# Patient Record
Sex: FEMALE | Race: WHITE | NOT HISPANIC OR LATINO | Employment: OTHER | URBAN - NONMETROPOLITAN AREA
[De-identification: names, ages, dates, MRNs, and addresses within clinical notes are randomized per-mention and may not be internally consistent; named-entity substitution may affect disease eponyms.]

---

## 2023-03-15 DIAGNOSIS — E11.69 DM TYPE 2 WITH DIABETIC MIXED HYPERLIPIDEMIA (MULTI): ICD-10-CM

## 2023-03-15 DIAGNOSIS — E78.2 DM TYPE 2 WITH DIABETIC MIXED HYPERLIPIDEMIA (MULTI): ICD-10-CM

## 2023-03-15 DIAGNOSIS — I10 HTN (HYPERTENSION), BENIGN: Primary | ICD-10-CM

## 2023-03-15 RX ORDER — ATORVASTATIN CALCIUM 40 MG/1
40 TABLET, FILM COATED ORAL DAILY
COMMUNITY
Start: 2018-01-14 | End: 2023-12-18 | Stop reason: SDUPTHER

## 2023-03-15 RX ORDER — ACETAMINOPHEN 500 MG
1 TABLET ORAL DAILY
COMMUNITY

## 2023-03-15 RX ORDER — LANCETS 30 GAUGE
EACH MISCELLANEOUS
COMMUNITY
Start: 2023-02-16 | End: 2024-04-02 | Stop reason: SDUPTHER

## 2023-03-15 RX ORDER — METFORMIN HYDROCHLORIDE 500 MG/1
750 TABLET ORAL 2 TIMES DAILY
COMMUNITY
Start: 2018-03-16 | End: 2023-05-26 | Stop reason: SDUPTHER

## 2023-03-15 RX ORDER — EPINEPHRINE 0.22MG
100 AEROSOL WITH ADAPTER (ML) INHALATION DAILY
COMMUNITY

## 2023-03-15 RX ORDER — PIOGLITAZONE 15 MG/1
15 TABLET ORAL DAILY
Qty: 90 TABLET | Refills: 0 | Status: SHIPPED | OUTPATIENT
Start: 2023-03-15 | End: 2023-03-17 | Stop reason: SDUPTHER

## 2023-03-15 RX ORDER — BLOOD SUGAR DIAGNOSTIC
STRIP MISCELLANEOUS
COMMUNITY
Start: 2020-12-03 | End: 2024-04-08 | Stop reason: SDUPTHER

## 2023-03-15 RX ORDER — ATENOLOL AND CHLORTHALIDONE TABLET 100; 25 MG/1; MG/1
1 TABLET ORAL DAILY
COMMUNITY
Start: 2022-11-17 | End: 2023-05-05 | Stop reason: SDUPTHER

## 2023-03-15 RX ORDER — LANCETS 33 GAUGE
EACH MISCELLANEOUS
COMMUNITY
Start: 2023-02-16

## 2023-03-15 RX ORDER — PIOGLITAZONE 15 MG/1
15 TABLET ORAL DAILY
COMMUNITY
Start: 2006-03-30 | End: 2023-03-15 | Stop reason: SDUPTHER

## 2023-03-17 DIAGNOSIS — E78.2 DM TYPE 2 WITH DIABETIC MIXED HYPERLIPIDEMIA (MULTI): ICD-10-CM

## 2023-03-17 DIAGNOSIS — E11.69 DM TYPE 2 WITH DIABETIC MIXED HYPERLIPIDEMIA (MULTI): ICD-10-CM

## 2023-03-17 RX ORDER — PIOGLITAZONEHYDROCHLORIDE 15 MG/1
15 TABLET ORAL DAILY
Qty: 90 TABLET | Refills: 0 | Status: SHIPPED | OUTPATIENT
Start: 2023-03-17 | End: 2023-12-18 | Stop reason: SDUPTHER

## 2023-03-31 DIAGNOSIS — U07.1 COVID-19: Primary | ICD-10-CM

## 2023-04-11 PROBLEM — M47.812 NECK ARTHRITIS: Status: ACTIVE | Noted: 2023-04-11

## 2023-04-11 PROBLEM — M17.0 OSTEOARTHRITIS OF BOTH KNEES: Status: ACTIVE | Noted: 2023-04-11

## 2023-04-11 PROBLEM — N12 PYELONEPHRITIS OF LEFT KIDNEY: Status: RESOLVED | Noted: 2023-04-11 | Resolved: 2023-04-11

## 2023-04-12 ENCOUNTER — OFFICE VISIT (OUTPATIENT)
Dept: PRIMARY CARE | Facility: CLINIC | Age: 78
End: 2023-04-12
Payer: COMMERCIAL

## 2023-04-12 VITALS
OXYGEN SATURATION: 96 % | BODY MASS INDEX: 20.04 KG/M2 | WEIGHT: 120.4 LBS | TEMPERATURE: 96.5 F | RESPIRATION RATE: 10 BRPM | DIASTOLIC BLOOD PRESSURE: 60 MMHG | HEART RATE: 69 BPM | SYSTOLIC BLOOD PRESSURE: 121 MMHG

## 2023-04-12 DIAGNOSIS — R41.3 MEMORY DEFICIT: Primary | ICD-10-CM

## 2023-04-12 PROCEDURE — 99214 OFFICE O/P EST MOD 30 MIN: CPT | Performed by: FAMILY MEDICINE

## 2023-04-12 PROCEDURE — 1159F MED LIST DOCD IN RCRD: CPT | Performed by: FAMILY MEDICINE

## 2023-04-12 PROCEDURE — 1036F TOBACCO NON-USER: CPT | Performed by: FAMILY MEDICINE

## 2023-04-12 PROCEDURE — 3078F DIAST BP <80 MM HG: CPT | Performed by: FAMILY MEDICINE

## 2023-04-12 PROCEDURE — 3074F SYST BP LT 130 MM HG: CPT | Performed by: FAMILY MEDICINE

## 2023-04-12 RX ORDER — DONEPEZIL HYDROCHLORIDE 5 MG/1
5 TABLET, FILM COATED ORAL DAILY
Qty: 30 TABLET | Refills: 1 | Status: SHIPPED | OUTPATIENT
Start: 2023-04-12 | End: 2023-04-13 | Stop reason: SDUPTHER

## 2023-04-12 ASSESSMENT — ENCOUNTER SYMPTOMS
OCCASIONAL FEELINGS OF UNSTEADINESS: 0
DEPRESSION: 0
LOSS OF SENSATION IN FEET: 0

## 2023-04-12 NOTE — PROGRESS NOTES
"Subjective   Inge Clark is a 78 y.o. female who presents for Dementia (Pts daughter wants her to be evaluated for dementia. ).  HPI    Pt her with spouse, Deacon Clark,  son,  and dtr .     Spouse called the office (phone call documentation in [employer] ) requesting referral for himself and spouse , for a memory evaluation. They live very far away  and so I was unsure who to refer to out in their area.  I sent a msg to our Pt [facility], who helped them.   They got scheduled and paperwork was to be filled out.  Upon seeing the paperwork, pt refused the appointment, refused to fill out the appointment.     Pt's dtr requested this appointment to see what else could be done .     I spoke to the patient and spouse alone as well as with son and dtr present.     Pt states she refused the appointment because it was going to last 3 hrs,  she felt that the paperwork seemed to ask a lot , and she was afraid it would be shared / used for other reason., She states she really didn't see the point as her memory is okay .  She qualifies this saying she is bored a lot, is alone a lot, at home .  She denies any concern .    States she has talked about this ...   she and spouse, she and children , on separate occasions  \" but not like this\"  .   Spouse and children disagree and state they have all had the discussion and they are worried for the patient.    States she feels   - like they're ganging up on me  - treated like she's stupid  - they (the children ) dont live with her, so how would they know.            Objective   Physical Exam  Alert.  Oriented x3 .  Appears depressed .     Assessment/Plan   Problem List Items Addressed This Visit    None  Visit Diagnoses       Memory deficit    -  Primary    Relevant Medications    donepezil (Aricept) 5 mg tablet    Other Relevant Orders    TSH with reflex to Free T4 if abnormal    CBC and Auto Differential    Comprehensive metabolic panel    Vitamin B12    Rheumatoid Factor    " RHODA without Reflex ROSMERY    Uric Acid    Sedimentation Rate          I reviewed the questionaire from  Geriatrician (spouse brought his blank copy)  .  I told the patient this is okay , is standard, saves time to fill it out ahead of time.  There is standard laws about privacy of personal information and this applies to these forms / her answers.  She does not want to have appointment in Geriatrics or fill out the form.       She agrees to a test administered here.      RESULT of S.L.U.M.S. 11   Score is low ,  and suggests memory loss / impairment more than expected for her age. CT Scan of head , done in the last month, shows age related changes.   Medication may slow the progression of memory deficit .   Additional evaluation recommended, as multiple causes for dementia are possible .  Such as mood disorders like ocd, depression, insomnia;  deficiencies like thyroid, b12 .     Pt aware of plan, family aware of plan.     Carlota Mills MD

## 2023-04-13 DIAGNOSIS — R41.3 MEMORY DEFICIT: ICD-10-CM

## 2023-04-13 RX ORDER — DONEPEZIL HYDROCHLORIDE 5 MG/1
5 TABLET, FILM COATED ORAL DAILY
Qty: 90 TABLET | Refills: 1 | Status: SHIPPED | OUTPATIENT
Start: 2023-04-13 | End: 2023-05-26 | Stop reason: SDUPTHER

## 2023-04-14 ENCOUNTER — TELEPHONE (OUTPATIENT)
Dept: PRIMARY CARE | Facility: CLINIC | Age: 78
End: 2023-04-14
Payer: MEDICARE

## 2023-04-14 NOTE — TELEPHONE ENCOUNTER
The patient is calling with a question regarding her memory deficit medication. She states the bottle is telling her to take it at bedtime.  The patient states she was told to specifically told to take it in the morning.    Please verify and call her at number listed

## 2023-05-05 DIAGNOSIS — I10 HTN (HYPERTENSION), BENIGN: ICD-10-CM

## 2023-05-05 RX ORDER — ATENOLOL AND CHLORTHALIDONE TABLET 100; 25 MG/1; MG/1
1 TABLET ORAL DAILY
Qty: 90 TABLET | Refills: 2 | Status: SHIPPED | OUTPATIENT
Start: 2023-05-05 | End: 2023-12-18 | Stop reason: SDUPTHER

## 2023-05-05 NOTE — TELEPHONE ENCOUNTER
Pt called for med refill. Pt needs a refill on her Atenolol. Pt has an apt on 5/11/22 but will run out before.

## 2023-05-08 ENCOUNTER — TELEPHONE (OUTPATIENT)
Dept: PRIMARY CARE | Facility: CLINIC | Age: 78
End: 2023-05-08
Payer: MEDICARE

## 2023-05-08 NOTE — TELEPHONE ENCOUNTER
FYI:    Pt wanted you to be aware the medication you prescribed is working well. She feels good while being on Denepezil. She had to  her May apt to June due to a family issues.

## 2023-05-10 ENCOUNTER — APPOINTMENT (OUTPATIENT)
Dept: PRIMARY CARE | Facility: CLINIC | Age: 78
End: 2023-05-10
Payer: MEDICARE

## 2023-05-11 ENCOUNTER — APPOINTMENT (OUTPATIENT)
Dept: PRIMARY CARE | Facility: CLINIC | Age: 78
End: 2023-05-11
Payer: MEDICARE

## 2023-05-23 ENCOUNTER — TELEPHONE (OUTPATIENT)
Dept: PRIMARY CARE | Facility: CLINIC | Age: 78
End: 2023-05-23
Payer: MEDICARE

## 2023-05-23 DIAGNOSIS — E78.2 DM TYPE 2 WITH DIABETIC MIXED HYPERLIPIDEMIA (MULTI): Primary | ICD-10-CM

## 2023-05-23 DIAGNOSIS — E11.69 DM TYPE 2 WITH DIABETIC MIXED HYPERLIPIDEMIA (MULTI): Primary | ICD-10-CM

## 2023-05-23 DIAGNOSIS — R41.3 MEMORY DEFICIT: ICD-10-CM

## 2023-05-23 NOTE — TELEPHONE ENCOUNTER
"Pt called on the refill line stating that she needs a refill for Metformin. Pt does not currently have an appointment set up anymore due to it being cancelled on 5/9/23, reason for cancelling the appointment says \"pt moved\". Please advise.   "

## 2023-05-23 NOTE — TELEPHONE ENCOUNTER
Called and spoke with patients , Deacon. Deacon states that they were looking into new providers due to living 45 minutes away, but have not looked in to in much due to other health issues. I informed Deacon that in order for Dr. Mills to refill a prescription that the pt (Inge) must have an appointment made with Dr. Mills to fill the med. Deacon states that when Inge returns home that he will have her call the office.

## 2023-05-26 RX ORDER — METFORMIN HYDROCHLORIDE 500 MG/1
750 TABLET ORAL 2 TIMES DAILY
Qty: 90 TABLET | Refills: 1 | Status: SHIPPED | OUTPATIENT
Start: 2023-05-26 | End: 2023-05-30 | Stop reason: SDUPTHER

## 2023-05-26 RX ORDER — DONEPEZIL HYDROCHLORIDE 5 MG/1
5 TABLET, FILM COATED ORAL DAILY
Qty: 90 TABLET | Refills: 1 | Status: SHIPPED | OUTPATIENT
Start: 2023-05-26 | End: 2023-12-18 | Stop reason: SDUPTHER

## 2023-05-26 NOTE — TELEPHONE ENCOUNTER
This must be addressed today please. [place] promised patient we get message to Doctor today. Thank you

## 2023-05-26 NOTE — TELEPHONE ENCOUNTER
Spoke with pt. Miscommunication with patient and  and office. Pt is going to stick with the office. Pt scheduled and office visit for 7/17/23. Pt needs a refill on the following:    Metformin   Donepezil    She would like them sent to [facility] in [place]. She would like 90 day supply for both medications.

## 2023-05-30 DIAGNOSIS — E11.69 DM TYPE 2 WITH DIABETIC MIXED HYPERLIPIDEMIA (MULTI): ICD-10-CM

## 2023-05-30 DIAGNOSIS — E78.2 DM TYPE 2 WITH DIABETIC MIXED HYPERLIPIDEMIA (MULTI): ICD-10-CM

## 2023-05-30 RX ORDER — METFORMIN HYDROCHLORIDE 500 MG/1
750 TABLET ORAL 2 TIMES DAILY
Qty: 270 TABLET | Refills: 1 | Status: SHIPPED | OUTPATIENT
Start: 2023-05-30 | End: 2023-06-27 | Stop reason: SDUPTHER

## 2023-06-27 DIAGNOSIS — E11.69 DM TYPE 2 WITH DIABETIC MIXED HYPERLIPIDEMIA (MULTI): ICD-10-CM

## 2023-06-27 DIAGNOSIS — E78.2 DM TYPE 2 WITH DIABETIC MIXED HYPERLIPIDEMIA (MULTI): ICD-10-CM

## 2023-06-27 RX ORDER — METFORMIN HYDROCHLORIDE 500 MG/1
750 TABLET ORAL 2 TIMES DAILY
Qty: 90 TABLET | Refills: 1 | Status: SHIPPED | OUTPATIENT
Start: 2023-06-27 | End: 2023-09-25 | Stop reason: SDUPTHER

## 2023-06-29 ENCOUNTER — TELEPHONE (OUTPATIENT)
Dept: PRIMARY CARE | Facility: CLINIC | Age: 78
End: 2023-06-29

## 2023-06-29 DIAGNOSIS — E11.69 DM TYPE 2 WITH DIABETIC MIXED HYPERLIPIDEMIA (MULTI): Primary | ICD-10-CM

## 2023-06-29 DIAGNOSIS — M17.0 PRIMARY OSTEOARTHRITIS OF BOTH KNEES: ICD-10-CM

## 2023-06-29 DIAGNOSIS — E78.2 DM TYPE 2 WITH DIABETIC MIXED HYPERLIPIDEMIA (MULTI): Primary | ICD-10-CM

## 2023-06-29 NOTE — TELEPHONE ENCOUNTER
Patient is calling in to request a renwal on handicap placard. She is looking to get two copies; one for her car than one for her husbands. Please advise.

## 2023-06-29 NOTE — TELEPHONE ENCOUNTER
Pt requesting it be sent in the mail due to living to far away to come pick it up. Sent on 6/29/23

## 2023-07-17 ENCOUNTER — OFFICE VISIT (OUTPATIENT)
Dept: PRIMARY CARE | Facility: CLINIC | Age: 78
End: 2023-07-17
Payer: MEDICARE

## 2023-07-17 VITALS
DIASTOLIC BLOOD PRESSURE: 68 MMHG | HEART RATE: 68 BPM | OXYGEN SATURATION: 97 % | HEIGHT: 64 IN | WEIGHT: 122.7 LBS | SYSTOLIC BLOOD PRESSURE: 121 MMHG | TEMPERATURE: 98 F | BODY MASS INDEX: 20.95 KG/M2

## 2023-07-17 DIAGNOSIS — Z12.31 BREAST CANCER SCREENING BY MAMMOGRAM: ICD-10-CM

## 2023-07-17 DIAGNOSIS — E78.2 DM TYPE 2 WITH DIABETIC MIXED HYPERLIPIDEMIA (MULTI): ICD-10-CM

## 2023-07-17 DIAGNOSIS — Z12.11 COLON CANCER SCREENING: ICD-10-CM

## 2023-07-17 DIAGNOSIS — E11.69 DM TYPE 2 WITH DIABETIC MIXED HYPERLIPIDEMIA (MULTI): ICD-10-CM

## 2023-07-17 DIAGNOSIS — Z00.00 MEDICARE ANNUAL WELLNESS VISIT, SUBSEQUENT: Primary | ICD-10-CM

## 2023-07-17 DIAGNOSIS — R41.89 COGNITIVE IMPAIRMENT: ICD-10-CM

## 2023-07-17 DIAGNOSIS — E78.5 HYPERLIPIDEMIA, UNSPECIFIED HYPERLIPIDEMIA TYPE: ICD-10-CM

## 2023-07-17 DIAGNOSIS — I10 HTN (HYPERTENSION), BENIGN: ICD-10-CM

## 2023-07-17 PROCEDURE — 1160F RVW MEDS BY RX/DR IN RCRD: CPT | Performed by: FAMILY MEDICINE

## 2023-07-17 PROCEDURE — G0439 PPPS, SUBSEQ VISIT: HCPCS | Performed by: FAMILY MEDICINE

## 2023-07-17 PROCEDURE — 1036F TOBACCO NON-USER: CPT | Performed by: FAMILY MEDICINE

## 2023-07-17 PROCEDURE — G0009 ADMIN PNEUMOCOCCAL VACCINE: HCPCS | Performed by: FAMILY MEDICINE

## 2023-07-17 PROCEDURE — 1170F FXNL STATUS ASSESSED: CPT | Performed by: FAMILY MEDICINE

## 2023-07-17 PROCEDURE — 1159F MED LIST DOCD IN RCRD: CPT | Performed by: FAMILY MEDICINE

## 2023-07-17 PROCEDURE — 3074F SYST BP LT 130 MM HG: CPT | Performed by: FAMILY MEDICINE

## 2023-07-17 PROCEDURE — 90677 PCV20 VACCINE IM: CPT | Performed by: FAMILY MEDICINE

## 2023-07-17 PROCEDURE — 3078F DIAST BP <80 MM HG: CPT | Performed by: FAMILY MEDICINE

## 2023-07-17 RX ORDER — MULTIVITAMIN
1 TABLET ORAL DAILY
COMMUNITY

## 2023-07-17 ASSESSMENT — PATIENT HEALTH QUESTIONNAIRE - PHQ9
2. FEELING DOWN, DEPRESSED OR HOPELESS: NOT AT ALL
1. LITTLE INTEREST OR PLEASURE IN DOING THINGS: NOT AT ALL
SUM OF ALL RESPONSES TO PHQ9 QUESTIONS 1 AND 2: 0

## 2023-07-17 ASSESSMENT — ENCOUNTER SYMPTOMS
DEPRESSION: 0
LOSS OF SENSATION IN FEET: 0
OCCASIONAL FEELINGS OF UNSTEADINESS: 0

## 2023-07-17 ASSESSMENT — ACTIVITIES OF DAILY LIVING (ADL)
GROCERY_SHOPPING: INDEPENDENT
DOING_HOUSEWORK: INDEPENDENT
MANAGING_FINANCES: INDEPENDENT
BATHING: INDEPENDENT
TAKING_MEDICATION: INDEPENDENT

## 2023-07-17 NOTE — PROGRESS NOTES
"Subjective   Reason for Visit: Inge Clark is an 78 y.o. female here for a Medicare Wellness visit.          Reviewed all medications by prescribing practitioner or clinical pharmacist (such as prescriptions, OTCs, herbal therapies and supplements) and documented in the medical record.    HPI      Here for AWV .  Last visit was approx 3 months ago,  concern for memory.  Declined Geriatric C/S , and we started Aricept  .   Due to distance , she and spouse were planning to transfer PCP office.  Then refills for meds came due and they came back for their AWV appts today.      Pt reports the medication is  \"great\"  . States she is doing more,  talking better(?)  , has gone out w friends recently.     Patient Care Team:  Carlota Mills MD as PCP - General (Family Medicine)  Carlota Mills MD as PCP - [employer] PCP     Review of Systems    No headache, constipation,  gi sxs.    No nausea .       Objective   Vitals:  /68 (BP Location: Right arm, Patient Position: Sitting, BP Cuff Size: Adult)   Pulse 68   Temp 36.7 °C (98 °F) (Temporal)   Ht 1.626 m (5' 4\")   Wt 55.7 kg (122 lb 11.2 oz)   SpO2 97%   BMI 21.06 kg/m²       Physical Exam   Alert.  Oriented.  Less anxious / irritable than last visit.       Assessment/Plan   Problem List Items Addressed This Visit          Medium    Cognitive impairment    DM type 2 with diabetic mixed hyperlipidemia (CMS/HCC)    HLD (hyperlipidemia)    HTN (hypertension), benign     Other Visit Diagnoses       Medicare annual wellness visit, subsequent    -  Primary    Breast cancer screening by mammogram        Relevant Orders    BI mammo bilateral screening tomosynthesis    Colon cancer screening        Relevant Orders    Referral to Gastroenterology          They are planning to see a provider closer to home. Not yet scheduled , but plan to soon.  Advised they should plan for 6 month appt.,   Pt is due for a pneumonia vaccine, will update that today.   Reminded " her about CRC and Br Ca Screening , and labwork .  Orders placed. They state the new doctor , will be in [place] as well .    Thanked them for travelling to see me, and offered help in the interim if something comes up before they get established.   She denies need for refills at this time.

## 2023-08-01 DIAGNOSIS — E11.69 DM TYPE 2 WITH DIABETIC MIXED HYPERLIPIDEMIA (MULTI): ICD-10-CM

## 2023-08-01 DIAGNOSIS — E78.2 DM TYPE 2 WITH DIABETIC MIXED HYPERLIPIDEMIA (MULTI): ICD-10-CM

## 2023-08-02 RX ORDER — METFORMIN HYDROCHLORIDE 500 MG/1
750 TABLET ORAL 2 TIMES DAILY
Qty: 270 TABLET | Refills: 0 | OUTPATIENT
Start: 2023-08-02 | End: 2023-10-31

## 2023-09-21 ENCOUNTER — TELEPHONE (OUTPATIENT)
Dept: PRIMARY CARE | Facility: CLINIC | Age: 78
End: 2023-09-21
Payer: MEDICARE

## 2023-09-21 NOTE — TELEPHONE ENCOUNTER
Yes, with an appointment.  I saw her in July .  She is due for a visit in January 2024. Schedule a visit .

## 2023-09-21 NOTE — TELEPHONE ENCOUNTER
Patient called to let you know she was unable to get in with new PCP in [place] until March      Are you willing to send in refills if she is to need any prior to then?

## 2023-09-25 DIAGNOSIS — E11.69 DM TYPE 2 WITH DIABETIC MIXED HYPERLIPIDEMIA (MULTI): ICD-10-CM

## 2023-09-25 DIAGNOSIS — E78.2 DM TYPE 2 WITH DIABETIC MIXED HYPERLIPIDEMIA (MULTI): ICD-10-CM

## 2023-09-26 RX ORDER — METFORMIN HYDROCHLORIDE 500 MG/1
750 TABLET ORAL 2 TIMES DAILY
Qty: 90 TABLET | Refills: 3 | Status: SHIPPED | OUTPATIENT
Start: 2023-09-26 | End: 2023-12-18 | Stop reason: SDUPTHER

## 2023-10-16 ENCOUNTER — APPOINTMENT (OUTPATIENT)
Dept: PRIMARY CARE | Facility: CLINIC | Age: 78
End: 2023-10-16

## 2023-10-30 ENCOUNTER — HOSPITAL ENCOUNTER (EMERGENCY)
Facility: HOSPITAL | Age: 78
Discharge: HOME | End: 2023-10-30
Payer: MEDICARE

## 2023-10-30 VITALS
WEIGHT: 115 LBS | DIASTOLIC BLOOD PRESSURE: 78 MMHG | RESPIRATION RATE: 16 BRPM | HEART RATE: 66 BPM | HEIGHT: 64 IN | OXYGEN SATURATION: 98 % | TEMPERATURE: 97.7 F | BODY MASS INDEX: 19.63 KG/M2 | SYSTOLIC BLOOD PRESSURE: 128 MMHG

## 2023-10-30 DIAGNOSIS — B02.9 HERPES ZOSTER WITHOUT COMPLICATION: Primary | ICD-10-CM

## 2023-10-30 PROCEDURE — 99283 EMERGENCY DEPT VISIT LOW MDM: CPT

## 2023-10-30 RX ORDER — VALACYCLOVIR HYDROCHLORIDE 1 G/1
1000 TABLET, FILM COATED ORAL 3 TIMES DAILY
Qty: 30 TABLET | Refills: 0 | Status: SHIPPED | OUTPATIENT
Start: 2023-10-30 | End: 2023-11-09

## 2023-10-30 ASSESSMENT — PAIN - FUNCTIONAL ASSESSMENT: PAIN_FUNCTIONAL_ASSESSMENT: 0-10

## 2023-10-30 ASSESSMENT — LIFESTYLE VARIABLES
HAVE PEOPLE ANNOYED YOU BY CRITICIZING YOUR DRINKING: NO
EVER FELT BAD OR GUILTY ABOUT YOUR DRINKING: NO
EVER HAD A DRINK FIRST THING IN THE MORNING TO STEADY YOUR NERVES TO GET RID OF A HANGOVER: NO
REASON UNABLE TO ASSESS: NO
HAVE YOU EVER FELT YOU SHOULD CUT DOWN ON YOUR DRINKING: NO

## 2023-10-30 ASSESSMENT — PAIN SCALES - GENERAL
PAINLEVEL_OUTOF10: 3
PAINLEVEL_OUTOF10: 3

## 2023-10-30 ASSESSMENT — PAIN DESCRIPTION - PAIN TYPE
TYPE: ACUTE PAIN
TYPE: ACUTE PAIN

## 2023-10-30 ASSESSMENT — COLUMBIA-SUICIDE SEVERITY RATING SCALE - C-SSRS
2. HAVE YOU ACTUALLY HAD ANY THOUGHTS OF KILLING YOURSELF?: NO
6. HAVE YOU EVER DONE ANYTHING, STARTED TO DO ANYTHING, OR PREPARED TO DO ANYTHING TO END YOUR LIFE?: NO
1. IN THE PAST MONTH, HAVE YOU WISHED YOU WERE DEAD OR WISHED YOU COULD GO TO SLEEP AND NOT WAKE UP?: NO

## 2023-10-30 ASSESSMENT — PAIN DESCRIPTION - DESCRIPTORS: DESCRIPTORS: ACHING

## 2023-10-30 ASSESSMENT — PAIN DESCRIPTION - LOCATION
LOCATION: ABDOMEN
LOCATION: ABDOMEN

## 2023-10-30 ASSESSMENT — PAIN DESCRIPTION - ORIENTATION: ORIENTATION: LEFT

## 2023-10-30 NOTE — ED PROVIDER NOTES
Emergency Department Encounter  [facility] EMERGENCY MEDICINE    Patient: Inge Clark  MRN: [id number]  : 1945  Date of Evaluation: 10/30/2023  ED Provider: NISHI King      Chief Complaint       Chief Complaint   Patient presents with    Rash     Cabazon    (Location/Symptom, Timing/Onset, Context/Setting, Quality, Duration, Modifying Factors, Severity) Note limiting factors.   Limitations to History: none  Historian: self  Records reviewed: EMR inpatient and outpatient notes, Care Everywhere      Inge Clark is a 78 y.o. female who presents to the emergency department complaining of shingles outbreak to the right side of the back.  Started 2 days ago, was diagnosed with shingles on the left side on  and rash has resolved.  2 days ago patient reports that she noticed the same type of rash on the right side of the back with the lesion to the middle of the back and extending to the right side, denies any fevers, chills, does report that she did receive her vaccinations recently and felt like the flareup may have started after that.    ROS:     Review of Systems  14 systems reviewed and otherwise acutely negative except as in the Cabazon.          Past History     Past Medical History:   Diagnosis Date    Essential (primary) hypertension 2022    HTN (hypertension), benign    Insect bite (nonvenomous) of scalp, initial encounter (CODE) 2017    Tick bite of scalp, initial encounter    Listeriosis, unspecified 2016    Listeria infection    Other conditions influencing health status 2018    Normal colonoscopy    Pyelonephritis of left kidney 2023    Type 2 diabetes mellitus with other specified complication (CMS/[facility]) 2022    DM type 2 with diabetic mixed hyperlipidemia    Unilateral primary osteoarthritis, right hip 2019    Primary osteoarthritis of right hip    Vitamin D deficiency, unspecified     Vitamin D deficiency     Past  Surgical History:   Procedure Laterality Date    OTHER SURGICAL HISTORY  09/30/2019    Tonsillectomy with adenoidectomy     Social History     Socioeconomic History    Marital status:      Spouse name: None    Number of children: None    Years of education: None    Highest education level: None   Occupational History    None   Tobacco Use    Smoking status: Never    Smokeless tobacco: Never   Substance and Sexual Activity    Alcohol use: Yes     Comment: Socially    Drug use: Never    Sexual activity: None   Other Topics Concern    None   Social History Narrative    None     Social Determinants of Health     Financial Resource Strain: Not on file   Food Insecurity: Not on file   Transportation Needs: Not on file   Physical Activity: Not on file   Stress: Not on file   Social Connections: Not on file   Intimate Partner Violence: Not on file   Housing Stability: Not on file       Medications/Allergies     Previous Medications    ATENOLOL-CHLORTHALIDONE (TENORETIC) 100-25 MG TABLET    Take 1 tablet by mouth once daily.    ATORVASTATIN (LIPITOR) 40 MG TABLET    Take 1 tablet (40 mg) by mouth once daily.    BABY ASPIRIN ORAL    Take 1 tablet by mouth once daily.    CHOLECALCIFEROL (VITAMIN D-3) 50 MCG (2,000 UNIT) CAPSULE    Take by mouth.    COENZYME Q-10 100 MG CAPSULE    Take 1 capsule (100 mg) by mouth once daily.    DONEPEZIL (ARICEPT) 5 MG TABLET    Take 1 tablet (5 mg) by mouth once daily.    METFORMIN (GLUCOPHAGE) 500 MG TABLET    Take 1.5 tablets (750 mg) by mouth 2 times a day.    MULTIVITAMIN TABLET    Take 1 tablet by mouth once daily.    ONETOUCH DELICA PLUS LANCET 30 GAUGE MISC        ONETOUCH ULTRA TEST STRIP    TEST ONCE DAILY    ONETOUCH ULTRA2 METER MISC        PIOGLITAZONE (ACTOS) 15 MG TABLET    Take 1 tablet (15 mg) by mouth once daily.     No Known Allergies     Physical Exam       ED Triage Vitals [10/30/23 1622]   Temp Heart Rate Resp BP   36.2 °C (97.2 °F) 67 18 138/88      SpO2 Temp  "Source Heart Rate Source Patient Position   97 % Tympanic -- --      BP Location FiO2 (%)     -- --         Physical Exam    GENERAL:  The patient appears nourished and normally developed. Vital signs as documented.     HEENT:  Head normocephalic, atraumatic, EOMs intact, PERRLA, Mucous membranes moist. Nares patent without copious rhinorrhea.  No lymphadenopathy.    PULMONARY:  Lungs are clear to auscultation, without any respiratory distress. Able to speak full sentences, no accessory muscle use    CARDIAC:   Normal rate. No murmurs, rubs or gallops    ABDOMEN:  Soft, non distended, non tender, BS positive x 4 quadrants, No rebound or guarding, no peritoneal signs, no CVA tenderness, no masses or organomegaly    : External exam unremarkable, speculum exam with no discharge, no bleeding, no adnexal tenderness or masses    MUSCULOSKELETAL:   Able to ambulate, Non edematous, with no obvious deformities. Pulses intact distal    SKIN:   2 vesicular lesions noted to right middle back and along a dermatome lateral with 2 more vesicular lesions, no streaking, no purulent drainage    NEURO:  No obvious neurological deficits, normal sensation and strength bilaterally.  Able to follow commands, NIH 0, CN 2-12 intact.        Diagnostics   Labs:  Labs Reviewed - No data to display  Radiographs:  No orders to display         Assessment   In brief, Inge Clark is a 78 y.o. female who presented to the emergency department for recurrent shingles on the opposite side    Plan   Valacyclovir    Differentials   Shingles  Herpes simplex  Contact dermatitis    ED Course     Diagnoses as of 10/30/23 1631   Herpes zoster without complication       Visit Vitals  /88   Pulse 67   Temp 36.2 °C (97.2 °F) (Tympanic)   Resp 18   Ht 1.626 m (5' 4\")   Wt 52.2 kg (115 lb)   SpO2 97%   BMI 19.74 kg/m²   OB Status Postmenopausal   Smoking Status Never   BSA 1.54 m²       Medications - No data to display    Plan of care discussed, " patient is well-appearing, in no distress, states the discomfort is the same as when she had shingles on the other side, did well on the valacyclovir, prescription sent to patient's pharmacy, will be discharged home in stable condition, educated on any worsening signs and symptoms to return to the emergency department      Final Impression      1. Herpes zoster without complication          DISPOSITION  Disposition: Discharge  Patient condition is: Stable    Comment: Please note this report has been produced using speech recognition software and may contain errors related to that system including errors in grammar, punctuation, and spelling, as well as words and phrases that may be inappropriate.  If there are any questions or concerns please feel free to contact the dictating provider for clarification.    NISHI King APRN-CNP  10/30/23 6810

## 2023-11-21 ENCOUNTER — APPOINTMENT (OUTPATIENT)
Dept: GERIATRIC MEDICINE | Facility: CLINIC | Age: 78
End: 2023-11-21
Payer: COMMERCIAL

## 2023-11-21 ENCOUNTER — APPOINTMENT (OUTPATIENT)
Dept: NEUROLOGY | Facility: CLINIC | Age: 78
End: 2023-11-21
Payer: COMMERCIAL

## 2023-11-30 ENCOUNTER — HOSPITAL ENCOUNTER (EMERGENCY)
Facility: HOSPITAL | Age: 78
Discharge: HOME | End: 2023-11-30
Payer: MEDICARE

## 2023-11-30 VITALS
RESPIRATION RATE: 18 BRPM | OXYGEN SATURATION: 97 % | HEIGHT: 64 IN | WEIGHT: 122 LBS | DIASTOLIC BLOOD PRESSURE: 102 MMHG | HEART RATE: 76 BPM | SYSTOLIC BLOOD PRESSURE: 134 MMHG | TEMPERATURE: 97.3 F | BODY MASS INDEX: 20.83 KG/M2

## 2023-11-30 DIAGNOSIS — B02.9 HERPES ZOSTER WITHOUT COMPLICATION: Primary | ICD-10-CM

## 2023-11-30 PROCEDURE — 99283 EMERGENCY DEPT VISIT LOW MDM: CPT

## 2023-11-30 PROCEDURE — 99283 EMERGENCY DEPT VISIT LOW MDM: CPT | Performed by: PHYSICIAN ASSISTANT

## 2023-11-30 RX ORDER — VALACYCLOVIR HYDROCHLORIDE 1 G/1
1000 TABLET, FILM COATED ORAL 3 TIMES DAILY
Qty: 30 TABLET | Refills: 0 | Status: SHIPPED | OUTPATIENT
Start: 2023-11-30 | End: 2023-11-30

## 2023-11-30 RX ORDER — VALACYCLOVIR HYDROCHLORIDE 1 G/1
1000 TABLET, FILM COATED ORAL 3 TIMES DAILY
Qty: 30 TABLET | Refills: 0 | Status: SHIPPED | OUTPATIENT
Start: 2023-11-30 | End: 2023-11-30 | Stop reason: SDUPTHER

## 2023-11-30 RX ORDER — VALACYCLOVIR HYDROCHLORIDE 1 G/1
1000 TABLET, FILM COATED ORAL 3 TIMES DAILY
Qty: 30 TABLET | Refills: 0 | Status: SHIPPED | OUTPATIENT
Start: 2023-11-30 | End: 2023-12-10

## 2023-11-30 ASSESSMENT — PAIN - FUNCTIONAL ASSESSMENT: PAIN_FUNCTIONAL_ASSESSMENT: 0-10

## 2023-11-30 ASSESSMENT — PAIN SCALES - GENERAL: PAINLEVEL_OUTOF10: 9

## 2023-11-30 ASSESSMENT — LIFESTYLE VARIABLES
HAVE YOU EVER FELT YOU SHOULD CUT DOWN ON YOUR DRINKING: NO
HAVE PEOPLE ANNOYED YOU BY CRITICIZING YOUR DRINKING: NO
EVER HAD A DRINK FIRST THING IN THE MORNING TO STEADY YOUR NERVES TO GET RID OF A HANGOVER: NO
REASON UNABLE TO ASSESS: NO
EVER FELT BAD OR GUILTY ABOUT YOUR DRINKING: NO

## 2023-11-30 NOTE — ED PROVIDER NOTES
HPI   Chief Complaint   Patient presents with    Rash     Pt presents to the ER with shingles to her back. Pt has had this happen x3 other times. Pt states that she's uncomfortable but not in immense pain       This is a 78-year-old female with PMH T2DM, HTN presenting for evaluation of shingles.  States she has had shingles twice since October most recently finished about 2 weeks ago.  States she began having painful rash in the left scapular region which is where she has a rash these past 2 times and it feels the exact same.  Denies any systemic symptoms or symptoms anywhere else.                          No data recorded                Patient History   Past Medical History:   Diagnosis Date    Essential (primary) hypertension 12/29/2022    HTN (hypertension), benign    Insect bite (nonvenomous) of scalp, initial encounter (CODE) 06/05/2017    Tick bite of scalp, initial encounter    Listeriosis, unspecified 05/23/2016    Listeria infection    Other conditions influencing health status 01/29/2018    Normal colonoscopy    Pyelonephritis of left kidney 04/11/2023    Type 2 diabetes mellitus with other specified complication (CMS/[facility]) 12/29/2022    DM type 2 with diabetic mixed hyperlipidemia    Unilateral primary osteoarthritis, right hip 12/09/2019    Primary osteoarthritis of right hip    Vitamin D deficiency, unspecified     Vitamin D deficiency     Past Surgical History:   Procedure Laterality Date    OTHER SURGICAL HISTORY  09/30/2019    Tonsillectomy with adenoidectomy     Family History   Problem Relation Name Age of Onset    Stroke Mother      Heart attack Mother      Hypertension Father       Social History     Tobacco Use    Smoking status: Never    Smokeless tobacco: Never   Substance Use Topics    Alcohol use: Yes     Comment: Socially    Drug use: Never       Physical Exam   ED Triage Vitals [11/30/23 1415]   Temp Heart Rate Resp BP   36.3 °C (97.3 °F) 79 19 131/64      SpO2 Temp Source Heart Rate  Source Patient Position   96 % Tympanic Monitor Sitting      BP Location FiO2 (%)     Left arm --       Physical Exam  Constitutional:       Appearance: Normal appearance.   Cardiovascular:      Rate and Rhythm: Normal rate and regular rhythm.      Pulses: Normal pulses.      Heart sounds: Normal heart sounds.   Pulmonary:      Effort: Pulmonary effort is normal.      Breath sounds: Normal breath sounds.   Skin:     Comments: Erythematous blanchable macular vesicular rash approximately 3 lesions in the left scapular area.  Appears dermatomal.   Neurological:      Mental Status: She is alert.         ED Course & MDM   Diagnoses as of 11/30/23 1709   Herpes zoster without complication       Medical Decision Making  There does not appear to be any evidence of ocular involvement or dissemination.  Patient is stable hemodynamically.  Will prescribe Valtrex.  Given referrals for dermatology and PCP given that the patient needs a new PCP.  Instructed to return to the nearest ED if any concerns or new or worsening symptoms. Patient verbalized understanding and agreement with plan. Discharged in stable condition.      Disclaimer: This note was dictated using speech recognition software. An attempt at proofreading was made to minimize errors. Minor errors in transcription may be present. Please call if questions.        Procedure  Procedures     Alvarado Ortiz PA-C  11/30/23 1711

## 2023-12-18 ENCOUNTER — TELEPHONE (OUTPATIENT)
Dept: PRIMARY CARE | Facility: CLINIC | Age: 78
End: 2023-12-18

## 2023-12-18 DIAGNOSIS — E11.69 DM TYPE 2 WITH DIABETIC MIXED HYPERLIPIDEMIA (MULTI): ICD-10-CM

## 2023-12-18 DIAGNOSIS — R41.3 MEMORY DEFICIT: ICD-10-CM

## 2023-12-18 DIAGNOSIS — I10 HTN (HYPERTENSION), BENIGN: ICD-10-CM

## 2023-12-18 DIAGNOSIS — E78.2 DM TYPE 2 WITH DIABETIC MIXED HYPERLIPIDEMIA (MULTI): ICD-10-CM

## 2023-12-18 RX ORDER — METFORMIN HYDROCHLORIDE 500 MG/1
750 TABLET ORAL 2 TIMES DAILY
Qty: 90 TABLET | Refills: 0 | Status: SHIPPED | OUTPATIENT
Start: 2023-12-18 | End: 2024-02-01 | Stop reason: SDUPTHER

## 2023-12-18 RX ORDER — ATENOLOL AND CHLORTHALIDONE TABLET 100; 25 MG/1; MG/1
1 TABLET ORAL DAILY
Qty: 90 TABLET | Refills: 0 | Status: SHIPPED | OUTPATIENT
Start: 2023-12-18 | End: 2024-02-01 | Stop reason: SDUPTHER

## 2023-12-18 RX ORDER — ATORVASTATIN CALCIUM 40 MG/1
40 TABLET, FILM COATED ORAL DAILY
Qty: 90 TABLET | Refills: 0 | Status: SHIPPED | OUTPATIENT
Start: 2023-12-18 | End: 2024-02-01 | Stop reason: SDUPTHER

## 2023-12-18 RX ORDER — DONEPEZIL HYDROCHLORIDE 5 MG/1
5 TABLET, FILM COATED ORAL DAILY
Qty: 90 TABLET | Refills: 0 | Status: SHIPPED | OUTPATIENT
Start: 2023-12-18 | End: 2024-02-01 | Stop reason: SDUPTHER

## 2023-12-18 RX ORDER — PIOGLITAZONEHYDROCHLORIDE 15 MG/1
15 TABLET ORAL DAILY
Qty: 90 TABLET | Refills: 0 | Status: SHIPPED | OUTPATIENT
Start: 2023-12-18 | End: 2024-02-01 | Stop reason: SDUPTHER

## 2023-12-18 NOTE — TELEPHONE ENCOUNTER
Patient states her  ran over a bag with all of her medications in it and she was only able to recover 5 pills, she will be out of all of these:    Atorvastatin  Atenolol-chlorthalidone  Pioglitazone   Donepezil   Metformin     She is requesting these be resent to pharmacy, aware insurance may not cover early     [facility]

## 2024-01-19 ENCOUNTER — APPOINTMENT (OUTPATIENT)
Dept: PRIMARY CARE | Facility: CLINIC | Age: 79
End: 2024-01-19
Payer: MEDICARE

## 2024-02-01 ENCOUNTER — OFFICE VISIT (OUTPATIENT)
Dept: PRIMARY CARE | Facility: CLINIC | Age: 79
End: 2024-02-01
Payer: MEDICARE

## 2024-02-01 VITALS
HEART RATE: 68 BPM | WEIGHT: 124.5 LBS | OXYGEN SATURATION: 95 % | DIASTOLIC BLOOD PRESSURE: 51 MMHG | BODY MASS INDEX: 21.37 KG/M2 | SYSTOLIC BLOOD PRESSURE: 118 MMHG | TEMPERATURE: 97.3 F

## 2024-02-01 DIAGNOSIS — E11.69 DM TYPE 2 WITH DIABETIC MIXED HYPERLIPIDEMIA (MULTI): ICD-10-CM

## 2024-02-01 DIAGNOSIS — I10 HTN (HYPERTENSION), BENIGN: ICD-10-CM

## 2024-02-01 DIAGNOSIS — R41.3 MEMORY DEFICIT: ICD-10-CM

## 2024-02-01 DIAGNOSIS — E78.2 DM TYPE 2 WITH DIABETIC MIXED HYPERLIPIDEMIA (MULTI): ICD-10-CM

## 2024-02-01 PROCEDURE — 3074F SYST BP LT 130 MM HG: CPT | Performed by: FAMILY MEDICINE

## 2024-02-01 PROCEDURE — 1159F MED LIST DOCD IN RCRD: CPT | Performed by: FAMILY MEDICINE

## 2024-02-01 PROCEDURE — 99213 OFFICE O/P EST LOW 20 MIN: CPT | Performed by: FAMILY MEDICINE

## 2024-02-01 PROCEDURE — 3078F DIAST BP <80 MM HG: CPT | Performed by: FAMILY MEDICINE

## 2024-02-01 PROCEDURE — 1036F TOBACCO NON-USER: CPT | Performed by: FAMILY MEDICINE

## 2024-02-01 PROCEDURE — 1125F AMNT PAIN NOTED PAIN PRSNT: CPT | Performed by: FAMILY MEDICINE

## 2024-02-01 RX ORDER — METFORMIN HYDROCHLORIDE 500 MG/1
750 TABLET ORAL 2 TIMES DAILY
Qty: 270 TABLET | Refills: 0 | Status: SHIPPED | OUTPATIENT
Start: 2024-02-01

## 2024-02-01 RX ORDER — PIOGLITAZONEHYDROCHLORIDE 15 MG/1
15 TABLET ORAL DAILY
Qty: 90 TABLET | Refills: 0 | Status: SHIPPED | OUTPATIENT
Start: 2024-02-01 | End: 2024-05-08 | Stop reason: SDUPTHER

## 2024-02-01 RX ORDER — ATENOLOL AND CHLORTHALIDONE TABLET 100; 25 MG/1; MG/1
1 TABLET ORAL DAILY
Qty: 90 TABLET | Refills: 0 | Status: SHIPPED | OUTPATIENT
Start: 2024-02-01 | End: 2024-05-08 | Stop reason: SDUPTHER

## 2024-02-01 RX ORDER — ATORVASTATIN CALCIUM 40 MG/1
40 TABLET, FILM COATED ORAL DAILY
Qty: 90 TABLET | Refills: 0 | Status: SHIPPED | OUTPATIENT
Start: 2024-02-01 | End: 2024-05-01

## 2024-02-01 RX ORDER — DONEPEZIL HYDROCHLORIDE 5 MG/1
5 TABLET, FILM COATED ORAL DAILY
Qty: 90 TABLET | Refills: 0 | Status: SHIPPED | OUTPATIENT
Start: 2024-02-01 | End: 2024-03-29 | Stop reason: DRUGHIGH

## 2024-02-01 NOTE — PROGRESS NOTES
Subjective   Patient ID: Inge Clark is a 78 y.o. female who presents for Follow-up.  HPI  Here w spouse.    Pt is in process of changing PCP office, to a place closer to her home.   However, she ran out of medication and I asked her to come in for an appointment.     Relates a recent  visit for a rash , and misdiagnosis of shingles.  States it was a terrible experience.   Other than this, her interval health is good       Review of Systems  Denies CP,  palpitations, sob .         Objective   /51 (BP Location: Left arm, Patient Position: Sitting, BP Cuff Size: Adult)   Pulse 68   Temp 36.3 °C (97.3 °F) (Temporal)   Wt 56.5 kg (124 lb 8 oz)   SpO2 95%   BMI 21.37 kg/m²     Physical Exam  Vitals reviewed.   Constitutional:       General: She is not in acute distress.  Cardiovascular:      Rate and Rhythm: Normal rate and regular rhythm.      Heart sounds: Normal heart sounds.   Pulmonary:      Effort: Pulmonary effort is normal.      Breath sounds: No wheezing or rales.   Neurological:      General: No focal deficit present.      Mental Status: She is alert.     CV regular, with occasional extra beat     Assessment/Plan   Problem List Items Addressed This Visit          Medium    DM type 2 with diabetic mixed hyperlipidemia (CMS/HCC)    Relevant Medications    metFORMIN (Glucophage) 500 mg tablet    pioglitazone (Actos) 15 mg tablet    atorvastatin (Lipitor) 40 mg tablet    HTN (hypertension), benign    Relevant Medications    atenoloL-chlorthalidone (Tenoretic) 100-25 mg tablet     Other Visit Diagnoses       Memory deficit        Relevant Medications    donepezil (Aricept) 5 mg tablet          Gave refills till she gets in with new PCP       Declinesto do labs.  Wishes to meet w new PCP and have her determine labs .   ELIN Mills MD

## 2024-03-14 ENCOUNTER — OFFICE VISIT (OUTPATIENT)
Dept: PRIMARY CARE | Facility: CLINIC | Age: 79
End: 2024-03-14
Payer: MEDICARE

## 2024-03-14 VITALS
WEIGHT: 132 LBS | DIASTOLIC BLOOD PRESSURE: 78 MMHG | BODY MASS INDEX: 22.53 KG/M2 | SYSTOLIC BLOOD PRESSURE: 131 MMHG | HEART RATE: 87 BPM | HEIGHT: 64 IN

## 2024-03-14 DIAGNOSIS — E11.69 DM TYPE 2 WITH DIABETIC MIXED HYPERLIPIDEMIA (MULTI): ICD-10-CM

## 2024-03-14 DIAGNOSIS — E78.5 HYPERLIPIDEMIA, UNSPECIFIED HYPERLIPIDEMIA TYPE: ICD-10-CM

## 2024-03-14 DIAGNOSIS — E55.9 VITAMIN D DEFICIENCY: Primary | ICD-10-CM

## 2024-03-14 DIAGNOSIS — I10 HTN (HYPERTENSION), BENIGN: ICD-10-CM

## 2024-03-14 DIAGNOSIS — E78.2 DM TYPE 2 WITH DIABETIC MIXED HYPERLIPIDEMIA (MULTI): ICD-10-CM

## 2024-03-14 DIAGNOSIS — Z76.89 ENCOUNTER TO ESTABLISH CARE: ICD-10-CM

## 2024-03-14 DIAGNOSIS — R41.89 COGNITIVE IMPAIRMENT: ICD-10-CM

## 2024-03-14 PROCEDURE — 99214 OFFICE O/P EST MOD 30 MIN: CPT | Performed by: CLINICAL NURSE SPECIALIST

## 2024-03-14 PROCEDURE — 3075F SYST BP GE 130 - 139MM HG: CPT | Performed by: CLINICAL NURSE SPECIALIST

## 2024-03-14 PROCEDURE — 1160F RVW MEDS BY RX/DR IN RCRD: CPT | Performed by: CLINICAL NURSE SPECIALIST

## 2024-03-14 PROCEDURE — 1036F TOBACCO NON-USER: CPT | Performed by: CLINICAL NURSE SPECIALIST

## 2024-03-14 PROCEDURE — 3078F DIAST BP <80 MM HG: CPT | Performed by: CLINICAL NURSE SPECIALIST

## 2024-03-14 PROCEDURE — 1159F MED LIST DOCD IN RCRD: CPT | Performed by: CLINICAL NURSE SPECIALIST

## 2024-03-14 ASSESSMENT — ENCOUNTER SYMPTOMS
LOSS OF SENSATION IN FEET: 0
MYALGIAS: 0
UNEXPECTED WEIGHT CHANGE: 0
FATIGUE: 0
BRUISES/BLEEDS EASILY: 0
DIZZINESS: 0
CONSTIPATION: 0
SEIZURES: 0
VOMITING: 0
WHEEZING: 0
PHOTOPHOBIA: 0
HEMATURIA: 0
TROUBLE SWALLOWING: 0
FEVER: 0
CHEST TIGHTNESS: 0
CHILLS: 0
SLEEP DISTURBANCE: 0
FLANK PAIN: 0
POLYDIPSIA: 0
DYSURIA: 0
EYE PAIN: 0
ARTHRALGIAS: 0
WOUND: 0
APPETITE CHANGE: 0
DIARRHEA: 0
BLOOD IN STOOL: 0
CONFUSION: 1
NAUSEA: 0
BACK PAIN: 0
OCCASIONAL FEELINGS OF UNSTEADINESS: 0
COUGH: 0
NECK PAIN: 0
SHORTNESS OF BREATH: 0
DEPRESSION: 0
ABDOMINAL PAIN: 0
ACTIVITY CHANGE: 0
SORE THROAT: 0
HEADACHES: 0
PALPITATIONS: 0
JOINT SWELLING: 0

## 2024-03-14 ASSESSMENT — ANXIETY QUESTIONNAIRES
3. WORRYING TOO MUCH ABOUT DIFFERENT THINGS: NOT AT ALL
5. BEING SO RESTLESS THAT IT IS HARD TO SIT STILL: NOT AT ALL
6. BECOMING EASILY ANNOYED OR IRRITABLE: NOT AT ALL
GAD7 TOTAL SCORE: 0
4. TROUBLE RELAXING: NOT AT ALL
7. FEELING AFRAID AS IF SOMETHING AWFUL MIGHT HAPPEN: NOT AT ALL
IF YOU CHECKED OFF ANY PROBLEMS ON THIS QUESTIONNAIRE, HOW DIFFICULT HAVE THESE PROBLEMS MADE IT FOR YOU TO DO YOUR WORK, TAKE CARE OF THINGS AT HOME, OR GET ALONG WITH OTHER PEOPLE: NOT DIFFICULT AT ALL
2. NOT BEING ABLE TO STOP OR CONTROL WORRYING: NOT AT ALL
1. FEELING NERVOUS, ANXIOUS, OR ON EDGE: NOT AT ALL

## 2024-03-14 ASSESSMENT — PATIENT HEALTH QUESTIONNAIRE - PHQ9
SUM OF ALL RESPONSES TO PHQ9 QUESTIONS 1 AND 2: 0
2. FEELING DOWN, DEPRESSED OR HOPELESS: NOT AT ALL
1. LITTLE INTEREST OR PLEASURE IN DOING THINGS: NOT AT ALL

## 2024-03-14 NOTE — PROGRESS NOTES
"Subjective   Patient ID: Inge Clark is a 79 y.o. female who presents for Establish Care (New pt here to est care).  HPI    New patient here today to establish care.  Transfer care from PCP in [place] due to location. Here today with her .     Patient states that her overall health is \"good.\" Ambulating independently without assistive device. Lives with her  at home. States that she takes her medications as prescribed. Denies any recent illnesses. Denies any complaints of pain. Denies any chest pain or shortness of breath. Adult children live close by and check in with them.     Due for updated lab work. States that she did not have done previously due to changing PCP.     Review of Systems   Constitutional:  Negative for activity change, appetite change, chills, fatigue, fever and unexpected weight change.   HENT:  Negative for ear pain, hearing loss, nosebleeds, sore throat, tinnitus and trouble swallowing.    Eyes:  Negative for photophobia, pain and visual disturbance.   Respiratory:  Negative for cough, chest tightness, shortness of breath and wheezing.    Cardiovascular:  Negative for chest pain, palpitations and leg swelling.   Gastrointestinal:  Negative for abdominal pain, blood in stool, constipation, diarrhea, nausea and vomiting.   Endocrine: Negative for cold intolerance, heat intolerance, polydipsia and polyuria.   Genitourinary:  Negative for dysuria, flank pain and hematuria.   Musculoskeletal:  Negative for arthralgias, back pain, joint swelling, myalgias and neck pain.   Skin:  Negative for pallor, rash and wound.   Allergic/Immunologic: Negative for immunocompromised state.   Neurological:  Negative for dizziness, seizures and headaches.   Hematological:  Does not bruise/bleed easily.   Psychiatric/Behavioral:  Positive for confusion. Negative for sleep disturbance.        Objective   Physical Exam  Vitals and nursing note reviewed.   Constitutional:       General: She is not " in acute distress.     Appearance: Normal appearance.   HENT:      Head: Normocephalic.      Nose: Nose normal.   Eyes:      Conjunctiva/sclera: Conjunctivae normal.   Neck:      Vascular: No carotid bruit.   Cardiovascular:      Rate and Rhythm: Normal rate and regular rhythm.      Pulses: Normal pulses.      Heart sounds: Normal heart sounds.   Pulmonary:      Effort: Pulmonary effort is normal.      Breath sounds: Normal breath sounds.   Abdominal:      General: Bowel sounds are normal.      Palpations: Abdomen is soft.   Musculoskeletal:         General: Normal range of motion.      Cervical back: Normal range of motion.   Skin:     General: Skin is warm and dry.   Neurological:      Mental Status: She is alert and oriented to person, place, and time. Mental status is at baseline.   Psychiatric:         Mood and Affect: Mood normal.         Behavior: Behavior normal.         Assessment/Plan       New order for blood work provided at [facility] today. Will follow up pending results.     Diabetes: Most recent A1C 7.3%. Continue Metformin and Actos. Encourage updated Eye exam. Albumin ordered.    Hyperlipidemia: Continue Atorvastatin. Updated lab work ordered.   Hypertension: Blood pressure controlled at  today. Continue to monitor.   Vitamin D Deficiency: Updated lab work ordered.   Cognitive Impairment: Continue Aricept as prescribed. Safe at home per .       Mammogram: July 2023, negative.   Bone Density: July 2022, normal.   Medicare Wellness: Due at follow up appointment.   COVID Vaccine: November 2021.   Prevnar 20: July 2023.   Tdap: July 2012.     PRAVIN Haskins-CNS 03/14/24 11:13 AM

## 2024-03-29 ENCOUNTER — OFFICE VISIT (OUTPATIENT)
Dept: GERIATRIC MEDICINE | Facility: CLINIC | Age: 79
End: 2024-03-29
Payer: MEDICARE

## 2024-03-29 ENCOUNTER — APPOINTMENT (OUTPATIENT)
Dept: GERIATRIC MEDICINE | Facility: CLINIC | Age: 79
End: 2024-03-29
Payer: MEDICARE

## 2024-03-29 VITALS
HEART RATE: 72 BPM | SYSTOLIC BLOOD PRESSURE: 147 MMHG | TEMPERATURE: 98.4 F | BODY MASS INDEX: 21.97 KG/M2 | DIASTOLIC BLOOD PRESSURE: 59 MMHG | WEIGHT: 128 LBS

## 2024-03-29 DIAGNOSIS — R69 TAKING MULTIPLE MEDICATIONS FOR CHRONIC DISEASE: ICD-10-CM

## 2024-03-29 DIAGNOSIS — F01.C3 SEVERE VASCULAR DEMENTIA WITH MOOD DISTURBANCE (MULTI): ICD-10-CM

## 2024-03-29 DIAGNOSIS — Z13.220 SCREENING FOR HYPERLIPIDEMIA: ICD-10-CM

## 2024-03-29 DIAGNOSIS — M17.0 PRIMARY OSTEOARTHRITIS OF BOTH KNEES: ICD-10-CM

## 2024-03-29 DIAGNOSIS — E78.5 HYPERLIPIDEMIA, UNSPECIFIED HYPERLIPIDEMIA TYPE: ICD-10-CM

## 2024-03-29 DIAGNOSIS — Z13.21 ENCOUNTER FOR VITAMIN DEFICIENCY SCREENING: ICD-10-CM

## 2024-03-29 DIAGNOSIS — Z00.00 HEALTHCARE MAINTENANCE: ICD-10-CM

## 2024-03-29 DIAGNOSIS — R41.89 COGNITIVE IMPAIRMENT: ICD-10-CM

## 2024-03-29 DIAGNOSIS — E11.69 DM TYPE 2 WITH DIABETIC MIXED HYPERLIPIDEMIA (MULTI): ICD-10-CM

## 2024-03-29 DIAGNOSIS — R63.0 POOR APPETITE: ICD-10-CM

## 2024-03-29 DIAGNOSIS — I10 HTN (HYPERTENSION), BENIGN: ICD-10-CM

## 2024-03-29 DIAGNOSIS — E55.9 VITAMIN D DEFICIENCY: ICD-10-CM

## 2024-03-29 DIAGNOSIS — Z01.89 ENCOUNTER FOR GERIATRIC ASSESSMENT: Primary | ICD-10-CM

## 2024-03-29 DIAGNOSIS — E78.2 DM TYPE 2 WITH DIABETIC MIXED HYPERLIPIDEMIA (MULTI): ICD-10-CM

## 2024-03-29 PROCEDURE — 83735 ASSAY OF MAGNESIUM: CPT | Performed by: NURSE PRACTITIONER

## 2024-03-29 PROCEDURE — 36415 COLL VENOUS BLD VENIPUNCTURE: CPT | Performed by: NURSE PRACTITIONER

## 2024-03-29 PROCEDURE — 80061 LIPID PANEL: CPT | Performed by: NURSE PRACTITIONER

## 2024-03-29 PROCEDURE — 1170F FXNL STATUS ASSESSED: CPT | Performed by: NURSE PRACTITIONER

## 2024-03-29 PROCEDURE — 83036 HEMOGLOBIN GLYCOSYLATED A1C: CPT | Performed by: NURSE PRACTITIONER

## 2024-03-29 PROCEDURE — 82306 VITAMIN D 25 HYDROXY: CPT | Performed by: NURSE PRACTITIONER

## 2024-03-29 PROCEDURE — 3077F SYST BP >= 140 MM HG: CPT | Performed by: NURSE PRACTITIONER

## 2024-03-29 PROCEDURE — 1160F RVW MEDS BY RX/DR IN RCRD: CPT | Performed by: NURSE PRACTITIONER

## 2024-03-29 PROCEDURE — 1159F MED LIST DOCD IN RCRD: CPT | Performed by: NURSE PRACTITIONER

## 2024-03-29 PROCEDURE — 84443 ASSAY THYROID STIM HORMONE: CPT | Performed by: NURSE PRACTITIONER

## 2024-03-29 PROCEDURE — 80053 COMPREHEN METABOLIC PANEL: CPT | Performed by: NURSE PRACTITIONER

## 2024-03-29 PROCEDURE — 99215 OFFICE O/P EST HI 40 MIN: CPT | Performed by: NURSE PRACTITIONER

## 2024-03-29 PROCEDURE — 3078F DIAST BP <80 MM HG: CPT | Performed by: NURSE PRACTITIONER

## 2024-03-29 PROCEDURE — 85025 COMPLETE CBC W/AUTO DIFF WBC: CPT | Performed by: NURSE PRACTITIONER

## 2024-03-29 PROCEDURE — 99205 OFFICE O/P NEW HI 60 MIN: CPT | Performed by: NURSE PRACTITIONER

## 2024-03-29 RX ORDER — DONEPEZIL HYDROCHLORIDE 10 MG/1
10 TABLET, FILM COATED ORAL NIGHTLY
Qty: 30 TABLET | Refills: 11 | Status: SHIPPED | OUTPATIENT
Start: 2024-03-29 | End: 2025-03-29

## 2024-03-29 RX ORDER — MIRTAZAPINE 7.5 MG/1
7.5 TABLET, FILM COATED ORAL NIGHTLY
Qty: 30 TABLET | Refills: 2 | Status: SHIPPED | OUTPATIENT
Start: 2024-03-29 | End: 2024-05-08 | Stop reason: SDUPTHER

## 2024-03-29 ASSESSMENT — MONTREAL COGNITIVE ASSESSMENT (MOCA)
VISUOSPATIAL/EXECUTIVE SUBSCORE: 1
5. MEMORY TRIALS: 0
7. [VIGILENCE] TAP WHEN HEARING DESIGNATED LETTER: 1
8. SERIAL SUBTRACTION OF 7S: 0
9. REPEAT EACH SENTENCE: 1
WHAT LEVEL OF EDUCATION WAS ATTAINED: 1
10. [FLUENCY] NAME WORDS STARTING WITH DESIGNATED LETTER: 0
13. ORIENTATION SUBSCORE: 2
12. MEMORY INDEX SCORE: 0
11. FOR EACH PAIR OF WORDS, WHAT CATEGORY DO THEY BELONG TO (OUT OF 2): 0
4. NAME EACH OF THE THREE ANIMALS SHOWN: 2
WHAT IS THE TOTAL SCORE (OUT OF 30): 9
6. READ LIST OF DIGITS [FORWARD/BACKWARD]: 1

## 2024-03-29 ASSESSMENT — GERIATRIC DEPRESSION SCALE SHORT VERSION (GDS-SV)
ARE YOU AFRAID THAT SOMETHING BAD IS GOING TO HAPPEN TO YOU: NO
DO YOU THINK IT IS WONDERFUL TO BE ALIVE NOW: YES
HAVE YOU DROPPED MANY OF YOUR ACTIVITIES AND INTERESTS?: YES
DO YOU FEEL HAPPY MOST OF THE TIME: YES
DO YOU FEEL PRETTY WORTHLESS THE WAY YOU ARE NOW: YES
DO YOU FEEL YOU HAVE MORE PROBLEMS WITH MEMORY THAN MOST: YES
DO YOU FEEL FULL OF ENERGY: YES
DO YOU PREFER TO STAY AT HOME, RATHER THAN GOING OUT AND DOING NEW THINGS: YES
ARE YOU BASICALLY SATISFIED WITH YOUR LIFE: YES
DO YOU OFTEN FEEL HELPLESS: NO
GDS TOTAL SCORE: 4
DO YOU FEEL THAT YOUR LIFE IS EMPTY: NO
DO YOU FEEL THAT YOUR SITUATION IS HOPELESS: NO
DO YOU OFTEN GET BORED: NO
DO YOU THINK THAT MOST PEOPLE ARE BETTER OFF THAN YOU ARE: NO
ARE YOU IN GOOD SPIRITS MOST OF THE TIME: YES

## 2024-03-29 ASSESSMENT — ACTIVITIES OF DAILY LIVING (ADL)
JUDGMENT_ADEQUATE_SAFELY_COMPLETE_DAILY_ACTIVITIES: YES
PATIENT'S MEMORY ADEQUATE TO SAFELY COMPLETE DAILY ACTIVITIES?: YES
PREPARING_MEALS: TOTAL CARE
DRESSING YOURSELF: INDEPENDENT
BATHING: INDEPENDENT
HEARING - LEFT EAR: FUNCTIONAL
DOING_HOUSEWORK: INDEPENDENT
TAKING_MEDICATION: INDEPENDENT
MANAGING_FINANCES: INDEPENDENT
USING_TRANSPORTATION: INDEPENDENT
WALKS IN HOME: INDEPENDENT
GROCERY_SHOPPING: NEEDS ASSISTANCE
GROOMING: INDEPENDENT
PILL_BOX_USED: YES
ADEQUATE_TO_COMPLETE_ADL: YES
TOILETING: INDEPENDENT
USING_TELEPHONE: INDEPENDENT
HEARING - RIGHT EAR: FUNCTIONAL
FEEDING YOURSELF: INDEPENDENT
STILL_DRIVING: YES
EATING: INDEPENDENT
NEEDS_ASSISTANCE_WITH_FOOD: INDEPENDENT

## 2024-03-29 NOTE — PATIENT INSTRUCTIONS
General brain health guidelines:  - make sure your medical conditions are well controlled (e.g., high blood pressure, high cholesterol, diabetes, sleep apnea etc)  - do not smoke or chew tobacco  - limit alcohol use to no more than 1 alcoholic beverage per day   - address any sensory deficits (e.g., proper glasses for poor eyesight, hearing aids for hearing loss)  - use a weekly pill box  - eat a heart healthy diet (fruits, vegetables, lean meats, fatty fish, whole grains. Limit processed foods)  - exercise or walk. Gradually increase to the goal of 5 days per week, 30 minutes at a time  - try to get at least 7 hours of quality sleep per night  - keep yourself mentally active daily by reading, playing cards, doing word searches, puzzles, etc.  - challenge your brain with new cognitive tasks (new hobby, crafts, take a class, learn a language)  - stay socially active by being part of a group or organization     brain exercise apps and websites:  - [email or web address]  - [email or web address]  - www.[email or web address]  - [email or web address]  -[email or web address]

## 2024-03-29 NOTE — PROGRESS NOTES
Subjective   Patient ID: Inge Clark is a 79 y.o. female who presents for geriatric assessment.    Identifying Information                              : 1945           Assessment date: 3/29/2024    Objective     Patient accompanied by:  , son and daughter         History provided by: patient and family    CONCERNS IDENTIFIED BY NURSING AND SOCIAL WORK (Safety Risks, Health Issues, POA not in Chart, etc)     1. Cognitive decline     2.  Imbalance, falls     3.  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   Tobacco Use    Smoking status: Never    Smokeless tobacco: Never   Vaping Use    Vaping Use: Never used   Substance and Sexual Activity    Alcohol use: Yes     Alcohol/week: 1.0 standard drink of alcohol     Types: 1 Standard drinks or equivalent per week     Comment: Socially    Drug use: Never    Sexual activity: Defer   Other Topics Concern    None   Social History Narrative    None     Social Determinants of Health     Financial Resource Strain: Not on file   Food Insecurity: Not on file   Transportation Needs: Not on file   Physical Activity: Not on file   Stress: Not on file   Social Connections: Not on file   Intimate Partner Violence: Not on file   Housing Stability: Not on file        ENCOUNTER SCREENING RESULTS          NURSING ASSESSMENT    ADL Screening  Patient's Vision Adequate to Safely Complete Daily Activities: Yes  Patient's Memory Adequate to Safely Complete Daily Activities: Yes  Patient Able to Express Needs/Desires: Yes  Which is your dominant hand?: Right  Dressing: Independent  Grooming: Independent  Feeding: Independent  Bathing:  Independent  Toileting: Independent  In/Out Bed: Independent  Walks in Home: Independent (off balance)  Weakness of Legs: Both (2 major falls within the past 5 years, loses balance, right hip)  Weakness of Arms/Hands: None (back of her neck leans forward uses cream for pain)  Hearing - Right Ear: Functional  Hearing - Left Ear: Functional     IADL's  Preparing Meals: Total care (needs home delivered meals, stopped cooking a long time ago)  Doing Housework: Independent  Laundry: Moderate  Managing Finances: Independent (still uses paper to pay bills, lost the tax information, need suppport with finances)  Using Transportation: Independent  Still Driving: Yes (drives short distances)           Nutrition and Exercise  Current Diet: Well Balanced Diet  Appetite:: Fair (eats better when family is around, usually eats light)  Food Consistency:: Regular  Liquids Consistency:: Thin  Changes in Weight?: Yes (lost 30 pounds over time)     SOCIAL WORK ASSESSMENT    Advance Directives/Legal/Financial     DPOA for healthcare:   and kids are POA       DPOA for finances: NA, but thinking about it. Patient always handled the finances and taxes, but now lost the info for taxes and they are going to help. Patient and  still pay bills in person and have not set up online services.       Legal guardian:  NA     Living Will: yes     On any form of disability? no If so, explain:     ? no  If so, explain:     Significant financial stressors: none noted.    Family History      Parent or sibling with neurodegenerative diagnosis: NA     Extended family members with relevant health history: NA    Living Situation      Type of residence: Patient and  reside in 3-floor home in [place], since 1986.     People living in the home: patient and . They feel safe there.    Supportive Relationships (Informal Support)     Spouse/partner information:  to Adriel for 57 yrs. He appears very attentive, but has his  own health issues.     Children Information:  son, Deacon, and daughter, Dylon. Deacon is in [place] and Dylon in [place]. They keep in touch.     Other social supports: none noted.    Formal Supports     Engaged community services (Emergency Alert, HHA, MOW, Case Management, Etc.):  None currently, but wants to consider meals-on-wheels, because patient and  eat junk, especially fast food.    Mental Health     Sleep: Sleeps on the couch, then wakes up and goes to bed about midnight and then gets up at 5 or 6.     Energy: Fair. Does like to be out in the yard in warmer weather, but  does most of the yard work.     Mood: within normal limits     Affect: calm and pleasant     Notable Loss/Grief: NA     Self-harm/suicidal thoughts, plan: None Reported     Interests/Hobbies/Activities/Daily Routine: Used to read but doesn't anymore. Mainly sits around. Does still drive, but not on the highway, nor at night.      History of outpatient psychiatric services: NA     History of inpatient psychiatric services (hospitalization): NA     Medications for mental health past or present: NA     History of addiction services: NA    Assessment/Plan   Impression:  Patient has a 5 yr hx of cognitive decline, with particular decline in the past 2 yrs.  is very attentive but has health issues of his own (PD) and may have a bit of memory loss, as well. Main concerns are driving and poor nutrition. Pt should have supervision of meds and routine, just to ensure that she is doing alright. Significant impairment.    Plan: Review of testing. Offer input and recommendations. Encouraged meals-on-wheels. Patient should not drive due to severity of dementia. Offer resources to family, such as [facility]. Assn, caregiver class, future planning. They were going to complete POA for finances. Already have it in place for health care.

## 2024-03-29 NOTE — PROGRESS NOTES
Subjective   Ms. Clark 79 y.o. year old female is accompanied by: , son and daughter  Consult Requested By:  Family  Reason for consultation or primary concern: Establish Care, Memory Loss, and New Patient Visit    Inge Clark is a 79 y.o. female who presents today for a geriatric assessment. PMH significant for mild cognitive impairment currently taking donepezil 5 mg. Per chart review, [facility] first mentioned in 2022 when family mentioned patient having forgetfulness. Comparing CTH from 6/2020 to one from 3/4/2023 (most recent) there is a noticeable difference in the size of ventricles, cisterns. Today, patient is accompanied by her  along with son and daughter. Son and daughter suspect patient is not taking medications, especially those to treat dementia. Dates on medication bottles confirm this. Son states they would like to know what actions can they take now to maintain patient's well-being and safety. They want a confirmation of dementia diagnosis. Given today's MoCA score of 9/30, it is definite indication patient has moderate-to-severe dementia.         What matters most: Plan of care for patient and family going forward.       Visual: glasses No Last exam: 2023  Hearing: hearing No Last exam: NA  Dental: dentures No Last exam: 2024    Sleep: how many hours at night unknown, sleep aides: None    Is dependant or requires assistance in the following BADL: None, but reports weakness of bilateral legs. PT evaluation?  Is dependant or requires assistance in the following Instrumental ADL: Grocery shopping, preparing meals, laundry      Advanced Directives on file: <no information>    Review of Systems   Unable to perform ROS: Dementia       Objective   /59 (BP Location: Left arm, Patient Position: Sitting, BP Cuff Size: Adult)   Pulse 72   Temp 36.9 °C (98.4 °F) (Tympanic)   Wt 58.1 kg (128 lb)   BMI 21.97 kg/m²     Current Outpatient Medications on File Prior to Visit   Medication Sig  Dispense Refill    atenoloL-chlorthalidone (Tenoretic) 100-25 mg tablet Take 1 tablet by mouth once daily. 90 tablet 0    atorvastatin (Lipitor) 40 mg tablet Take 1 tablet (40 mg) by mouth once daily. 90 tablet 0    BABY ASPIRIN ORAL Take 1 tablet by mouth once daily.      cholecalciferol (Vitamin D-3) 50 mcg (2,000 unit) capsule Take 1 capsule (50 mcg) by mouth early in the morning..      coenzyme Q-10 100 mg capsule Take 1 capsule (100 mg) by mouth once daily.      metFORMIN (Glucophage) 500 mg tablet Take 1.5 tablets (750 mg) by mouth 2 times a day. 270 tablet 0    multivitamin tablet Take 1 tablet by mouth once daily.      OneTouch Delica Plus Lancet 30 gauge misc       OneTouch Ultra Test strip TEST ONCE DAILY      pioglitazone (Actos) 15 mg tablet Take 1 tablet (15 mg) by mouth once daily. 90 tablet 0    [DISCONTINUED] donepezil (Aricept) 5 mg tablet Take 1 tablet (5 mg) by mouth once daily. 90 tablet 0    [DISCONTINUED] OneTouch Ultra2 Meter misc        No current facility-administered medications on file prior to visit.     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)    Poor appetite    Taking multiple medications for chronic disease    Encounter for vitamin deficiency screening    Screening for hyperlipidemia     Past Medical History:   Diagnosis Date    Essential (primary) hypertension 12/29/2022    HTN (hypertension), benign    Insect bite (nonvenomous) of scalp, initial encounter (CODE) 06/05/2017    Tick bite of scalp, initial encounter    Listeriosis, unspecified 05/23/2016    Listeria infection    Other conditions influencing health status 01/29/2018    Normal colonoscopy    Pyelonephritis of left  kidney 04/11/2023    Type 2 diabetes mellitus with other specified complication (CMS/HCC) 12/29/2022    DM type 2 with diabetic mixed hyperlipidemia    Unilateral primary osteoarthritis, right hip 12/09/2019    Primary osteoarthritis of right hip    Vitamin D deficiency, unspecified     Vitamin D deficiency     Past Surgical History:   Procedure Laterality Date    OTHER SURGICAL HISTORY  09/30/2019    Tonsillectomy with adenoidectomy     The 10-year ASCVD risk score (Blanca STOREY, et al., 2019) is: 53.5%    Values used to calculate the score:      Age: 79 years      Sex: Female      Is Non- : No      Diabetic: Yes      Tobacco smoker: No      Systolic Blood Pressure: 147 mmHg      Is BP treated: Yes      HDL Cholesterol: 61.4 mg/dL      Total Cholesterol: 151 mg/dL      MOST RECENT LABS from 2022 (labs ordered 2 wks ago & 6 months ago)  Recent Results (from the past 89082 hour(s))   Comprehensive Metabolic Panel    Collection Time: 12/29/22 10:37 AM   Result Value Ref Range    Glucose 136 (H) 74 - 99 mg/dL    Sodium 139 136 - 145 mmol/L    Potassium 3.5 3.5 - 5.3 mmol/L    Chloride 98 98 - 107 mmol/L    Bicarbonate 31 21 - 32 mmol/L    Anion Gap 14 10 - 20 mmol/L    Urea Nitrogen 27 (H) 6 - 23 mg/dL    Creatinine 0.86 0.50 - 1.05 mg/dL    GFR Female 70 >90 mL/min/1.73m2    Calcium 10.0 8.6 - 10.6 mg/dL    Albumin 4.7 3.4 - 5.0 g/dL    Alkaline Phosphatase 76 33 - 136 U/L    Total Protein 7.9 6.4 - 8.2 g/dL    AST 35 9 - 39 U/L    Total Bilirubin 0.5 0.0 - 1.2 mg/dL    ALT (SGPT) 27 7 - 45 U/L   Hemoglobin A1C    Collection Time: 12/29/22 10:37 AM   Result Value Ref Range    Hemoglobin A1C 7.3 (A) %    Estimated Average Glucose 163 MG/DL   Urinalysis with Reflex Microscopic    Collection Time: 12/29/22 10:37 AM   Result Value Ref Range    Color, Urine YELLOW STRAW,YELLOW    Appearance, Urine CLEAR CLEAR    Specific Gravity, Urine 1.018 1.005 - 1.035    pH, Urine 5.0 5.0 - 8.0    Protein, Urine  NEGATIVE NEGATIVE mg/dL    Glucose, Urine >=500 (3+) (A) NEGATIVE mg/dL    Blood, Urine NEGATIVE NEGATIVE    Ketones, Urine NEGATIVE NEGATIVE mg/dL    Bilirubin, Urine NEGATIVE NEGATIVE    Urobilinogen, Urine <2.0 0.0 - 1.9 mg/dL    Nitrite, Urine NEGATIVE NEGATIVE    Leukocyte Esterase, Urine LARGE (3+) (A) NEGATIVE   Lipid Panel    Collection Time: 12/29/22 10:37 AM   Result Value Ref Range    Cholesterol 134 0 - 199 mg/dL    HDL 58.7 mg/dL    Cholesterol/HDL Ratio 2.3     LDL 39 0 - 99 mg/dL    VLDL 36 0 - 40 mg/dL    Triglycerides 182 (H) 0 - 149 mg/dL   Urinalysis Microscopic    Collection Time: 12/29/22 10:37 AM   Result Value Ref Range    WBC, Urine 21 (A) 0 - 5 /HPF    RBC, Urine None 0 - 5 /HPF    Squamous Epithelial Cells, Urine <1 /HPF    Mucus, Urine 2+ /LPF    Hyaline Casts, Urine 1+ (A) /LPF   TSH with reflex to Free T4 if abnormal    Collection Time: 03/29/24  4:00 PM   Result Value Ref Range    Thyroid Stimulating Hormone 1.12 0.44 - 3.98 mIU/L   CBC and Auto Differential    Collection Time: 03/29/24  4:00 PM   Result Value Ref Range    WBC 7.4 4.4 - 11.3 x10*3/uL    nRBC 0.0 0.0 - 0.0 /100 WBCs    RBC 4.15 4.00 - 5.20 x10*6/uL    Hemoglobin 13.4 12.0 - 16.0 g/dL    Hematocrit 39.1 36.0 - 46.0 %    MCV 94 80 - 100 fL    MCH 32.3 26.0 - 34.0 pg    MCHC 34.3 32.0 - 36.0 g/dL    RDW 12.6 11.5 - 14.5 %    Platelets 203 150 - 450 x10*3/uL    Neutrophils % 59.6 40.0 - 80.0 %    Immature Granulocytes %, Automated 0.1 0.0 - 0.9 %    Lymphocytes % 31.9 13.0 - 44.0 %    Monocytes % 6.6 2.0 - 10.0 %    Eosinophils % 1.5 0.0 - 6.0 %    Basophils % 0.3 0.0 - 2.0 %    Neutrophils Absolute 4.44 1.60 - 5.50 x10*3/uL    Immature Granulocytes Absolute, Automated 0.01 0.00 - 0.50 x10*3/uL    Lymphocytes Absolute 2.37 0.80 - 3.00 x10*3/uL    Monocytes Absolute 0.49 0.05 - 0.80 x10*3/uL    Eosinophils Absolute 0.11 0.00 - 0.40 x10*3/uL    Basophils Absolute 0.02 0.00 - 0.10 x10*3/uL   Comprehensive Metabolic Panel     Collection Time: 03/29/24  4:00 PM   Result Value Ref Range    Glucose 119 (H) 74 - 99 mg/dL    Sodium 141 136 - 145 mmol/L    Potassium 3.7 3.5 - 5.3 mmol/L    Chloride 100 98 - 107 mmol/L    Bicarbonate 28 21 - 32 mmol/L    Anion Gap 17 10 - 20 mmol/L    Urea Nitrogen 21 6 - 23 mg/dL    Creatinine 0.88 0.50 - 1.05 mg/dL    eGFR 68 >60 mL/min/1.73m*2    Calcium 10.3 8.6 - 10.6 mg/dL    Albumin 4.5 3.4 - 5.0 g/dL    Alkaline Phosphatase 82 33 - 136 U/L    Total Protein 7.7 6.4 - 8.2 g/dL    AST 28 9 - 39 U/L    Bilirubin, Total 0.7 0.0 - 1.2 mg/dL    ALT 23 7 - 45 U/L   Magnesium    Collection Time: 03/29/24  4:00 PM   Result Value Ref Range    Magnesium 1.46 (L) 1.60 - 2.40 mg/dL   Vitamin D 25-Hydroxy,Total (for eval of Vitamin D levels)    Collection Time: 03/29/24  4:00 PM   Result Value Ref Range    Vitamin D, 25-Hydroxy, Total 36 30 - 100 ng/mL   Hemoglobin A1C    Collection Time: 03/29/24  4:00 PM   Result Value Ref Range    Hemoglobin A1C 8.0 (H) see below %    Estimated Average Glucose 183 Not Established mg/dL   Lipid Panel    Collection Time: 03/29/24  4:00 PM   Result Value Ref Range    Cholesterol 151 0 - 199 mg/dL    HDL-Cholesterol 61.4 mg/dL    Cholesterol/HDL Ratio 2.5     LDL Calculated 44 <=99 mg/dL    VLDL 46 (H) 0 - 40 mg/dL    Triglycerides 228 (H) 0 - 149 mg/dL    Non HDL Cholesterol 90 0 - 149 mg/dL        Physical Exam  Vitals reviewed.   Constitutional:       General: She is awake.      Appearance: Normal appearance. She is well-groomed and normal weight.      Comments: Pt w/ normal weight, but family reports continued weight loss. Reportedly often eats 1-2 meals a day.   HENT:      Head: Normocephalic and atraumatic.      Jaw: There is normal jaw occlusion.      Right Ear: Hearing, tympanic membrane, ear canal and external ear normal.      Left Ear: Hearing, tympanic membrane, ear canal and external ear normal.      Nose: Rhinorrhea present. Rhinorrhea is clear.      Mouth/Throat:       Lips: Pink.      Mouth: Mucous membranes are moist.      Dentition: Abnormal dentition.      Pharynx: Oropharynx is clear. Uvula midline.      Comments: Missing molars back right side. Following up with dentist.  Eyes:      General: Lids are normal.      Conjunctiva/sclera: Conjunctivae normal.      Pupils: Pupils are equal, round, and reactive to light.   Cardiovascular:      Rate and Rhythm: Normal rate and regular rhythm.      Pulses:           Carotid pulses are 2+ on the right side and 2+ on the left side.       Radial pulses are 2+ on the right side and 2+ on the left side.        Dorsalis pedis pulses are 1+ on the right side and 1+ on the left side.      Heart sounds: Normal heart sounds.   Pulmonary:      Effort: Pulmonary effort is normal.      Breath sounds: Normal breath sounds and air entry.   Abdominal:      General: Abdomen is flat. Bowel sounds are normal.      Palpations: Abdomen is soft.   Musculoskeletal:      Cervical back: Normal range of motion and neck supple.   Feet:      Right foot:      Skin integrity: Skin integrity normal.      Left foot:      Skin integrity: Skin integrity normal.      Toenail Condition: Left toenails are abnormally thick. Fungal disease present.     Comments: Left great toe with abnormally thick toenail with fungal disease  Skin:     General: Skin is warm and dry.   Neurological:      Mental Status: She is alert.      Comments: Oriented to self   Psychiatric:         Attention and Perception: She is inattentive.         Mood and Affect: Mood is anxious.         Speech: Speech normal.         Behavior: Behavior is agitated. Behavior is cooperative.         Cognition and Memory: Memory is impaired.         Judgment: Judgment is impulsive.           MoCA:  9/30  GDS:    4/15    Home Safety Concerns: Transitions from cement to gravel, shuffling gait, railing is up. Lives on more than one level    Assessment/Plan      Problem List Items Addressed This Visit              ICD-10-CM    DM type 2 with diabetic mixed hyperlipidemia (CMS/[facility]) E11.69, E78.2     C/w Metformin and actos  C/w statin, low-dose aspirin  Will recheck A1c         Relevant Orders    TSH with reflex to Free T4 if abnormal (Completed)    CBC and Auto Differential (Completed)    Comprehensive Metabolic Panel (Completed)    Hemoglobin A1C (Completed)    HTN (hypertension), benign I10     C/w atenolol-chlorthalidone         HLD (hyperlipidemia) E78.5     C/w atorvastatin  Will recheck labs today         Osteoarthritis of both knees M17.0    Vitamin D deficiency E55.9    Relevant Orders    Vitamin D 25-Hydroxy,Total (for eval of Vitamin D levels) (Completed)    Cognitive impairment R41.89    Encounter for geriatric assessment - Primary Z01.89    Relevant Orders    Albumin , Urine Random    Follow Up In Geriatrics    Severe vascular dementia with mood disturbance (CMS/[facility]) F01.C3    Relevant Medications    donepezil (Aricept) 10 mg tablet    Poor appetite R63.0     Patient reports a lack of appetite most of the time. Usually eats breakfast and snacks for other meals. Agreeable to try appetite stimulant.           Relevant Medications    mirtazapine (Remeron) 7.5 mg tablet    Taking multiple medications for chronic disease R69     Family requesting assistance with community resources to assist with supporting patient nutritionally as well as with medication management. Agreeable to referral for support from ACO program. Son, Deacon, will be contact as both patient and  are forgetful.         Relevant Orders    Referral to Population Health    Encounter for vitamin deficiency screening Z13.21    Relevant Orders    Magnesium (Completed)    Screening for hyperlipidemia Z13.220    Relevant Orders    Lipid Panel (Completed)     Other Visit Diagnoses         Codes    Healthcare maintenance     Z00.00          Elsa Ball, APRN-CNP

## 2024-03-30 LAB
25(OH)D3 SERPL-MCNC: 36 NG/ML (ref 30–100)
ALBUMIN SERPL BCP-MCNC: 4.5 G/DL (ref 3.4–5)
ALP SERPL-CCNC: 82 U/L (ref 33–136)
ALT SERPL W P-5'-P-CCNC: 23 U/L (ref 7–45)
ANION GAP SERPL CALC-SCNC: 17 MMOL/L (ref 10–20)
AST SERPL W P-5'-P-CCNC: 28 U/L (ref 9–39)
BASOPHILS # BLD AUTO: 0.02 X10*3/UL (ref 0–0.1)
BASOPHILS NFR BLD AUTO: 0.3 %
BILIRUB SERPL-MCNC: 0.7 MG/DL (ref 0–1.2)
BUN SERPL-MCNC: 21 MG/DL (ref 6–23)
CALCIUM SERPL-MCNC: 10.3 MG/DL (ref 8.6–10.6)
CHLORIDE SERPL-SCNC: 100 MMOL/L (ref 98–107)
CHOLEST SERPL-MCNC: 151 MG/DL (ref 0–199)
CHOLESTEROL/HDL RATIO: 2.5
CO2 SERPL-SCNC: 28 MMOL/L (ref 21–32)
CREAT SERPL-MCNC: 0.88 MG/DL (ref 0.5–1.05)
EGFRCR SERPLBLD CKD-EPI 2021: 68 ML/MIN/1.73M*2
EOSINOPHIL # BLD AUTO: 0.11 X10*3/UL (ref 0–0.4)
EOSINOPHIL NFR BLD AUTO: 1.5 %
ERYTHROCYTE [DISTWIDTH] IN BLOOD BY AUTOMATED COUNT: 12.6 % (ref 11.5–14.5)
EST. AVERAGE GLUCOSE BLD GHB EST-MCNC: 183 MG/DL
GLUCOSE SERPL-MCNC: 119 MG/DL (ref 74–99)
HBA1C MFR BLD: 8 %
HCT VFR BLD AUTO: 39.1 % (ref 36–46)
HDLC SERPL-MCNC: 61.4 MG/DL
HGB BLD-MCNC: 13.4 G/DL (ref 12–16)
IMM GRANULOCYTES # BLD AUTO: 0.01 X10*3/UL (ref 0–0.5)
IMM GRANULOCYTES NFR BLD AUTO: 0.1 % (ref 0–0.9)
LDLC SERPL CALC-MCNC: 44 MG/DL
LYMPHOCYTES # BLD AUTO: 2.37 X10*3/UL (ref 0.8–3)
LYMPHOCYTES NFR BLD AUTO: 31.9 %
MAGNESIUM SERPL-MCNC: 1.46 MG/DL (ref 1.6–2.4)
MCH RBC QN AUTO: 32.3 PG (ref 26–34)
MCHC RBC AUTO-ENTMCNC: 34.3 G/DL (ref 32–36)
MCV RBC AUTO: 94 FL (ref 80–100)
MONOCYTES # BLD AUTO: 0.49 X10*3/UL (ref 0.05–0.8)
MONOCYTES NFR BLD AUTO: 6.6 %
NEUTROPHILS # BLD AUTO: 4.44 X10*3/UL (ref 1.6–5.5)
NEUTROPHILS NFR BLD AUTO: 59.6 %
NON HDL CHOLESTEROL: 90 MG/DL (ref 0–149)
NRBC BLD-RTO: 0 /100 WBCS (ref 0–0)
PLATELET # BLD AUTO: 203 X10*3/UL (ref 150–450)
POTASSIUM SERPL-SCNC: 3.7 MMOL/L (ref 3.5–5.3)
PROT SERPL-MCNC: 7.7 G/DL (ref 6.4–8.2)
RBC # BLD AUTO: 4.15 X10*6/UL (ref 4–5.2)
SODIUM SERPL-SCNC: 141 MMOL/L (ref 136–145)
TRIGL SERPL-MCNC: 228 MG/DL (ref 0–149)
TSH SERPL-ACNC: 1.12 MIU/L (ref 0.44–3.98)
VLDL: 46 MG/DL (ref 0–40)
WBC # BLD AUTO: 7.4 X10*3/UL (ref 4.4–11.3)

## 2024-04-02 DIAGNOSIS — E55.9 VITAMIN D DEFICIENCY: ICD-10-CM

## 2024-04-02 DIAGNOSIS — E78.5 HYPERLIPIDEMIA, UNSPECIFIED HYPERLIPIDEMIA TYPE: ICD-10-CM

## 2024-04-02 DIAGNOSIS — E11.69 DM TYPE 2 WITH DIABETIC MIXED HYPERLIPIDEMIA (MULTI): ICD-10-CM

## 2024-04-02 DIAGNOSIS — I10 HTN (HYPERTENSION), BENIGN: ICD-10-CM

## 2024-04-02 DIAGNOSIS — E78.2 DM TYPE 2 WITH DIABETIC MIXED HYPERLIPIDEMIA (MULTI): ICD-10-CM

## 2024-04-02 RX ORDER — LANCETS 30 GAUGE
1 EACH MISCELLANEOUS DAILY
Qty: 1 EACH | Refills: 0 | Status: SHIPPED | OUTPATIENT
Start: 2024-04-02

## 2024-04-02 NOTE — TELEPHONE ENCOUNTER
----- Message from PRAVIN Haskins-CNS sent at 4/1/2024  9:45 PM EDT -----  Please call patient with lab results. A1C is worse at 8.0%. Has she been consistent with her diabetic medications? If so, will need to adjust. Triglycerides are elevated. Remaining blood work is stable. Will need to repeat CMP, Lipids, and A1C prior to follow up appointment. Please reorder lab work. Thank you!

## 2024-04-02 NOTE — TELEPHONE ENCOUNTER
Patient states is consistent with Dm medication but unable to check blood sugar as glucometer is broken new glucometer pended

## 2024-04-03 ENCOUNTER — PATIENT OUTREACH (OUTPATIENT)
Dept: CARE COORDINATION | Facility: CLINIC | Age: 79
End: 2024-04-03
Payer: MEDICARE

## 2024-04-03 PROBLEM — Z01.89 ENCOUNTER FOR GERIATRIC ASSESSMENT: Status: ACTIVE | Noted: 2024-04-03

## 2024-04-03 PROBLEM — Z13.21 ENCOUNTER FOR VITAMIN DEFICIENCY SCREENING: Status: ACTIVE | Noted: 2024-04-03

## 2024-04-03 PROBLEM — R69 TAKING MULTIPLE MEDICATIONS FOR CHRONIC DISEASE: Status: ACTIVE | Noted: 2024-04-03

## 2024-04-03 PROBLEM — R63.0 POOR APPETITE: Status: ACTIVE | Noted: 2024-04-03

## 2024-04-03 PROBLEM — F01.C3 SEVERE VASCULAR DEMENTIA WITH MOOD DISTURBANCE (MULTI): Status: ACTIVE | Noted: 2024-04-03

## 2024-04-03 PROBLEM — Z13.220 SCREENING FOR HYPERLIPIDEMIA: Status: ACTIVE | Noted: 2024-04-03

## 2024-04-03 NOTE — PROGRESS NOTES
Outreach call to patient's son per referral from PCP. Left voicemail requesting return call.    YAEL Mcdermott   III   Population Health/Accountable Care Organization  Office Phone: [phone]  [email or web address]

## 2024-04-04 NOTE — ASSESSMENT & PLAN NOTE
Patient reports a lack of appetite most of the time. Usually eats breakfast and snacks for other meals. Agreeable to try appetite stimulant.

## 2024-04-04 NOTE — ASSESSMENT & PLAN NOTE
Family requesting assistance with community resources to assist with supporting patient nutritionally as well as with medication management. Agreeable to referral for support from ACO program. Son, Deacon, will be contact as both patient and  are forgetful.

## 2024-04-05 ENCOUNTER — PATIENT OUTREACH (OUTPATIENT)
Dept: CARE COORDINATION | Facility: CLINIC | Age: 79
End: 2024-04-05
Payer: MEDICARE

## 2024-04-05 NOTE — PROGRESS NOTES
Outreach call to patient's son per referral from PCP. Left voicemail requesting return call.     YAEL Mcdermott   III   Population Health/Accountable Care Organization  Office Phone: [phone]  [email or web address]

## 2024-04-08 DIAGNOSIS — E11.69 DM TYPE 2 WITH DIABETIC MIXED HYPERLIPIDEMIA (MULTI): Primary | ICD-10-CM

## 2024-04-08 DIAGNOSIS — E78.2 DM TYPE 2 WITH DIABETIC MIXED HYPERLIPIDEMIA (MULTI): Primary | ICD-10-CM

## 2024-04-08 RX ORDER — BLOOD SUGAR DIAGNOSTIC
STRIP MISCELLANEOUS
Qty: 200 STRIP | Refills: 3 | Status: SHIPPED | OUTPATIENT
Start: 2024-04-08

## 2024-04-08 NOTE — TELEPHONE ENCOUNTER
Refill request:    One Touch Ultra Test Strips  One Touch Ultra Control Solution    Pharmacy: [facility]

## 2024-04-12 ENCOUNTER — PATIENT OUTREACH (OUTPATIENT)
Dept: CARE COORDINATION | Facility: CLINIC | Age: 79
End: 2024-04-12
Payer: MEDICARE

## 2024-04-12 NOTE — PROGRESS NOTES
Outreach call to patient's son per referral from PCP. Left voicemail requesting return call.     YAEL Mcdermott   III   Population Health/Accountable Care Organization  Office Phone: [phone]  [email or web address]

## 2024-05-08 ENCOUNTER — TELEPHONE (OUTPATIENT)
Dept: PRIMARY CARE | Facility: CLINIC | Age: 79
End: 2024-05-08
Payer: MEDICARE

## 2024-05-08 DIAGNOSIS — E78.2 DM TYPE 2 WITH DIABETIC MIXED HYPERLIPIDEMIA (MULTI): ICD-10-CM

## 2024-05-08 DIAGNOSIS — E11.69 DM TYPE 2 WITH DIABETIC MIXED HYPERLIPIDEMIA (MULTI): ICD-10-CM

## 2024-05-08 DIAGNOSIS — R63.0 POOR APPETITE: ICD-10-CM

## 2024-05-08 DIAGNOSIS — I10 HTN (HYPERTENSION), BENIGN: ICD-10-CM

## 2024-05-08 RX ORDER — ATENOLOL AND CHLORTHALIDONE TABLET 100; 25 MG/1; MG/1
1 TABLET ORAL DAILY
Qty: 90 TABLET | Refills: 3 | Status: SHIPPED | OUTPATIENT
Start: 2024-05-08

## 2024-05-08 RX ORDER — MIRTAZAPINE 7.5 MG/1
7.5 TABLET, FILM COATED ORAL NIGHTLY
Qty: 30 TABLET | Refills: 2 | Status: SHIPPED | OUTPATIENT
Start: 2024-05-08 | End: 2024-05-23 | Stop reason: SDUPTHER

## 2024-05-08 RX ORDER — PIOGLITAZONEHYDROCHLORIDE 15 MG/1
15 TABLET ORAL DAILY
Qty: 90 TABLET | Refills: 3 | Status: SHIPPED | OUTPATIENT
Start: 2024-05-08

## 2024-05-08 NOTE — TELEPHONE ENCOUNTER
Rx Refill Request Telephone Encounter    Name:  Inge Clark DOB:  [id number]  Medication Name:  Mirtazapine  7.5 mg        Take 1 tablet by mouth once daily at bedtime  Specific Pharmacy location:  [facility]  Date of last appointment:  3/14/2024  Date of next appointment:  2024  Rx Refill Request Telephone Encounter    Name:  Inge Clark DOB:  [id number]  Medication Name:  Pioglitazone  15 mg        Take 1 tablet by mouth once daily  Rx Refill Request Telephone Encounter    Name:  Inge Clark DOB:  [id number]  Medication Name:  Atenolol-chlorthalidone  100-25 mg        Take 1 tablet by mouth once daily

## 2024-05-23 ENCOUNTER — OFFICE VISIT (OUTPATIENT)
Dept: GERIATRIC MEDICINE | Facility: CLINIC | Age: 79
End: 2024-05-23
Payer: MEDICARE

## 2024-05-23 VITALS
WEIGHT: 120 LBS | BODY MASS INDEX: 20.6 KG/M2 | DIASTOLIC BLOOD PRESSURE: 76 MMHG | SYSTOLIC BLOOD PRESSURE: 140 MMHG | OXYGEN SATURATION: 97 % | HEART RATE: 80 BPM

## 2024-05-23 DIAGNOSIS — R63.0 POOR APPETITE: ICD-10-CM

## 2024-05-23 DIAGNOSIS — Z01.89 ENCOUNTER FOR GERIATRIC ASSESSMENT: ICD-10-CM

## 2024-05-23 DIAGNOSIS — R63.4 WEIGHT LOSS, UNINTENTIONAL: Primary | ICD-10-CM

## 2024-05-23 PROCEDURE — 3078F DIAST BP <80 MM HG: CPT | Performed by: NURSE PRACTITIONER

## 2024-05-23 PROCEDURE — 1159F MED LIST DOCD IN RCRD: CPT | Performed by: NURSE PRACTITIONER

## 2024-05-23 PROCEDURE — 3077F SYST BP >= 140 MM HG: CPT | Performed by: NURSE PRACTITIONER

## 2024-05-23 PROCEDURE — 99215 OFFICE O/P EST HI 40 MIN: CPT | Performed by: NURSE PRACTITIONER

## 2024-05-23 PROCEDURE — 1160F RVW MEDS BY RX/DR IN RCRD: CPT | Performed by: NURSE PRACTITIONER

## 2024-05-23 RX ORDER — MIRTAZAPINE 15 MG/1
TABLET, FILM COATED ORAL
Qty: 90 TABLET | Refills: 1 | Status: SHIPPED | OUTPATIENT
Start: 2024-05-23

## 2024-05-23 ASSESSMENT — PATIENT HEALTH QUESTIONNAIRE - PHQ9
1. LITTLE INTEREST OR PLEASURE IN DOING THINGS: NOT AT ALL
SUM OF ALL RESPONSES TO PHQ9 QUESTIONS 1 AND 2: 0
2. FEELING DOWN, DEPRESSED OR HOPELESS: NOT AT ALL

## 2024-05-23 ASSESSMENT — PAIN SCALES - GENERAL: PAINLEVEL_OUTOF10: 0 - NO PAIN

## 2024-05-23 ASSESSMENT — PAIN - FUNCTIONAL ASSESSMENT: PAIN_FUNCTIONAL_ASSESSMENT: 0-10

## 2024-05-23 ASSESSMENT — ENCOUNTER SYMPTOMS
DEPRESSION: 0
OCCASIONAL FEELINGS OF UNSTEADINESS: 0
LOSS OF SENSATION IN FEET: 0

## 2024-05-23 NOTE — PROGRESS NOTES
Subjective   Ms. Clark 79 y.o. year old female is accompanied by: , son and daughter  Consult Requested By:  Family  Reason for consultation or primary concern: Follow-up      Previous note:  Inge Clark is a 79 y.o. female who presents today for a geriatric assessment. PMH significant for mild cognitive impairment currently taking donepezil 5 mg. Per chart review, [facility] first mentioned in 2022 when family mentioned patient having forgetfulness. Comparing CTH from 6/2020 to one from 3/4/2023 (most recent) there is a noticeable difference in the size of ventricles, cisterns. Today, patient is accompanied by her  along with son and daughter. Son and daughter suspect patient is not taking medications, especially those to treat dementia. Dates on medication bottles confirm this. Son states they would like to know what actions can they take now to maintain patient's well-being and safety. They want a confirmation of dementia diagnosis. Given today's MoCA score of 9/30, it is definite indication patient has moderate-to-severe dementia.         What matters most: Plan of care for patient and family going forward.       Visual: glasses No Last exam: 2023  Hearing: hearing No Last exam: NA  Dental: dentures No Last exam: 2024    Sleep: how many hours at night unknown, sleep aides: None    Is dependant or requires assistance in the following BADL: None, but reports weakness of bilateral legs. PT evaluation?  Is dependant or requires assistance in the following Instrumental ADL: Grocery shopping, preparing meals, laundry        Review of Systems   Unable to perform ROS: Dementia     Objective   /76   Pulse 80   Wt 54.4 kg (120 lb)   SpO2 97%   BMI 20.60 kg/m²     Current Outpatient Medications on File Prior to Visit   Medication Sig Dispense Refill    atenoloL-chlorthalidone (Tenoretic) 100-25 mg tablet Take 1 tablet by mouth once daily. 90 tablet 3    BABY ASPIRIN ORAL Take 1 tablet by mouth once  daily.      blood sugar diagnostic (OneTouch Ultra Test) strip TEST ONCE DAILY 200 strip 3    cholecalciferol (Vitamin D-3) 50 mcg (2,000 unit) capsule Take 1 capsule (50 mcg) by mouth early in the morning..      coenzyme Q-10 100 mg capsule Take 1 capsule (100 mg) by mouth once daily.      donepezil (Aricept) 10 mg tablet Take 1 tablet (10 mg) by mouth once daily at bedtime. 30 tablet 11    metFORMIN (Glucophage) 500 mg tablet Take 1.5 tablets (750 mg) by mouth 2 times a day. 270 tablet 0    multivitamin tablet Take 1 tablet by mouth once daily.      OneTouch Delica Plus Lancet 30 gauge misc       OneTouch Ultra2 Meter misc Apply 1 kit topically once daily. 1 each 0    pioglitazone (Actos) 15 mg tablet Take 1 tablet (15 mg) by mouth once daily. 90 tablet 3    atorvastatin (Lipitor) 40 mg tablet Take 1 tablet (40 mg) by mouth once daily. 90 tablet 0     No current facility-administered medications on file prior to visit.     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)    Poor appetite    Taking multiple medications for chronic disease    Encounter for vitamin deficiency screening    Screening for hyperlipidemia    Weight loss, unintentional     Past Medical History:   Diagnosis Date    Essential (primary) hypertension 12/29/2022    HTN (hypertension), benign    Insect bite (nonvenomous) of scalp, initial encounter (CODE) 06/05/2017    Tick bite of scalp, initial encounter    Listeriosis, unspecified 05/23/2016    Listeria infection    Other conditions influencing health status 01/29/2018    Normal colonoscopy    Pyelonephritis of left kidney 04/11/2023    Type 2 diabetes mellitus with other specified complication (Multi)  12/29/2022    DM type 2 with diabetic mixed hyperlipidemia    Unilateral primary osteoarthritis, right hip 12/09/2019    Primary osteoarthritis of right hip    Vitamin D deficiency, unspecified     Vitamin D deficiency     Past Surgical History:   Procedure Laterality Date    OTHER SURGICAL HISTORY  09/30/2019    Tonsillectomy with adenoidectomy     The 10-year ASCVD risk score (Blanca STOREY, et al., 2019) is: 50%    Values used to calculate the score:      Age: 79 years      Sex: Female      Is Non- : No      Diabetic: Yes      Tobacco smoker: No      Systolic Blood Pressure: 140 mmHg      Is BP treated: Yes      HDL Cholesterol: 61.4 mg/dL      Total Cholesterol: 151 mg/dL      MOST RECENT LABS from 2022 (labs ordered 2 wks ago & 6 months ago)  Recent Results (from the past 58618 hour(s))   Comprehensive Metabolic Panel    Collection Time: 12/29/22 10:37 AM   Result Value Ref Range    Glucose 136 (H) 74 - 99 mg/dL    Sodium 139 136 - 145 mmol/L    Potassium 3.5 3.5 - 5.3 mmol/L    Chloride 98 98 - 107 mmol/L    Bicarbonate 31 21 - 32 mmol/L    Anion Gap 14 10 - 20 mmol/L    Urea Nitrogen 27 (H) 6 - 23 mg/dL    Creatinine 0.86 0.50 - 1.05 mg/dL    GFR Female 70 >90 mL/min/1.73m2    Calcium 10.0 8.6 - 10.6 mg/dL    Albumin 4.7 3.4 - 5.0 g/dL    Alkaline Phosphatase 76 33 - 136 U/L    Total Protein 7.9 6.4 - 8.2 g/dL    AST 35 9 - 39 U/L    Total Bilirubin 0.5 0.0 - 1.2 mg/dL    ALT (SGPT) 27 7 - 45 U/L   Hemoglobin A1C    Collection Time: 12/29/22 10:37 AM   Result Value Ref Range    Hemoglobin A1C 7.3 (A) %    Estimated Average Glucose 163 MG/DL   Urinalysis with Reflex Microscopic    Collection Time: 12/29/22 10:37 AM   Result Value Ref Range    Color, Urine YELLOW STRAW,YELLOW    Appearance, Urine CLEAR CLEAR    Specific Gravity, Urine 1.018 1.005 - 1.035    pH, Urine 5.0 5.0 - 8.0    Protein, Urine NEGATIVE NEGATIVE mg/dL    Glucose, Urine >=500 (3+) (A) NEGATIVE mg/dL    Blood, Urine  NEGATIVE NEGATIVE    Ketones, Urine NEGATIVE NEGATIVE mg/dL    Bilirubin, Urine NEGATIVE NEGATIVE    Urobilinogen, Urine <2.0 0.0 - 1.9 mg/dL    Nitrite, Urine NEGATIVE NEGATIVE    Leukocyte Esterase, Urine LARGE (3+) (A) NEGATIVE   Lipid Panel    Collection Time: 12/29/22 10:37 AM   Result Value Ref Range    Cholesterol 134 0 - 199 mg/dL    HDL 58.7 mg/dL    Cholesterol/HDL Ratio 2.3     LDL 39 0 - 99 mg/dL    VLDL 36 0 - 40 mg/dL    Triglycerides 182 (H) 0 - 149 mg/dL   Urinalysis Microscopic    Collection Time: 12/29/22 10:37 AM   Result Value Ref Range    WBC, Urine 21 (A) 0 - 5 /HPF    RBC, Urine None 0 - 5 /HPF    Squamous Epithelial Cells, Urine <1 /HPF    Mucus, Urine 2+ /LPF    Hyaline Casts, Urine 1+ (A) /LPF   TSH with reflex to Free T4 if abnormal    Collection Time: 03/29/24  4:00 PM   Result Value Ref Range    Thyroid Stimulating Hormone 1.12 0.44 - 3.98 mIU/L   CBC and Auto Differential    Collection Time: 03/29/24  4:00 PM   Result Value Ref Range    WBC 7.4 4.4 - 11.3 x10*3/uL    nRBC 0.0 0.0 - 0.0 /100 WBCs    RBC 4.15 4.00 - 5.20 x10*6/uL    Hemoglobin 13.4 12.0 - 16.0 g/dL    Hematocrit 39.1 36.0 - 46.0 %    MCV 94 80 - 100 fL    MCH 32.3 26.0 - 34.0 pg    MCHC 34.3 32.0 - 36.0 g/dL    RDW 12.6 11.5 - 14.5 %    Platelets 203 150 - 450 x10*3/uL    Neutrophils % 59.6 40.0 - 80.0 %    Immature Granulocytes %, Automated 0.1 0.0 - 0.9 %    Lymphocytes % 31.9 13.0 - 44.0 %    Monocytes % 6.6 2.0 - 10.0 %    Eosinophils % 1.5 0.0 - 6.0 %    Basophils % 0.3 0.0 - 2.0 %    Neutrophils Absolute 4.44 1.60 - 5.50 x10*3/uL    Immature Granulocytes Absolute, Automated 0.01 0.00 - 0.50 x10*3/uL    Lymphocytes Absolute 2.37 0.80 - 3.00 x10*3/uL    Monocytes Absolute 0.49 0.05 - 0.80 x10*3/uL    Eosinophils Absolute 0.11 0.00 - 0.40 x10*3/uL    Basophils Absolute 0.02 0.00 - 0.10 x10*3/uL   Comprehensive Metabolic Panel    Collection Time: 03/29/24  4:00 PM   Result Value Ref Range    Glucose 119 (H) 74 - 99  mg/dL    Sodium 141 136 - 145 mmol/L    Potassium 3.7 3.5 - 5.3 mmol/L    Chloride 100 98 - 107 mmol/L    Bicarbonate 28 21 - 32 mmol/L    Anion Gap 17 10 - 20 mmol/L    Urea Nitrogen 21 6 - 23 mg/dL    Creatinine 0.88 0.50 - 1.05 mg/dL    eGFR 68 >60 mL/min/1.73m*2    Calcium 10.3 8.6 - 10.6 mg/dL    Albumin 4.5 3.4 - 5.0 g/dL    Alkaline Phosphatase 82 33 - 136 U/L    Total Protein 7.7 6.4 - 8.2 g/dL    AST 28 9 - 39 U/L    Bilirubin, Total 0.7 0.0 - 1.2 mg/dL    ALT 23 7 - 45 U/L   Magnesium    Collection Time: 03/29/24  4:00 PM   Result Value Ref Range    Magnesium 1.46 (L) 1.60 - 2.40 mg/dL   Vitamin D 25-Hydroxy,Total (for eval of Vitamin D levels)    Collection Time: 03/29/24  4:00 PM   Result Value Ref Range    Vitamin D, 25-Hydroxy, Total 36 30 - 100 ng/mL   Hemoglobin A1C    Collection Time: 03/29/24  4:00 PM   Result Value Ref Range    Hemoglobin A1C 8.0 (H) see below %    Estimated Average Glucose 183 Not Established mg/dL   Lipid Panel    Collection Time: 03/29/24  4:00 PM   Result Value Ref Range    Cholesterol 151 0 - 199 mg/dL    HDL-Cholesterol 61.4 mg/dL    Cholesterol/HDL Ratio 2.5     LDL Calculated 44 <=99 mg/dL    VLDL 46 (H) 0 - 40 mg/dL    Triglycerides 228 (H) 0 - 149 mg/dL    Non HDL Cholesterol 90 0 - 149 mg/dL        Physical Exam  Vitals reviewed.   Constitutional:       General: She is awake.      Appearance: Normal appearance. She is well-groomed and normal weight.      Comments: Pt w/ normal weight, but family reports continued weight loss. Reportedly often eats 1-2 meals a day.   HENT:      Head: Normocephalic and atraumatic.      Jaw: There is normal jaw occlusion.      Right Ear: Hearing, tympanic membrane, ear canal and external ear normal.      Left Ear: Hearing, tympanic membrane, ear canal and external ear normal.      Nose: Rhinorrhea present. Rhinorrhea is clear.      Mouth/Throat:      Lips: Pink.      Mouth: Mucous membranes are moist.      Dentition: Abnormal dentition.       Pharynx: Oropharynx is clear. Uvula midline.      Comments: Missing molars back right side. Following up with dentist.  Eyes:      General: Lids are normal.      Conjunctiva/sclera: Conjunctivae normal.      Pupils: Pupils are equal, round, and reactive to light.   Cardiovascular:      Rate and Rhythm: Normal rate and regular rhythm.      Pulses:           Carotid pulses are 2+ on the right side and 2+ on the left side.       Radial pulses are 2+ on the right side and 2+ on the left side.        Dorsalis pedis pulses are 1+ on the right side and 1+ on the left side.      Heart sounds: Normal heart sounds.   Pulmonary:      Effort: Pulmonary effort is normal.      Breath sounds: Normal breath sounds and air entry.   Abdominal:      General: Abdomen is flat. Bowel sounds are normal.      Palpations: Abdomen is soft.   Musculoskeletal:      Cervical back: Normal range of motion and neck supple.   Feet:      Right foot:      Skin integrity: Skin integrity normal.      Left foot:      Skin integrity: Skin integrity normal.      Toenail Condition: Left toenails are abnormally thick. Fungal disease present.     Comments: Left great toe with abnormally thick toenail with fungal disease  Skin:     General: Skin is warm and dry.   Neurological:      Mental Status: She is alert.      Comments: Oriented to self   Psychiatric:         Attention and Perception: She is inattentive.         Mood and Affect: Mood is anxious.         Speech: Speech normal.         Behavior: Behavior is agitated. Behavior is cooperative.         Cognition and Memory: Memory is impaired.         Judgment: Judgment is impulsive.           MoCA:  9/30  GDS:    4/15    Home Safety Concerns: Transitions from cement to gravel, shuffling gait, railing is up. Lives on more than one level    Assessment/Plan      Problem List Items Addressed This Visit             ICD-10-CM    Encounter for geriatric assessment Z01.89    Relevant Orders    Follow up in  Geriatrics - Cognitive Testing    Follow Up In Geriatrics    Poor appetite R63.0    Relevant Medications    mirtazapine (Remeron) 15 mg tablet    Other Relevant Orders    Follow up in Geriatrics - Cognitive Testing    Follow Up In Geriatrics    Weight loss, unintentional - Primary R63.4       Elsa Ball, APRN-CNP

## 2024-06-03 PROBLEM — R63.4 WEIGHT LOSS, UNINTENTIONAL: Status: ACTIVE | Noted: 2024-06-03

## 2024-08-02 DIAGNOSIS — R63.0 POOR APPETITE: ICD-10-CM

## 2024-08-02 DIAGNOSIS — E78.2 DM TYPE 2 WITH DIABETIC MIXED HYPERLIPIDEMIA (MULTI): ICD-10-CM

## 2024-08-02 DIAGNOSIS — E11.69 DM TYPE 2 WITH DIABETIC MIXED HYPERLIPIDEMIA (MULTI): ICD-10-CM

## 2024-08-02 RX ORDER — MIRTAZAPINE 15 MG/1
TABLET, FILM COATED ORAL
Qty: 90 TABLET | Refills: 3 | Status: SHIPPED | OUTPATIENT
Start: 2024-08-02

## 2024-08-02 RX ORDER — METFORMIN HYDROCHLORIDE 500 MG/1
750 TABLET ORAL 2 TIMES DAILY
Qty: 270 TABLET | Refills: 3 | Status: SHIPPED | OUTPATIENT
Start: 2024-08-02

## 2024-08-08 ENCOUNTER — APPOINTMENT (OUTPATIENT)
Dept: GERIATRIC MEDICINE | Facility: CLINIC | Age: 79
End: 2024-08-08
Payer: MEDICARE

## 2024-08-08 ENCOUNTER — OFFICE VISIT (OUTPATIENT)
Dept: GERIATRIC MEDICINE | Facility: CLINIC | Age: 79
End: 2024-08-08
Payer: MEDICARE

## 2024-08-08 VITALS
SYSTOLIC BLOOD PRESSURE: 127 MMHG | DIASTOLIC BLOOD PRESSURE: 79 MMHG | HEART RATE: 78 BPM | BODY MASS INDEX: 20.36 KG/M2 | WEIGHT: 118.6 LBS | TEMPERATURE: 95.6 F

## 2024-08-08 DIAGNOSIS — R41.89 COGNITIVE IMPAIRMENT: ICD-10-CM

## 2024-08-08 DIAGNOSIS — R63.4 WEIGHT LOSS, UNINTENTIONAL: Primary | ICD-10-CM

## 2024-08-08 PROCEDURE — 1159F MED LIST DOCD IN RCRD: CPT | Performed by: NURSE PRACTITIONER

## 2024-08-08 PROCEDURE — 1036F TOBACCO NON-USER: CPT | Performed by: NURSE PRACTITIONER

## 2024-08-08 PROCEDURE — 1126F AMNT PAIN NOTED NONE PRSNT: CPT | Performed by: NURSE PRACTITIONER

## 2024-08-08 PROCEDURE — 3074F SYST BP LT 130 MM HG: CPT | Performed by: NURSE PRACTITIONER

## 2024-08-08 PROCEDURE — 3078F DIAST BP <80 MM HG: CPT | Performed by: NURSE PRACTITIONER

## 2024-08-08 PROCEDURE — 99215 OFFICE O/P EST HI 40 MIN: CPT | Performed by: NURSE PRACTITIONER

## 2024-08-08 RX ORDER — MIRTAZAPINE 15 MG/1
15 TABLET, FILM COATED ORAL NIGHTLY
Qty: 30 TABLET | Refills: 2 | Status: SHIPPED | OUTPATIENT
Start: 2024-08-08 | End: 2024-11-06

## 2024-08-08 RX ORDER — MIRTAZAPINE 30 MG/1
30 TABLET, FILM COATED ORAL NIGHTLY
Qty: 30 TABLET | Refills: 2 | Status: SHIPPED | OUTPATIENT
Start: 2024-08-08 | End: 2024-08-08 | Stop reason: DRUGHIGH

## 2024-08-08 ASSESSMENT — PATIENT HEALTH QUESTIONNAIRE - PHQ9
1. LITTLE INTEREST OR PLEASURE IN DOING THINGS: NOT AT ALL
2. FEELING DOWN, DEPRESSED OR HOPELESS: NOT AT ALL
SUM OF ALL RESPONSES TO PHQ9 QUESTIONS 1 AND 2: 0

## 2024-08-08 ASSESSMENT — MONTREAL COGNITIVE ASSESSMENT (MOCA)
13. ORIENTATION SUBSCORE: 3
4. NAME EACH OF THE THREE ANIMALS SHOWN: 2
10. [FLUENCY] NAME WORDS STARTING WITH DESIGNATED LETTER: 0
11. FOR EACH PAIR OF WORDS, WHAT CATEGORY DO THEY BELONG TO (OUT OF 2): 0
5. MEMORY TRIALS: 0
VISUOSPATIAL/EXECUTIVE SUBSCORE: 1
8. SERIAL SUBTRACTION OF 7S: 0
7. [VIGILENCE] TAP WHEN HEARING DESIGNATED LETTER: 1
9. REPEAT EACH SENTENCE: 0
WHAT LEVEL OF EDUCATION WAS ATTAINED: 1
12. MEMORY INDEX SCORE: 0
WHAT IS THE TOTAL SCORE (OUT OF 30): 10
6. READ LIST OF DIGITS [FORWARD/BACKWARD]: 2

## 2024-08-08 ASSESSMENT — ENCOUNTER SYMPTOMS
LOSS OF SENSATION IN FEET: 0
OCCASIONAL FEELINGS OF UNSTEADINESS: 0

## 2024-08-08 ASSESSMENT — PAIN SCALES - GENERAL: PAINLEVEL: 0-NO PAIN

## 2024-08-08 NOTE — PROGRESS NOTES
Subjective   Patient ID: Inge Clark is a 79 y.o. female who presents for Follow-up.    Inge Clark is a pleasant 79 y.o.  female who presents today for geriatric medicine follow-up and MoCA. She is accompanied by her son and . Patient and family deny any acute concerns/issues.      Current Outpatient Medications on File Prior to Visit   Medication Sig Dispense Refill    atenoloL-chlorthalidone (Tenoretic) 100-25 mg tablet Take 1 tablet by mouth once daily. 90 tablet 3    atorvastatin (Lipitor) 40 mg tablet Take 1 tablet (40 mg) by mouth once daily. 90 tablet 0    BABY ASPIRIN ORAL Take 1 tablet by mouth once daily.      blood sugar diagnostic (OneTouch Ultra Test) strip TEST ONCE DAILY 200 strip 3    cholecalciferol (Vitamin D-3) 50 mcg (2,000 unit) capsule Take 1 capsule (50 mcg) by mouth early in the morning..      coenzyme Q-10 100 mg capsule Take 1 capsule (100 mg) by mouth once daily.      donepezil (Aricept) 10 mg tablet Take 1 tablet (10 mg) by mouth once daily at bedtime. 30 tablet 11    metFORMIN (Glucophage) 500 mg tablet Take 1.5 tablets (750 mg) by mouth 2 times a day. 270 tablet 3    multivitamin tablet Take 1 tablet by mouth once daily.      OneTouch Delica Plus Lancet 30 gauge misc       OneTouch Ultra2 Meter misc Apply 1 kit topically once daily. 1 each 0    pioglitazone (Actos) 15 mg tablet Take 1 tablet (15 mg) by mouth once daily. 90 tablet 3     No current facility-administered medications on file prior to visit.         Review of Systems   Unable to perform ROS: Dementia       Objective   Visit Vitals  /79   Pulse 78   Temp 35.3 °C (95.6 °F) (Tympanic)   Wt 53.8 kg (118 lb 9.6 oz)   BMI 20.36 kg/m²   OB Status Postmenopausal   Smoking Status Never   BSA 1.56 m²       Physical Exam  Vitals (2 lb wt loss since last visit, 10 lb wt loss since March.) reviewed.   Constitutional:       Appearance: Normal appearance.   HENT:      Head: Normocephalic.   Eyes:       Conjunctiva/sclera: Conjunctivae normal.   Cardiovascular:      Rate and Rhythm: Normal rate.      Pulses: Normal pulses.      Comments: Bounding apical pulse  Pulmonary:      Effort: Pulmonary effort is normal.   Abdominal:      General: Abdomen is flat. Bowel sounds are normal.   Musculoskeletal:      Cervical back: Normal range of motion.   Neurological:      Mental Status: She is alert. Mental status is at baseline.      GCS: GCS eye subscore is 4. GCS verbal subscore is 5. GCS motor subscore is 6.   Psychiatric:         Attention and Perception: Attention normal.         Mood and Affect: Mood normal.         Speech: Speech normal.         Behavior: Behavior is cooperative.         Thought Content: Thought content is delusional.         Cognition and Memory: Memory is impaired. She exhibits impaired recent memory and impaired remote memory.         Judgment: Judgment is inappropriate.         Assessment/Plan   Problem List Items Addressed This Visit             ICD-10-CM    Impaired cognition R41.89     Continue with donepezil 10 mg at bedtime  Increase socialization:  consider going to [facility] during the day; adult day care  Increase physical exercise:  try walking 30 minutes 5 times per week  Increase mental stimulation:  brain stimulating activities- crossword puzzles, sudoku, word searches, read books, craft, learn a new hobby, take a class  Mediterranean diet- fruits, vegetables, lean meats, and fish, omega 3 fatty fish          Weight loss, unintentional - Primary R63.4     Protein supplement - isosource unflavored powdered that can be added to regular meals. Also, try protein shakes like Muscle Milk, FairLife, Premier.         Relevant Medications    mirtazapine (Remeron) 15 mg tablet    Other Relevant Orders    Follow Up In Geriatrics       Time Spent  Prep time on day of patient encounter: 0 minutes  Time spent directly with patient, family or caregiver: 32 minutes  Additional Time Spent  on Patient Care Activities: 0 minutes  Documentation Time: 15 minutes  Other Time Spent: 0 minutes  Total: 47 minutes        NISHI Baker 08/15/24 11:57 PM

## 2024-08-08 NOTE — PATIENT INSTRUCTIONS
Protein supplement - isosource unflavored powdered that can be added to regular meals. Also, try protein shakes like Muscle Milk, FairLife, Premier.    A. Increase socialization:  - consider going to [facility] during the day; adult day care  B. Increase physical exercise:  - try walking 30 minutes 5 times per week  C. Increase mental stimulation:  - brain stimulating activities- crossword puzzles, sudoku, word searches, read books, craft, learn a new hobby, take a class  D. Mediterranean diet- fruits, vegetables, lean meats, and fish, omega 3 fatty fish

## 2024-08-15 PROBLEM — R29.6 RECURRENT FALLS: Status: ACTIVE | Noted: 2024-08-15

## 2024-08-15 PROBLEM — M16.11 OSTEOARTHRITIS OF RIGHT HIP: Status: ACTIVE | Noted: 2024-08-15

## 2024-08-15 PROBLEM — E87.6 HYPOKALEMIA: Status: ACTIVE | Noted: 2024-08-15

## 2024-08-15 PROBLEM — S01.05XA: Status: ACTIVE | Noted: 2024-08-15

## 2024-08-15 PROBLEM — E83.42 HYPOMAGNESEMIA: Status: ACTIVE | Noted: 2024-08-15

## 2024-08-15 PROBLEM — R11.2 NAUSEA AND VOMITING: Status: ACTIVE | Noted: 2024-08-15

## 2024-08-15 PROBLEM — A32.9: Status: ACTIVE | Noted: 2024-08-15

## 2024-08-15 PROBLEM — B02.9 HERPES ZOSTER WITHOUT COMPLICATION: Status: ACTIVE | Noted: 2024-08-15

## 2024-08-15 PROBLEM — S00.83XA TRAUMATIC HEMATOMA OF FOREHEAD: Status: ACTIVE | Noted: 2024-08-15

## 2024-08-15 PROBLEM — R79.89 ELEVATED TROPONIN I LEVEL: Status: RESOLVED | Noted: 2024-08-15 | Resolved: 2024-08-15

## 2024-08-15 PROBLEM — L98.9 SKIN LESION: Status: ACTIVE | Noted: 2024-08-15

## 2024-08-15 PROBLEM — R42 DIZZINESS: Status: ACTIVE | Noted: 2024-08-15

## 2024-08-15 PROBLEM — R26.81 UNSTEADINESS ON FEET: Status: ACTIVE | Noted: 2018-08-02

## 2024-08-15 PROBLEM — G54.2 DISORDER OF RIGHT CERVICAL NERVE ROOT: Status: ACTIVE | Noted: 2024-08-15

## 2024-08-15 PROBLEM — M65.30 TRIGGER FINGER: Status: ACTIVE | Noted: 2024-08-15

## 2024-08-15 PROBLEM — M79.606 PAIN OF LOWER EXTREMITY: Status: ACTIVE | Noted: 2024-08-15

## 2024-08-16 NOTE — ASSESSMENT & PLAN NOTE
Protein supplement - isosource unflavored powdered that can be added to regular meals. Also, try protein shakes like Muscle Milk, FairLife, Premier.

## 2024-08-16 NOTE — ASSESSMENT & PLAN NOTE
Continue with donepezil 10 mg at bedtime  Increase socialization:  [facility]; [facility]  Increase physical exercise:  try walking 30 minutes 5 times per week  Increase mental stimulation:  brain stimulating activities- crossword puzzles, sudoku, word searches, read books, craft, learn a new hobby, take a class  Mediterranean diet- fruits, vegetables, lean meats, and fish, omega 3 fatty fish

## 2024-08-21 ENCOUNTER — TELEPHONE (OUTPATIENT)
Dept: PRIMARY CARE | Facility: CLINIC | Age: 79
End: 2024-08-21
Payer: MEDICARE

## 2024-08-21 DIAGNOSIS — F01.C3 SEVERE VASCULAR DEMENTIA WITH MOOD DISTURBANCE (MULTI): ICD-10-CM

## 2024-08-21 RX ORDER — DONEPEZIL HYDROCHLORIDE 10 MG/1
10 TABLET, FILM COATED ORAL NIGHTLY
Qty: 30 TABLET | Refills: 11 | Status: SHIPPED | OUTPATIENT
Start: 2024-08-21 | End: 2025-08-21

## 2024-09-17 ENCOUNTER — APPOINTMENT (OUTPATIENT)
Dept: PRIMARY CARE | Facility: CLINIC | Age: 79
End: 2024-09-17
Payer: MEDICARE

## 2024-09-18 ENCOUNTER — TELEPHONE (OUTPATIENT)
Dept: PRIMARY CARE | Facility: CLINIC | Age: 79
End: 2024-09-18
Payer: MEDICARE

## 2024-09-18 NOTE — TELEPHONE ENCOUNTER
She had an accident yesterday, not able to come in &  said he could not call.    Asking to get her in for back pain & a rash

## 2024-09-19 ENCOUNTER — OFFICE VISIT (OUTPATIENT)
Dept: PRIMARY CARE | Facility: CLINIC | Age: 79
End: 2024-09-19
Payer: MEDICARE

## 2024-09-19 VITALS
SYSTOLIC BLOOD PRESSURE: 124 MMHG | RESPIRATION RATE: 16 BRPM | OXYGEN SATURATION: 97 % | DIASTOLIC BLOOD PRESSURE: 60 MMHG | HEART RATE: 88 BPM | HEIGHT: 64 IN | BODY MASS INDEX: 22.2 KG/M2 | WEIGHT: 130 LBS

## 2024-09-19 DIAGNOSIS — R21 RASH: Primary | ICD-10-CM

## 2024-09-19 PROCEDURE — 1159F MED LIST DOCD IN RCRD: CPT

## 2024-09-19 PROCEDURE — 1036F TOBACCO NON-USER: CPT

## 2024-09-19 PROCEDURE — 3078F DIAST BP <80 MM HG: CPT

## 2024-09-19 PROCEDURE — 1125F AMNT PAIN NOTED PAIN PRSNT: CPT

## 2024-09-19 PROCEDURE — 99213 OFFICE O/P EST LOW 20 MIN: CPT

## 2024-09-19 PROCEDURE — 1160F RVW MEDS BY RX/DR IN RCRD: CPT

## 2024-09-19 PROCEDURE — 3074F SYST BP LT 130 MM HG: CPT

## 2024-09-19 ASSESSMENT — PAIN SCALES - GENERAL: PAINLEVEL: 2

## 2024-09-19 ASSESSMENT — ENCOUNTER SYMPTOMS
CONSTITUTIONAL NEGATIVE: 1
CARDIOVASCULAR NEGATIVE: 1
RESPIRATORY NEGATIVE: 1

## 2024-09-19 NOTE — PROGRESS NOTES
"Subjective   Patient ID: Inge Clark is a 79 y.o. female who presents for Rash (Shoulder red patches x1 week).    A 79-year-old female accompanied with her  arrives to clinic with chief complaint of rash.  The patient reports a rash on her shoulder and bra line over the last week.  The  reports that the rash has disappeared today.  There is no open lesions or wounds at this time.    Review of Systems   Constitutional: Negative.    Respiratory: Negative.     Cardiovascular: Negative.    Skin:  Positive for rash.       Objective   /60 (BP Location: Right arm, Patient Position: Sitting, BP Cuff Size: Adult)   Pulse 88   Resp 16   Ht 1.626 m (5' 4\")   Wt 59 kg (130 lb)   SpO2 97%   BMI 22.31 kg/m²     Physical Exam    No rash seen today.    Assessment/Plan   Problem List Items Addressed This Visit    None  Visit Diagnoses       Rash    -  Primary          Given the subjective and reported data given by the patient's  as the patient has dementia, I do believe that her symptoms are likely contact dermatitis from her bra line.  I did notice throughout the examination that her  would point at the purported rash areas with his fingernail causing scratch marks and was given education to not do that as that can further worsen her skin integrity.  We do recommend over-the-counter Lotrimin cream or change in bra size to help decrease the irritation from her close.  Both the patient and mother both the patient and  agrees to the plan of care.  In addition, this would happen again, take a picture and uploaded through her [employer] so we can further assess the issue.    As a result of the work-up, the patient was discharged home.  she was informed of her diagnosis and instructed to come back with any concerns or worsening of condition.  she and was agreeable to the plan as discussed above.  she was given the opportunity to ask questions.  All of the patient's questions were " answered.    This document was generated using the assistance of voice recognition software. If there are any errors of spelling, grammar, syntax, or meaning; please feel free to contact me directly for clarification.

## 2024-09-23 ENCOUNTER — TELEPHONE (OUTPATIENT)
Dept: PRIMARY CARE | Facility: CLINIC | Age: 79
End: 2024-09-23
Payer: MEDICARE

## 2024-09-23 DIAGNOSIS — E78.2 DM TYPE 2 WITH DIABETIC MIXED HYPERLIPIDEMIA (MULTI): ICD-10-CM

## 2024-09-23 DIAGNOSIS — E11.69 DM TYPE 2 WITH DIABETIC MIXED HYPERLIPIDEMIA (MULTI): ICD-10-CM

## 2024-09-23 RX ORDER — ATORVASTATIN CALCIUM 40 MG/1
40 TABLET, FILM COATED ORAL DAILY
Qty: 90 TABLET | Refills: 3 | Status: SHIPPED | OUTPATIENT
Start: 2024-09-23 | End: 2025-09-18

## 2024-09-23 NOTE — TELEPHONE ENCOUNTER
Needs a refill on her Atorvastatin 40 mg takes it 1 time a day please send to [facility] in [place] saw [facility] on 9/19/24

## 2024-10-10 ENCOUNTER — CLINICAL SUPPORT (OUTPATIENT)
Dept: PRIMARY CARE | Facility: CLINIC | Age: 79
End: 2024-10-10
Payer: MEDICARE

## 2024-10-10 DIAGNOSIS — Z23 NEED FOR INFLUENZA VACCINATION: ICD-10-CM

## 2024-10-10 PROCEDURE — G0008 ADMIN INFLUENZA VIRUS VAC: HCPCS | Performed by: CLINICAL NURSE SPECIALIST

## 2024-10-10 PROCEDURE — 90662 IIV NO PRSV INCREASED AG IM: CPT | Performed by: CLINICAL NURSE SPECIALIST

## 2024-10-11 ENCOUNTER — APPOINTMENT (OUTPATIENT)
Dept: GERIATRIC MEDICINE | Facility: CLINIC | Age: 79
End: 2024-10-11
Payer: MEDICARE

## 2024-10-11 ENCOUNTER — OFFICE VISIT (OUTPATIENT)
Dept: GERIATRIC MEDICINE | Facility: CLINIC | Age: 79
End: 2024-10-11
Payer: MEDICARE

## 2024-10-11 VITALS
HEART RATE: 71 BPM | DIASTOLIC BLOOD PRESSURE: 70 MMHG | BODY MASS INDEX: 22.35 KG/M2 | RESPIRATION RATE: 16 BRPM | WEIGHT: 130.2 LBS | SYSTOLIC BLOOD PRESSURE: 145 MMHG | TEMPERATURE: 95.7 F

## 2024-10-11 DIAGNOSIS — E11.69 MIXED DIABETIC HYPERLIPIDEMIA ASSOCIATED WITH TYPE 2 DIABETES MELLITUS (MULTI): ICD-10-CM

## 2024-10-11 DIAGNOSIS — E78.2 DM TYPE 2 WITH DIABETIC MIXED HYPERLIPIDEMIA (MULTI): ICD-10-CM

## 2024-10-11 DIAGNOSIS — R41.89 IMPAIRED COGNITION: Primary | ICD-10-CM

## 2024-10-11 DIAGNOSIS — F01.C3 SEVERE VASCULAR DEMENTIA WITH MOOD DISTURBANCE: ICD-10-CM

## 2024-10-11 DIAGNOSIS — E11.69 DM TYPE 2 WITH DIABETIC MIXED HYPERLIPIDEMIA (MULTI): ICD-10-CM

## 2024-10-11 DIAGNOSIS — R63.4 WEIGHT LOSS, UNINTENTIONAL: ICD-10-CM

## 2024-10-11 DIAGNOSIS — E78.2 MIXED DIABETIC HYPERLIPIDEMIA ASSOCIATED WITH TYPE 2 DIABETES MELLITUS (MULTI): ICD-10-CM

## 2024-10-11 DIAGNOSIS — R29.6 RECURRENT FALLS: ICD-10-CM

## 2024-10-11 PROCEDURE — 99417 PROLNG OP E/M EACH 15 MIN: CPT | Performed by: NURSE PRACTITIONER

## 2024-10-11 PROCEDURE — 82962 GLUCOSE BLOOD TEST: CPT | Performed by: NURSE PRACTITIONER

## 2024-10-11 PROCEDURE — 99215 OFFICE O/P EST HI 40 MIN: CPT | Performed by: NURSE PRACTITIONER

## 2024-10-11 ASSESSMENT — ENCOUNTER SYMPTOMS
OCCASIONAL FEELINGS OF UNSTEADINESS: 0
LOSS OF SENSATION IN FEET: 0

## 2024-10-11 ASSESSMENT — PAIN SCALES - GENERAL: PAINLEVEL: 0-NO PAIN

## 2024-10-11 NOTE — PROGRESS NOTES
Subjective   Patient ID: Inge Clark is a 79 y.o. female who presents for Follow-up.    Inge Clark is a pleasant 79 y.o.  female who presents today for geriatric medicine follow-up and MoCA. She is accompanied by her son and . Both  and son report patient with increased appetite. Patient without a significant weight gain today, has maintained weight. Son reports patient will not check her blood sugar. They have received 2 separate glucometers and patient seems to have hidden them somewhere. Patient does not deny she has hidden the devices. Will only smile and say she may know where they are. Patient also stated she does not like to stick her fingers to check BS. Son requesting for patient's BS to be checked because it has been months since it was last checked. Continues on mirtazapine 15 mg q nightly, metformin 750 mg BID, donepezil 10 mg nightly.      Current Outpatient Medications on File Prior to Visit   Medication Sig Dispense Refill    atenoloL-chlorthalidone (Tenoretic) 100-25 mg tablet Take 1 tablet by mouth once daily. 90 tablet 3    atorvastatin (Lipitor) 40 mg tablet Take 1 tablet (40 mg) by mouth once daily. 90 tablet 3    blood sugar diagnostic (OneTouch Ultra Test) strip TEST ONCE DAILY 200 strip 3    cholecalciferol (Vitamin D-3) 50 mcg (2,000 unit) capsule Take 1 capsule (50 mcg) by mouth early in the morning..      coenzyme Q-10 100 mg capsule Take 1 capsule (100 mg) by mouth once daily.      donepezil (Aricept) 10 mg tablet Take 1 tablet (10 mg) by mouth once daily at bedtime. 30 tablet 11    metFORMIN (Glucophage) 500 mg tablet Take 1.5 tablets (750 mg) by mouth 2 times a day. 270 tablet 3    mirtazapine (Remeron) 15 mg tablet Take 1 tablet (15 mg) by mouth once daily at bedtime. 30 tablet 2    multivitamin tablet Take 1 tablet by mouth once daily.      OneTouch Delica Plus Lancet 30 gauge [facility]       OneTouch Ultra2 Meter misc Apply 1 kit topically once daily. 1  each 0    pioglitazone (Actos) 15 mg tablet Take 1 tablet (15 mg) by mouth once daily. 90 tablet 3    BABY ASPIRIN ORAL Take 1 tablet by mouth once daily.       No current facility-administered medications on file prior to visit.     No Known Allergies    Past Medical History:   Diagnosis Date    Elevated troponin I level 08/15/2024    Essential (primary) hypertension 12/29/2022    HTN (hypertension), benign    Insect bite (nonvenomous) of scalp, initial encounter (CODE) 06/05/2017    Tick bite of scalp, initial encounter    Listeriosis, unspecified 05/23/2016    Listeria infection    Other conditions influencing health status 01/29/2018    Normal colonoscopy    Pyelonephritis of left kidney 04/11/2023    Type 2 diabetes mellitus with other specified complication 12/29/2022    DM type 2 with diabetic mixed hyperlipidemia    Unilateral primary osteoarthritis, right hip 12/09/2019    Primary osteoarthritis of right hip    Vitamin D deficiency, unspecified     Vitamin D deficiency     Past Surgical History:   Procedure Laterality Date    OTHER SURGICAL HISTORY  09/30/2019    Tonsillectomy with adenoidectomy     Social Drivers of Health     Tobacco Use: Low Risk  (10/11/2024)    Patient History     Smoking Tobacco Use: Never     Smokeless Tobacco Use: Never     Passive Exposure: Not on file   Alcohol Use: Not on file   Financial Resource Strain: Not on file   Food Insecurity: Not on file   Transportation Needs: Not on file   Physical Activity: Not on file   Stress: Not on file   Social Connections: Not on file   Intimate Partner Violence: Not on file   Depression: Not at risk (10/11/2024)    PHQ-2     PHQ-2 Score: 0   Housing Stability: Not on file   Utilities: Not on file   Digital Equity: Not on file   Health Literacy: Not on file        Review of Systems   Unable to perform ROS: Dementia       Objective   Visit Vitals  /70   Pulse 71   Temp 35.4 °C (95.7 °F) (Tympanic)   Resp 16   Wt 59.1 kg (130 lb 3.2 oz)    BMI 22.35 kg/m²   OB Status Postmenopausal   Smoking Status Never   BSA 1.63 m²       Physical Exam  Vitals (2 lb wt loss since last visit, 10 lb wt loss since March.) reviewed.   Constitutional:       Appearance: Normal appearance.   HENT:      Head: Normocephalic.   Eyes:      Conjunctiva/sclera: Conjunctivae normal.   Cardiovascular:      Rate and Rhythm: Normal rate.      Pulses: Normal pulses.      Comments: Bounding apical pulse  Pulmonary:      Effort: Pulmonary effort is normal.   Abdominal:      General: Abdomen is flat. Bowel sounds are normal.   Musculoskeletal:      Cervical back: Normal range of motion.   Neurological:      Mental Status: She is alert. Mental status is at baseline.      GCS: GCS eye subscore is 4. GCS verbal subscore is 5. GCS motor subscore is 6.   Psychiatric:         Attention and Perception: Attention normal.         Mood and Affect: Mood normal.         Speech: Speech normal.         Behavior: Behavior is cooperative.         Thought Content: Thought content is delusional.         Cognition and Memory: Memory is impaired. She exhibits impaired recent memory and impaired remote memory.         Judgment: Judgment is inappropriate.         Assessment/Plan   Inge Clark is a pleasant 79 y.o.  female who presents today for geriatric medicine follow-up and MoCA. She is accompanied by her son and . Both  and son report patient with increased appetite. Patient without a significant weight gain today, has maintained weight. Son reports patient will not check her blood sugar. They have received 2 separate glucometers and patient seems to have hidden them somewhere. Patient does not deny she has hidden the devices. Will only smile and say she may know where they are. Patient also stated she does not like to stick her fingers to check BS. Son requesting for patient's BS to be checked because it has been months since it was last checked. Continues on mirtazapine 15  mg q nightly, metformin 750 mg BID, donepezil 10 mg nightly.  Problem List Items Addressed This Visit             ICD-10-CM    Mixed diabetic hyperlipidemia associated with type 2 diabetes mellitus (Multi) E11.69, E78.2     Please check blood sugar once a week. Make glucometer available to family so they can check it for you.  Today your blood sugar was 164.         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 However, the hope is mirtazapine with offset this.  Continue with donepezil 10 mg nightly         Relevant Orders    Follow Up In Geriatrics    Weight loss, unintentional R63.4    Relevant Orders    Follow Up In Geriatrics    Recurrent falls R29.6    Relevant Orders    Follow Up In Geriatrics       Time Spent  Prep time on day of patient encounter: 0 minutes  Time spent directly with patient, family or caregiver: 35 minutes  Additional Time Spent on Patient Care Activities: 0 minutes  Documentation Time: 30 minutes  Other Time Spent: 0 minutes  Total: 65 minutes          Elsa Ball, APRN-CNP

## 2024-10-11 NOTE — PATIENT INSTRUCTIONS
"Neurology       Patient Care Team:  Selvin Masters Mai.      Mary Farfan MD  04/17/20  12:24    " Follow-up in 3 months    Please check blood sugar once a week. Make glucometer available to family so they can check it for you.    Today your blood sugar was 164.

## 2024-10-19 LAB — POC FINGERSTICK BLOOD GLUCOSE: 164 MG/DL (ref 70–100)

## 2024-10-19 NOTE — ASSESSMENT & PLAN NOTE
Please check blood sugar once a week. Make glucometer available to family so they can check it for you.  Today your blood sugar was 164.

## 2024-10-19 NOTE — ASSESSMENT & PLAN NOTE
Continue with mirtazapine 15 mg once at bedtime  Encourage 4-6 small meals a day  Nutritional dietary protein-supplement 3 times a day with a meal. Or, protein shakes such as Fairlife, Premier, and the like. Also, can add unflavored protein powder to regular meals to increase protein intake.

## 2024-10-19 NOTE — ASSESSMENT & PLAN NOTE
For now, family chooses to continue donepezil with understanding it can affect patient's appetite. However, the hope is mirtazapine with offset this.  Continue with donepezil 10 mg nightly

## 2024-11-22 ENCOUNTER — HOSPITAL ENCOUNTER (EMERGENCY)
Facility: HOSPITAL | Age: 79
Discharge: HOME | End: 2024-11-22
Payer: MEDICARE

## 2024-11-22 ENCOUNTER — TELEPHONE (OUTPATIENT)
Dept: PRIMARY CARE | Facility: CLINIC | Age: 79
End: 2024-11-22
Payer: MEDICARE

## 2024-11-22 ENCOUNTER — PHARMACY VISIT (OUTPATIENT)
Dept: PHARMACY | Facility: CLINIC | Age: 79
End: 2024-11-22
Payer: COMMERCIAL

## 2024-11-22 VITALS
BODY MASS INDEX: 23.92 KG/M2 | WEIGHT: 130 LBS | OXYGEN SATURATION: 97 % | HEART RATE: 86 BPM | SYSTOLIC BLOOD PRESSURE: 139 MMHG | TEMPERATURE: 98.2 F | HEIGHT: 62 IN | DIASTOLIC BLOOD PRESSURE: 78 MMHG | RESPIRATION RATE: 16 BRPM

## 2024-11-22 DIAGNOSIS — R21 RASH: Primary | ICD-10-CM

## 2024-11-22 PROCEDURE — 99282 EMERGENCY DEPT VISIT SF MDM: CPT

## 2024-11-22 PROCEDURE — RXMED WILLOW AMBULATORY MEDICATION CHARGE

## 2024-11-22 RX ORDER — HYDROCORTISONE 1 %
1 CREAM (GRAM) TOPICAL 2 TIMES DAILY
Qty: 28 G | Refills: 0 | Status: SHIPPED | OUTPATIENT
Start: 2024-11-22 | End: 2024-12-22

## 2024-11-22 ASSESSMENT — COLUMBIA-SUICIDE SEVERITY RATING SCALE - C-SSRS
6. HAVE YOU EVER DONE ANYTHING, STARTED TO DO ANYTHING, OR PREPARED TO DO ANYTHING TO END YOUR LIFE?: NO
2. HAVE YOU ACTUALLY HAD ANY THOUGHTS OF KILLING YOURSELF?: NO
1. IN THE PAST MONTH, HAVE YOU WISHED YOU WERE DEAD OR WISHED YOU COULD GO TO SLEEP AND NOT WAKE UP?: NO

## 2024-11-22 ASSESSMENT — LIFESTYLE VARIABLES
HAVE YOU EVER FELT YOU SHOULD CUT DOWN ON YOUR DRINKING: NO
EVER HAD A DRINK FIRST THING IN THE MORNING TO STEADY YOUR NERVES TO GET RID OF A HANGOVER: NO
TOTAL SCORE: 0
HAVE PEOPLE ANNOYED YOU BY CRITICIZING YOUR DRINKING: NO
EVER FELT BAD OR GUILTY ABOUT YOUR DRINKING: NO

## 2024-11-22 ASSESSMENT — PAIN - FUNCTIONAL ASSESSMENT: PAIN_FUNCTIONAL_ASSESSMENT: 0-10

## 2024-11-22 ASSESSMENT — PAIN SCALES - GENERAL: PAINLEVEL_OUTOF10: 0 - NO PAIN

## 2024-11-22 NOTE — TELEPHONE ENCOUNTER
Patients  called concerning a rash under his wife's breast that is becoming worse and has been  using a salve under it and not helping, He is going to take her to ER due to the severity of the rash

## 2024-11-22 NOTE — ED PROVIDER NOTES
Chief Complaint   Patient presents with    Rash       HPI       79 year old female presents to the Emergency Department today complaining of a rash to bilateral breasts over the past several months. Denies any changes in laundry detergents, soaps, lotions, or any other environmental changes. No new or changes in medications. Denies any associated fever, chills, headache, neck pain, chest pain, shortness of breath, abdominal pain, nausea, vomiting, diarrhea, constipation, or urinary symptoms.       History provided by:  Patient             Patient History   Past Medical History:   Diagnosis Date    Elevated troponin I level 08/15/2024    Essential (primary) hypertension 12/29/2022    HTN (hypertension), benign    Insect bite (nonvenomous) of scalp, initial encounter (CODE) 06/05/2017    Tick bite of scalp, initial encounter    Listeriosis, unspecified 05/23/2016    Listeria infection    Other conditions influencing health status 01/29/2018    Normal colonoscopy    Pyelonephritis of left kidney 04/11/2023    Type 2 diabetes mellitus with other specified complication 12/29/2022    DM type 2 with diabetic mixed hyperlipidemia    Unilateral primary osteoarthritis, right hip 12/09/2019    Primary osteoarthritis of right hip    Vitamin D deficiency, unspecified     Vitamin D deficiency     Past Surgical History:   Procedure Laterality Date    OTHER SURGICAL HISTORY  09/30/2019    Tonsillectomy with adenoidectomy     Family History   Problem Relation Name Age of Onset    Stroke Mother      Heart attack Mother      Hypertension Father       Social History     Tobacco Use    Smoking status: Never    Smokeless tobacco: Never   Vaping Use    Vaping status: Never Used   Substance Use Topics    Alcohol use: Yes     Alcohol/week: 1.0 standard drink of alcohol     Types: 1 Standard drinks or equivalent per week     Comment: Socially    Drug use: Never           Physical Exam  Constitutional:       General: She is awake.       Appearance: Normal appearance.   Cardiovascular:      Rate and Rhythm: Normal rate and regular rhythm.      Pulses:           Radial pulses are 3+ on the right side and 3+ on the left side.      Heart sounds: Normal heart sounds. No murmur heard.     No friction rub. No gallop.   Pulmonary:      Effort: Pulmonary effort is normal.      Breath sounds: Normal breath sounds and air entry.   Skin:     Comments: Mild areas of excoriation to bilateral breasts without signs of secondary infection.    Neurological:      Mental Status: She is alert.   Psychiatric:         Behavior: Behavior is cooperative.         Labs Reviewed - No data to display    No orders to display            ED Course & MDM   Diagnoses as of 11/22/24 1722   Rash           Medical Decision Making  Patient was seen and evaluated by myself. I am uncertain of the direct etiology of her rash. There are no signs of shingles, fungal infection, or cellulitis. Will treat with Hydrocortisone cream. Follow up with their doctor in 1 week. Return if worse in any way. Discharged in stable condition with computer instructions.    Diagnostic Impression:     1. Acute nonspecific rash    2. Prescription therapy            Your medication list        CONTINUE taking these medications        Instructions Last Dose Given Next Dose Due   OneTouch Delica Plus Lancet 30 gauge misc  Generic drug: lancets           OneTouch Ultra2 Meter misc  Generic drug: blood-glucose meter      Apply 1 kit topically once daily.              ASK your doctor about these medications        Instructions Last Dose Given Next Dose Due   atenoloL-chlorthalidone 100-25 mg tablet  Commonly known as: Tenoretic      Take 1 tablet by mouth once daily.       atorvastatin 40 mg tablet  Commonly known as: Lipitor      Take 1 tablet (40 mg) by mouth once daily.       BABY ASPIRIN ORAL           cholecalciferol 50 mcg (2,000 unit) capsule  Commonly known as: Vitamin D-3           coenzyme Q-10 100 mg  capsule           donepezil 10 mg tablet  Commonly known as: Aricept      Take 1 tablet (10 mg) by mouth once daily at bedtime.       metFORMIN 500 mg tablet  Commonly known as: Glucophage      Take 1.5 tablets (750 mg) by mouth 2 times a day.       mirtazapine 15 mg tablet  Commonly known as: Remeron      Take 1 tablet (15 mg) by mouth once daily at bedtime.       multivitamin tablet           OneTouch Ultra Test strip  Generic drug: blood sugar diagnostic      TEST ONCE DAILY       pioglitazone 15 mg tablet  Commonly known as: Actos      Take 1 tablet (15 mg) by mouth once daily.                  Procedure  Procedures     Chucho Wolff, PRAVIN-CNP  11/22/24 [id number]

## 2024-11-25 ENCOUNTER — TELEPHONE (OUTPATIENT)
Dept: PRIMARY CARE | Facility: CLINIC | Age: 79
End: 2024-11-25
Payer: MEDICARE

## 2024-11-25 NOTE — TELEPHONE ENCOUNTER
Patients  called and they were told to follow up with a pcp on 11/29 after a patient got a rash and was given a creme no opening until 12/30 please advise     Can't do the 12/2

## 2024-12-03 ENCOUNTER — APPOINTMENT (OUTPATIENT)
Dept: PRIMARY CARE | Facility: CLINIC | Age: 79
End: 2024-12-03
Payer: MEDICARE

## 2025-01-17 ENCOUNTER — APPOINTMENT (OUTPATIENT)
Dept: GERIATRIC MEDICINE | Facility: CLINIC | Age: 80
End: 2025-01-17
Payer: MEDICARE

## 2025-01-27 ENCOUNTER — APPOINTMENT (OUTPATIENT)
Dept: PRIMARY CARE | Facility: CLINIC | Age: 80
End: 2025-01-27
Payer: MEDICARE

## 2025-01-27 VITALS
DIASTOLIC BLOOD PRESSURE: 70 MMHG | HEIGHT: 62 IN | SYSTOLIC BLOOD PRESSURE: 112 MMHG | WEIGHT: 134 LBS | HEART RATE: 80 BPM | BODY MASS INDEX: 24.66 KG/M2

## 2025-01-27 DIAGNOSIS — E11.65 TYPE 2 DIABETES MELLITUS WITH HYPERGLYCEMIA, WITHOUT LONG-TERM CURRENT USE OF INSULIN: ICD-10-CM

## 2025-01-27 DIAGNOSIS — E78.5 HYPERLIPIDEMIA ASSOCIATED WITH TYPE 2 DIABETES MELLITUS (MULTI): ICD-10-CM

## 2025-01-27 DIAGNOSIS — B35.9 TINEA: ICD-10-CM

## 2025-01-27 DIAGNOSIS — Z00.00 MEDICARE ANNUAL WELLNESS VISIT, SUBSEQUENT: Primary | ICD-10-CM

## 2025-01-27 DIAGNOSIS — E11.69 HYPERLIPIDEMIA ASSOCIATED WITH TYPE 2 DIABETES MELLITUS (MULTI): ICD-10-CM

## 2025-01-27 PROCEDURE — G0444 DEPRESSION SCREEN ANNUAL: HCPCS | Performed by: CLINICAL NURSE SPECIALIST

## 2025-01-27 PROCEDURE — 3078F DIAST BP <80 MM HG: CPT | Performed by: CLINICAL NURSE SPECIALIST

## 2025-01-27 PROCEDURE — 99214 OFFICE O/P EST MOD 30 MIN: CPT | Performed by: CLINICAL NURSE SPECIALIST

## 2025-01-27 PROCEDURE — G0439 PPPS, SUBSEQ VISIT: HCPCS | Performed by: CLINICAL NURSE SPECIALIST

## 2025-01-27 PROCEDURE — 3074F SYST BP LT 130 MM HG: CPT | Performed by: CLINICAL NURSE SPECIALIST

## 2025-01-27 PROCEDURE — 1160F RVW MEDS BY RX/DR IN RCRD: CPT | Performed by: CLINICAL NURSE SPECIALIST

## 2025-01-27 PROCEDURE — 1159F MED LIST DOCD IN RCRD: CPT | Performed by: CLINICAL NURSE SPECIALIST

## 2025-01-27 PROCEDURE — 1124F ACP DISCUSS-NO DSCNMKR DOCD: CPT | Performed by: CLINICAL NURSE SPECIALIST

## 2025-01-27 PROCEDURE — 1036F TOBACCO NON-USER: CPT | Performed by: CLINICAL NURSE SPECIALIST

## 2025-01-27 PROCEDURE — 1170F FXNL STATUS ASSESSED: CPT | Performed by: CLINICAL NURSE SPECIALIST

## 2025-01-27 RX ORDER — NYSTATIN 100000 U/G
CREAM TOPICAL 2 TIMES DAILY
Qty: 30 G | Refills: 0 | Status: SHIPPED | OUTPATIENT
Start: 2025-01-27 | End: 2025-02-03

## 2025-01-27 RX ORDER — POLYETHYLENE GLYCOL 3350 17 G/17G
17 POWDER, FOR SOLUTION ORAL DAILY
COMMUNITY

## 2025-01-27 ASSESSMENT — ENCOUNTER SYMPTOMS
ARTHRALGIAS: 0
SORE THROAT: 0
EYE PAIN: 0
POLYDIPSIA: 0
WOUND: 0
VOMITING: 0
BLOOD IN STOOL: 0
DIARRHEA: 0
BACK PAIN: 0
CHEST TIGHTNESS: 0
OCCASIONAL FEELINGS OF UNSTEADINESS: 0
NECK PAIN: 0
CONSTIPATION: 0
HEMATURIA: 0
WHEEZING: 0
PALPITATIONS: 0
SHORTNESS OF BREATH: 0
ACTIVITY CHANGE: 0
DYSURIA: 0
TROUBLE SWALLOWING: 0
SEIZURES: 0
SLEEP DISTURBANCE: 0
ABDOMINAL PAIN: 0
NAUSEA: 0
PHOTOPHOBIA: 0
COUGH: 0
LOSS OF SENSATION IN FEET: 0
UNEXPECTED WEIGHT CHANGE: 0
CONFUSION: 1
DIZZINESS: 0
APPETITE CHANGE: 0
FEVER: 0
MYALGIAS: 0
FATIGUE: 0
JOINT SWELLING: 0
FLANK PAIN: 0
DEPRESSION: 0
CHILLS: 0
HEADACHES: 0
BRUISES/BLEEDS EASILY: 0

## 2025-01-27 ASSESSMENT — COLUMBIA-SUICIDE SEVERITY RATING SCALE - C-SSRS
1. IN THE PAST MONTH, HAVE YOU WISHED YOU WERE DEAD OR WISHED YOU COULD GO TO SLEEP AND NOT WAKE UP?: NO
2. HAVE YOU ACTUALLY HAD ANY THOUGHTS OF KILLING YOURSELF?: NO
6. HAVE YOU EVER DONE ANYTHING, STARTED TO DO ANYTHING, OR PREPARED TO DO ANYTHING TO END YOUR LIFE?: NO

## 2025-01-27 ASSESSMENT — ACTIVITIES OF DAILY LIVING (ADL)
DRESSING: INDEPENDENT
BATHING: INDEPENDENT
DOING_HOUSEWORK: INDEPENDENT
TAKING_MEDICATION: INDEPENDENT
MANAGING_FINANCES: INDEPENDENT
GROCERY_SHOPPING: INDEPENDENT

## 2025-01-27 NOTE — PROGRESS NOTES
Subjective   Patient ID: Inge Clark is a 79 y.o. female who presents for Medicare Annual Wellness Visit Subsequent (Medicare wellness /Rash under both breast and abdominal pain ).  HPI    Here today as a follow up appointment. Due for Medicare Wellness. Presents today with her . Ambulating independently without assistive device. Lives with her  at home. States that she takes her medications as prescribed. Denies any recent illnesses. Denies any complaints of pain. Denies any chest pain or shortness of breath. Adult children live close by and check in with them.      Due for updated lab work. States that she did not have done yet.     Does have an appointment with Gerontology in February, plan to continue Donepezil.     Has been having some increased Constipation, has not tried anything OTC.     Rash under bilateral breasts.        Review of Systems   Constitutional:  Negative for activity change, appetite change, chills, fatigue, fever and unexpected weight change.   HENT:  Negative for ear pain, hearing loss, nosebleeds, sore throat, tinnitus and trouble swallowing.    Eyes:  Negative for photophobia, pain and visual disturbance.   Respiratory:  Negative for cough, chest tightness, shortness of breath and wheezing.    Cardiovascular:  Negative for chest pain, palpitations and leg swelling.   Gastrointestinal:  Negative for abdominal pain, blood in stool, constipation, diarrhea, nausea and vomiting.   Endocrine: Negative for cold intolerance, heat intolerance, polydipsia and polyuria.   Genitourinary:  Negative for dysuria, flank pain and hematuria.   Musculoskeletal:  Negative for arthralgias, back pain, joint swelling, myalgias and neck pain.   Skin:  Positive for rash. Negative for pallor and wound.   Allergic/Immunologic: Negative for immunocompromised state.   Neurological:  Negative for dizziness, seizures and headaches.   Hematological:  Does not bruise/bleed easily.    Psychiatric/Behavioral:  Positive for confusion. Negative for sleep disturbance.        Objective   Physical Exam  Vitals and nursing note reviewed.   Constitutional:       General: She is not in acute distress.     Appearance: Normal appearance.   HENT:      Head: Normocephalic.      Nose: Nose normal.   Eyes:      Conjunctiva/sclera: Conjunctivae normal.   Neck:      Vascular: No carotid bruit.   Cardiovascular:      Rate and Rhythm: Normal rate and regular rhythm.      Pulses: Normal pulses.      Heart sounds: Normal heart sounds.   Pulmonary:      Effort: Pulmonary effort is normal.      Breath sounds: Normal breath sounds.   Abdominal:      General: Bowel sounds are normal.      Palpations: Abdomen is soft.   Musculoskeletal:         General: Normal range of motion.      Cervical back: Normal range of motion.   Skin:     General: Skin is warm and dry.      Findings: Rash present.   Neurological:      Mental Status: She is alert. Mental status is at baseline.   Psychiatric:         Mood and Affect: Mood normal.         Behavior: Behavior normal.         Assessment/Plan        New order for blood work provided at [facility] today. Will follow up pending results.      Diabetes: Most recent A1C 7.3%. Continue Metformin and Actos. Encourage updated Eye exam. Albumin ordered.  Does not monitor her blood sugars at home, declined monitoring.   Hyperlipidemia: Continue Atorvastatin. Updated lab work ordered.   Hypertension: Blood pressure controlled at [facility] today. Continue to monitor.   Vitamin D Deficiency: Updated lab work ordered.   Cognitive Impairment: Continue Aricept as prescribed. Safe at home per . Following with Geriatrics.   Medicare Wellness: Routine and age appropriate recommendations discussed with the patient today and patient verbalized understanding of the recommendations.  Questions answered.  Age appropriate immunizations and preventative screenings discussed with the patient and ordered as  appropriate. Labs updated and ordered as indicated. Recommend healthy diet and daily exercise to maintain healthy body weight. 5 minutes  were spent screening for depression using PHQ2/PHQ9 as documented in the chart.   Constipation: Miralax recommended. Increase water/fiber intake.   Tinea: Topical as prescribed.     Bone Density: July 2022, normal. Declined.   Medicare Wellness: January 2025.   COVID Vaccine: November 2021.   Prevnar 20: July 2023.   Flu Vaccine: October 2024.   Tdap: July 2012.     Maryann Marinelli, APRN-CNS 01/27/25 10:42 AM

## 2025-01-29 DIAGNOSIS — E11.69 DM TYPE 2 WITH DIABETIC MIXED HYPERLIPIDEMIA (MULTI): ICD-10-CM

## 2025-01-29 DIAGNOSIS — E78.2 DM TYPE 2 WITH DIABETIC MIXED HYPERLIPIDEMIA (MULTI): ICD-10-CM

## 2025-01-29 DIAGNOSIS — E87.6 HYPOKALEMIA: Primary | ICD-10-CM

## 2025-01-29 LAB
ALBUMIN SERPL-MCNC: 4.3 G/DL (ref 3.6–5.1)
ALP SERPL-CCNC: 85 U/L (ref 37–153)
ALT SERPL-CCNC: 16 U/L (ref 6–29)
ANION GAP SERPL CALCULATED.4IONS-SCNC: 11 MMOL/L (CALC) (ref 7–17)
AST SERPL-CCNC: 22 U/L (ref 10–35)
BILIRUB SERPL-MCNC: 0.6 MG/DL (ref 0.2–1.2)
BUN SERPL-MCNC: 21 MG/DL (ref 7–25)
CALCIUM SERPL-MCNC: 9.2 MG/DL (ref 8.6–10.4)
CHLORIDE SERPL-SCNC: 97 MMOL/L (ref 98–110)
CHOLEST SERPL-MCNC: 142 MG/DL
CHOLEST/HDLC SERPL: 2.8 (CALC)
CO2 SERPL-SCNC: 30 MMOL/L (ref 20–32)
CREAT SERPL-MCNC: 0.98 MG/DL (ref 0.6–1)
EGFRCR SERPLBLD CKD-EPI 2021: 59 ML/MIN/1.73M2
ERYTHROCYTE [DISTWIDTH] IN BLOOD BY AUTOMATED COUNT: 13.2 % (ref 11–15)
EST. AVERAGE GLUCOSE BLD GHB EST-MCNC: 174 MG/DL
EST. AVERAGE GLUCOSE BLD GHB EST-SCNC: 9.7 MMOL/L
GLUCOSE SERPL-MCNC: 148 MG/DL (ref 65–99)
HBA1C MFR BLD: 7.7 % OF TOTAL HGB
HCT VFR BLD AUTO: 39.4 % (ref 35–45)
HCV AB SERPL QL IA: NORMAL
HDLC SERPL-MCNC: 51 MG/DL
HGB BLD-MCNC: 12.8 G/DL (ref 11.7–15.5)
LDLC SERPL CALC-MCNC: 63 MG/DL (CALC)
MCH RBC QN AUTO: 29.6 PG (ref 27–33)
MCHC RBC AUTO-ENTMCNC: 32.5 G/DL (ref 32–36)
MCV RBC AUTO: 91.2 FL (ref 80–100)
NONHDLC SERPL-MCNC: 91 MG/DL (CALC)
PLATELET # BLD AUTO: 114 THOUSAND/UL (ref 140–400)
PMV BLD REES-ECKER: 12.7 FL (ref 7.5–12.5)
POTASSIUM SERPL-SCNC: 3.3 MMOL/L (ref 3.5–5.3)
PROT SERPL-MCNC: 7.7 G/DL (ref 6.1–8.1)
RBC # BLD AUTO: 4.32 MILLION/UL (ref 3.8–5.1)
SODIUM SERPL-SCNC: 138 MMOL/L (ref 135–146)
TRIGL SERPL-MCNC: 224 MG/DL
TSH SERPL-ACNC: 1.24 MIU/L (ref 0.4–4.5)
VIT B12 SERPL-MCNC: 340 PG/ML (ref 200–1100)
WBC # BLD AUTO: 5.9 THOUSAND/UL (ref 3.8–10.8)

## 2025-01-29 RX ORDER — METFORMIN HYDROCHLORIDE 1000 MG/1
1000 TABLET ORAL
Qty: 180 TABLET | Refills: 3 | Status: SHIPPED | OUTPATIENT
Start: 2025-01-29 | End: 2026-01-29

## 2025-01-29 RX ORDER — POTASSIUM CHLORIDE 20 MEQ/1
20 TABLET, EXTENDED RELEASE ORAL DAILY
Qty: 30 TABLET | Refills: 11 | Status: SHIPPED | OUTPATIENT
Start: 2025-01-29 | End: 2026-01-29

## 2025-01-30 ENCOUNTER — TELEPHONE (OUTPATIENT)
Dept: PRIMARY CARE | Facility: CLINIC | Age: 80
End: 2025-01-30
Payer: MEDICARE

## 2025-01-30 DIAGNOSIS — I10 HTN (HYPERTENSION), BENIGN: ICD-10-CM

## 2025-01-30 DIAGNOSIS — E11.65 TYPE 2 DIABETES MELLITUS WITH HYPERGLYCEMIA, WITHOUT LONG-TERM CURRENT USE OF INSULIN: ICD-10-CM

## 2025-01-30 DIAGNOSIS — E11.69 HYPERLIPIDEMIA ASSOCIATED WITH TYPE 2 DIABETES MELLITUS (MULTI): ICD-10-CM

## 2025-01-30 DIAGNOSIS — E78.5 HYPERLIPIDEMIA ASSOCIATED WITH TYPE 2 DIABETES MELLITUS (MULTI): ICD-10-CM

## 2025-01-30 DIAGNOSIS — E55.9 VITAMIN D DEFICIENCY: ICD-10-CM

## 2025-01-30 NOTE — TELEPHONE ENCOUNTER
----- Message from Maryann Marinelli sent at 1/29/2025  6:24 PM EST -----  Please call patient and  with lab results. She may not be able to get the result timely via Portal. A1C is elevated at 7.7%. Would like to increase her Metformin to 1000 mg twice a day. Potassium is low, E-Prescribed. Platelets have decreased, will monitor. Will need to repeat lab work prior to follow up appointment. Please reorder lab work. Thank you!

## 2025-02-10 DIAGNOSIS — R63.4 WEIGHT LOSS, UNINTENTIONAL: ICD-10-CM

## 2025-02-10 PROBLEM — Z96.641 PRESENCE OF RIGHT ARTIFICIAL HIP JOINT: Status: ACTIVE | Noted: 2019-11-08

## 2025-02-10 PROBLEM — N12 PYELONEPHRITIS: Status: ACTIVE | Noted: 2023-04-11

## 2025-02-10 RX ORDER — MIRTAZAPINE 15 MG/1
15 TABLET, FILM COATED ORAL NIGHTLY
Qty: 30 TABLET | Refills: 2 | Status: SHIPPED | OUTPATIENT
Start: 2025-02-10 | End: 2025-05-11

## 2025-02-20 ENCOUNTER — OFFICE VISIT (OUTPATIENT)
Dept: GERIATRIC MEDICINE | Facility: CLINIC | Age: 80
End: 2025-02-20
Payer: MEDICARE

## 2025-02-20 VITALS
DIASTOLIC BLOOD PRESSURE: 80 MMHG | BODY MASS INDEX: 24.33 KG/M2 | SYSTOLIC BLOOD PRESSURE: 144 MMHG | HEART RATE: 70 BPM | WEIGHT: 133 LBS

## 2025-02-20 DIAGNOSIS — F01.C3 SEVERE VASCULAR DEMENTIA WITH MOOD DISTURBANCE: Primary | ICD-10-CM

## 2025-02-20 DIAGNOSIS — E78.2 DM TYPE 2 WITH DIABETIC MIXED HYPERLIPIDEMIA (MULTI): ICD-10-CM

## 2025-02-20 DIAGNOSIS — R63.4 WEIGHT LOSS, UNINTENTIONAL: ICD-10-CM

## 2025-02-20 DIAGNOSIS — E11.69 DM TYPE 2 WITH DIABETIC MIXED HYPERLIPIDEMIA (MULTI): ICD-10-CM

## 2025-02-20 DIAGNOSIS — R41.89 IMPAIRED COGNITION: ICD-10-CM

## 2025-02-20 DIAGNOSIS — R29.6 RECURRENT FALLS: ICD-10-CM

## 2025-02-20 PROCEDURE — 99215 OFFICE O/P EST HI 40 MIN: CPT | Performed by: NURSE PRACTITIONER

## 2025-02-20 PROCEDURE — 1123F ACP DISCUSS/DSCN MKR DOCD: CPT | Performed by: NURSE PRACTITIONER

## 2025-02-20 PROCEDURE — 1126F AMNT PAIN NOTED NONE PRSNT: CPT | Performed by: NURSE PRACTITIONER

## 2025-02-20 PROCEDURE — 3077F SYST BP >= 140 MM HG: CPT | Performed by: NURSE PRACTITIONER

## 2025-02-20 PROCEDURE — 3079F DIAST BP 80-89 MM HG: CPT | Performed by: NURSE PRACTITIONER

## 2025-02-20 RX ORDER — FLASH GLUCOSE SENSOR
KIT MISCELLANEOUS
Qty: 1 EACH | Refills: 0 | Status: SHIPPED | OUTPATIENT
Start: 2025-02-20 | End: 2025-02-21

## 2025-02-20 ASSESSMENT — ENCOUNTER SYMPTOMS
OCCASIONAL FEELINGS OF UNSTEADINESS: 0
DEPRESSION: 0
LOSS OF SENSATION IN FEET: 0

## 2025-02-20 ASSESSMENT — PAIN SCALES - GENERAL: PAINLEVEL_OUTOF10: 0-NO PAIN

## 2025-02-20 NOTE — PROGRESS NOTES
Subjective   Patient ID: Inge Clark is a 79 y.o. female who presents for Follow-up.  Inge Clark is a/an 79 y.o. female who presents today for a geriatric medicine and primary care follow-up. Their last visit was 10/11/2024. Today, patient presents for: Follow-up. Today, they are accompanied by her , Deacon, who is their primary historian. Their main concern is patient tolerating donepezil and mirtazapine; patient's unwillingness to check blood sugars.  states he is unable to understand the different types of glucometers. Requesting FreeStyle Elsie or similar device. Patient agreeable to try.      Current Outpatient Medications on File Prior to Visit   Medication Sig Dispense Refill    atenoloL-chlorthalidone (Tenoretic) 100-25 mg tablet Take 1 tablet by mouth once daily. 90 tablet 3    atorvastatin (Lipitor) 40 mg tablet Take 1 tablet (40 mg) by mouth once daily. 90 tablet 3    BABY ASPIRIN ORAL Take 1 tablet by mouth once daily.      blood sugar diagnostic (OneTouch Ultra Test) strip TEST ONCE DAILY 200 strip 3    cholecalciferol (Vitamin D-3) 50 mcg (2,000 unit) capsule Take 1 capsule (50 mcg) by mouth early in the morning..      coenzyme Q-10 100 mg capsule Take 1 capsule (100 mg) by mouth once daily.      donepezil (Aricept) 10 mg tablet Take 1 tablet (10 mg) by mouth once daily at bedtime. 30 tablet 11    metFORMIN (Glucophage) 1,000 mg tablet Take 1 tablet (1,000 mg) by mouth 2 times daily (morning and late afternoon). 180 tablet 3    mirtazapine (Remeron) 15 mg tablet Take 1 tablet (15 mg) by mouth once daily at bedtime. 30 tablet 2    multivitamin tablet Take 1 tablet by mouth once daily.      OneTouch Delica Plus Lancet 30 gauge misc       OneTouch Ultra2 Meter misc Apply 1 kit topically once daily. 1 each 0    pioglitazone (Actos) 15 mg tablet Take 1 tablet (15 mg) by mouth once daily. 90 tablet 3    polyethylene glycol (Glycolax, Miralax) 17 gram packet Take 17 g by mouth once  daily.      potassium chloride CR (Klor-Con M20) 20 mEq ER tablet Take 1 tablet (20 mEq) by mouth once daily. Do not crush or chew. 30 tablet 11     No current facility-administered medications on file prior to visit.     Allergies   Allergen Reactions    Grass Pollen Unknown     Patient Active Problem List   Diagnosis    Mixed diabetic hyperlipidemia associated with type 2 diabetes mellitus (Multi)    Benign hypertension    Hyperlipidemia    Cervical arthritis    Pyelonephritis    Osteoarthritis of both knees    Vitamin D deficiency    Impaired cognition    Encounter for geriatric assessment    Severe vascular dementia with mood disturbance    Poor appetite    Taking multiple medications for chronic disease    Encounter for vitamin deficiency screening    Medicare annual wellness visit, subsequent    Weight loss, unintentional    Multiple falls    Pain of lower extremity    Osteoarthritis of right hip    Traumatic hematoma of forehead    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    Hypomagnesemia    Hypokalemia    Nausea and vomiting    Herpes zoster without complication    Dizziness    Disorder of right cervical nerve root    Animal bite of scalp    Unsteadiness on feet    Trigger finger    Skin lesion    Presence of right artificial hip joint     Past Medical History:   Diagnosis Date    Elevated troponin I level 08/15/2024    Essential (primary) hypertension 12/29/2022    HTN (hypertension), benign    Insect bite (nonvenomous) of scalp, initial encounter (CODE) 06/05/2017    Tick bite of scalp, initial encounter    Listeriosis, unspecified 05/23/2016    Listeria infection    Other conditions influencing health status 01/29/2018    Normal colonoscopy    Pyelonephritis of left kidney 04/11/2023    Type 2 diabetes mellitus with other specified complication 12/29/2022    DM type 2 with diabetic mixed hyperlipidemia    Unilateral primary osteoarthritis, right hip 12/09/2019    Primary osteoarthritis of right hip    Vitamin D  deficiency, unspecified     Vitamin D deficiency     Past Surgical History:   Procedure Laterality Date    OTHER SURGICAL HISTORY  09/30/2019    Tonsillectomy with adenoidectomy     Family History   Problem Relation Name Age of Onset    Stroke Mother      Heart attack Mother      Hypertension Father       Social Drivers of Health     Tobacco Use: Low Risk  (2/20/2025)    Patient History     Smoking Tobacco Use: Never     Smokeless Tobacco Use: Never     Passive Exposure: Not on file   Alcohol Use: Not on file   Financial Resource Strain: Not on file   Food Insecurity: Not on file   Transportation Needs: Not on file   Physical Activity: Not on file   Stress: Not on file   Social Connections: Not on file   Intimate Partner Violence: Not on file   Depression: Not at risk (2/20/2025)    PHQ-2     PHQ-2 Score: 0   Housing Stability: Not on file   Utilities: Not on file   Digital Equity: Not on file   Health Literacy: Not on file     Review of Systems   Unable to perform ROS: Dementia       Objective   Visit Vitals  /80   Pulse 70   Wt 60.3 kg (133 lb)   BMI 24.33 kg/m²   OB Status Postmenopausal   Smoking Status Never   BSA 1.62 m²     Recent Results (from the past 6 weeks)   CBC    Collection Time: 01/28/25 12:00 AM   Result Value Ref Range    WHITE BLOOD CELL COUNT 5.9 3.8 - 10.8 Thousand/uL    RED BLOOD CELL COUNT 4.32 3.80 - 5.10 Million/uL    HEMOGLOBIN 12.8 11.7 - 15.5 g/dL    HEMATOCRIT 39.4 35.0 - 45.0 %    MCV 91.2 80.0 - 100.0 fL    MCH 29.6 27.0 - 33.0 pg    MCHC 32.5 32.0 - 36.0 g/dL    RDW 13.2 11.0 - 15.0 %    PLATELET COUNT 114 (L) 140 - 400 Thousand/uL    MPV 12.7 (H) 7.5 - 12.5 fL   Comprehensive Metabolic Panel    Collection Time: 01/28/25 12:00 AM   Result Value Ref Range    GLUCOSE 148 (H) 65 - 99 mg/dL    UREA NITROGEN (BUN) 21 7 - 25 mg/dL    CREATININE 0.98 0.60 - 1.00 mg/dL    EGFR 59 (L) > OR = 60 mL/min/1.73m2    SODIUM 138 135 - 146 mmol/L    POTASSIUM 3.3 (L) 3.5 - 5.3 mmol/L    CHLORIDE  97 (L) 98 - 110 mmol/L    CARBON DIOXIDE 30 20 - 32 mmol/L    ELECTROLYTE BALANCE 11 7 - 17 mmol/L (calc)    CALCIUM 9.2 8.6 - 10.4 mg/dL    PROTEIN, TOTAL 7.7 6.1 - 8.1 g/dL    ALBUMIN 4.3 3.6 - 5.1 g/dL    BILIRUBIN, TOTAL 0.6 0.2 - 1.2 mg/dL    ALKALINE PHOSPHATASE 85 37 - 153 U/L    AST 22 10 - 35 U/L    ALT 16 6 - 29 U/L   Hepatitis C Antibody    Collection Time: 01/28/25 12:00 AM   Result Value Ref Range    HEPATITIS C ANTIBODY NON-REACTIVE NON-REACTIVE   TSH with reflex to Free T4 if abnormal    Collection Time: 01/28/25 12:00 AM   Result Value Ref Range    TSH W/REFLEX TO FT4 1.24 0.40 - 4.50 mIU/L   Vitamin B12    Collection Time: 01/28/25 12:00 AM   Result Value Ref Range    VITAMIN B12 340 200 - 1,100 pg/mL   Lipid Panel    Collection Time: 01/28/25 12:00 AM   Result Value Ref Range    CHOLESTEROL, TOTAL 142 <200 mg/dL    HDL CHOLESTEROL 51 > OR = 50 mg/dL    TRIGLYCERIDES 224 (H) <150 mg/dL    LDL-CHOLESTEROL 63 mg/dL (calc)    CHOL/HDLC RATIO 2.8 <5.0 (calc)    NON HDL CHOLESTEROL 91 <130 mg/dL (calc)   Hemoglobin A1C    Collection Time: 01/28/25 12:00 AM   Result Value Ref Range    HEMOGLOBIN A1c 7.7 (H) <5.7 % of total Hgb    eAG (mg/dL) 174 mg/dL    eAG (mmol/L) 9.7 mmol/L       Physical Exam  Constitutional:       Appearance: Normal appearance.   HENT:      Head: Normocephalic.      Nose: Nose normal.   Eyes:      Conjunctiva/sclera: Conjunctivae normal.   Cardiovascular:      Rate and Rhythm: Normal rate. Rhythm irregularly irregular.   Abdominal:      General: Abdomen is flat.   Musculoskeletal:         General: Normal range of motion.      Cervical back: Normal range of motion.      Right lower leg: No edema.      Left lower leg: No edema.   Skin:     General: Skin is warm.   Neurological:      Mental Status: She is alert. Mental status is at baseline.   Psychiatric:         Attention and Perception: She is inattentive.         Mood and Affect: Mood is anxious.         Speech: Speech is  tangential.         Behavior: Behavior is cooperative.         Thought Content: Thought content is paranoid.         Cognition and Memory: Cognition is impaired. Memory is impaired.         Judgment: Judgment is impulsive and inappropriate.         Assessment/Plan   Problem List Items Addressed This Visit             ICD-10-CM    Impaired cognition R41.89    Relevant Orders    Follow Up In Geriatrics    Severe vascular dementia with mood disturbance - Primary F01.C3    Relevant Orders    Follow Up In Geriatrics    Weight loss, unintentional R63.4    Relevant Orders    Follow Up In Geriatrics     Other Visit Diagnoses         Codes    Recurrent falls     R29.6    Relevant Orders    Follow Up In Geriatrics    DM type 2 with diabetic mixed hyperlipidemia (Multi)     E11.69, E78.2    Relevant Medications    FreeStyle Elsie 14 Day Sensor kit    Other Relevant Orders    Follow Up In Geriatrics            Time Spent  Prep time on day of patient encounter: 0 minutes  Time spent directly with patient, family or caregiver: 29 minutes  Additional Time Spent on Patient Care Activities: 0 minutes  Documentation Time: 20 minutes  Other Time Spent: 0 minutes  Total: 49 minutes        NISHI Baker 02/21/25 4:03 PM

## 2025-02-21 RX ORDER — FLASH GLUCOSE SENSOR
KIT MISCELLANEOUS
Qty: 2 EACH | Refills: 0 | Status: SHIPPED | OUTPATIENT
Start: 2025-02-21

## 2025-02-24 ENCOUNTER — DOCUMENTATION (OUTPATIENT)
Dept: GERIATRIC MEDICINE | Facility: CLINIC | Age: 80
End: 2025-02-24
Payer: MEDICARE

## 2025-02-27 DIAGNOSIS — R41.89 IMPAIRED COGNITION: ICD-10-CM

## 2025-02-27 DIAGNOSIS — E11.69 MIXED DIABETIC HYPERLIPIDEMIA ASSOCIATED WITH TYPE 2 DIABETES MELLITUS (MULTI): ICD-10-CM

## 2025-02-27 DIAGNOSIS — E11.69 DM TYPE 2 WITH DIABETIC MIXED HYPERLIPIDEMIA (MULTI): Primary | ICD-10-CM

## 2025-02-27 DIAGNOSIS — E78.2 MIXED DIABETIC HYPERLIPIDEMIA ASSOCIATED WITH TYPE 2 DIABETES MELLITUS (MULTI): ICD-10-CM

## 2025-02-27 DIAGNOSIS — E78.2 DM TYPE 2 WITH DIABETIC MIXED HYPERLIPIDEMIA (MULTI): Primary | ICD-10-CM

## 2025-03-07 DIAGNOSIS — E78.2 DM TYPE 2 WITH DIABETIC MIXED HYPERLIPIDEMIA (MULTI): Primary | ICD-10-CM

## 2025-03-07 DIAGNOSIS — F01.C3 SEVERE VASCULAR DEMENTIA WITH MOOD DISTURBANCE: ICD-10-CM

## 2025-03-07 DIAGNOSIS — E11.69 DM TYPE 2 WITH DIABETIC MIXED HYPERLIPIDEMIA (MULTI): Primary | ICD-10-CM

## 2025-03-12 ENCOUNTER — TELEMEDICINE (OUTPATIENT)
Dept: PHARMACY | Facility: HOSPITAL | Age: 80
End: 2025-03-12
Payer: MEDICARE

## 2025-03-12 DIAGNOSIS — E11.69 DM TYPE 2 WITH DIABETIC MIXED HYPERLIPIDEMIA (MULTI): ICD-10-CM

## 2025-03-12 DIAGNOSIS — E78.2 DM TYPE 2 WITH DIABETIC MIXED HYPERLIPIDEMIA (MULTI): ICD-10-CM

## 2025-03-12 NOTE — PROGRESS NOTES
"Outpatient Geriatrics Clinical Pharmacy Visit  Inge Clark is a 80 y.o. female who was referred to the ambulatory Clinical Pharmacy Team for their No chief complaint on file..    Referring Provider: Elsa Ball CNP  Next appointment: ***    Preferred Pharmacy  [id number] - [place], [place] - [address]  [address] [place] [address]  Phone: [phone] Fax: [phone]     [facility]  [address] [address]  Phone: [phone] Fax: [phone]    Medication Access  Cost barriers: Yes- cost of CGM  Enrolled in  PAP: No  Manages own medication: No,   Transportation to the pharmacy: ***  Frequency of missed doses: ***/7 days    Renal/Hepatic Dosing  Current GFR (reported): *** mL/min/1.73 m2 as of ***  Current CrCl (calculated): *** mL/min as of *** labs  History of cirrhosis or hepatic dysfunction? ***    Spoke with patient's , Deacon, for the duration of the visit.     Diabetes Assessment  Inge has been diagnosed with diabetes \"for years\" and is unable to recall how long she has been diagnosed-  believes more than 7-8 years.     Inge has previously been diagnosed with severe vascular dementia with mood disturbance approximately 1 year ago, and is unable to manage her own medications or check her blood sugar on her own. Her  is her primary caregiver.     CGM would be massivley helpful    Never any low blood sugar with third party involvement.     Current Diabetes Pharmacotherapy  Pioglitazone 15 mg daily  Metformin IR 1000 mg BID    Historic Diabetes Pharmacotherapy  N/A    Secondary Prevention  Statin? Yes, describe: ***  ACE-I/ARB? {YES-DESCRIBE/NO:95061}  Aspirin? Yes    Pertinent PMH Review:  PMH of Pancreatitis: No  PMH of Gastroparesis: No  PMH of Retinopathy: No  PMH/FH of Medullary Thyroid Carcinoma or Multiple Endocrine Neoplasia Type 2: No  PMH of Urinary Tract Infections: No    Health " "Maintenance:   Monofilament Foot Exam: Has been completed at previous PCP appointments  Diabetic Eye Exam: \"Has been awhile\"  Lipid Panel: LDL 63 mg/dL, triglycerides 224  Urine Albumin/Creatinine Ratio: No labs    Lifestyle  Exercise: Inge is not very active, and is starting to \"slow down\"  Diet: Overall decently healthy, low salt, mindful of sugar content    Hyperglycemia/Hypoglycemia Awareness  Is patient hypoglycemia aware? Unsure, denies side effects  Is patient hyperglycemia aware? Unsure, denies side effects    Current monitoring regimen:   Patient is using: None  Testing frequency: Never  Barriers to testing: Patient's cognition and ability;  cannot operate lancing device    Patient-Reported Blood Glucose:  Unknown    Laboratory Results  Lab Results   Component Value Date    BILITOT 0.6 01/28/2025    CALCIUM 9.2 01/28/2025    CO2 30 01/28/2025    CL 97 (L) 01/28/2025    CREATININE 0.98 01/28/2025    GLUCOSE 148 (H) 01/28/2025    ALKPHOS 85 01/28/2025    K 3.3 (L) 01/28/2025    PROT 7.7 01/28/2025     01/28/2025    AST 22 01/28/2025    ALT 16 01/28/2025    BUN 21 01/28/2025    ANIONGAP 11 01/28/2025    MG 1.46 (L) 03/29/2024    PHOS 2.1 (L) 08/13/2022    ALBUMIN 4.3 01/28/2025    LIPASE 16 08/11/2022    GFRF 70 12/29/2022    EGFR 59 (L) 01/28/2025     Lab Results   Component Value Date    TRIG 224 (H) 01/28/2025    CHOL 142 01/28/2025    LDLCALC 63 01/28/2025    HDL 51 01/28/2025     Lab Results   Component Value Date    HGBA1C 7.7 (H) 01/28/2025    HGBA1C 8.0 (H) 03/29/2024    HGBA1C 7.3 (A) 12/29/2022       ASCVD Risk  The 10-year ASCVD risk score (Blanca STOREY, et al., 2019) is: 55.7%    Values used to calculate the score:      Age: 80 years      Sex: Female      Is Non- : No      Diabetic: Yes      Tobacco smoker: No      Systolic Blood Pressure: 144 mmHg      Is BP treated: Yes      HDL Cholesterol: 51 mg/dL      Total Cholesterol: 142 mg/dL      Assessment/Plan "   Problem List Items Addressed This Visit    None    Assessment  Inge's A1c is currently controlled for her age (<8%), as demonstrated by recent A1c of 7.7% as of 1/28/25    Plan/Changes   Continue all meds under the continuation of care with the referring provider and clinical pharmacy team  Specialists: Patient currently sees outpatient ***.  Start Elsie 3+    Next Clinical Pharmacy Follow-Up  ***  ***      Jasmyn Grossman, Luis Miguel     Verbal consent to manage patient's drug therapy was obtained from ***. They were informed they may decline to participate or withdraw from participation in pharmacy services at any time.

## 2025-03-21 ENCOUNTER — APPOINTMENT (OUTPATIENT)
Dept: RADIOLOGY | Facility: HOSPITAL | Age: 80
End: 2025-03-21
Payer: MEDICARE

## 2025-03-21 ENCOUNTER — HOSPITAL ENCOUNTER (EMERGENCY)
Facility: HOSPITAL | Age: 80
Discharge: HOME | End: 2025-03-21
Attending: EMERGENCY MEDICINE
Payer: MEDICARE

## 2025-03-21 VITALS
WEIGHT: 124 LBS | HEART RATE: 84 BPM | HEIGHT: 66 IN | RESPIRATION RATE: 25 BRPM | OXYGEN SATURATION: 77 % | BODY MASS INDEX: 19.93 KG/M2 | TEMPERATURE: 97.7 F | DIASTOLIC BLOOD PRESSURE: 50 MMHG | SYSTOLIC BLOOD PRESSURE: 110 MMHG

## 2025-03-21 DIAGNOSIS — R53.1 GENERALIZED WEAKNESS: Primary | ICD-10-CM

## 2025-03-21 LAB
ALBUMIN SERPL BCP-MCNC: 4 G/DL (ref 3.4–5)
ALP SERPL-CCNC: 83 U/L (ref 33–136)
ALT SERPL W P-5'-P-CCNC: 12 U/L (ref 7–45)
ANION GAP SERPL CALC-SCNC: 16 MMOL/L (ref 10–20)
APPEARANCE UR: CLEAR
AST SERPL W P-5'-P-CCNC: 15 U/L (ref 9–39)
BASOPHILS # BLD AUTO: 0.01 X10*3/UL (ref 0–0.1)
BASOPHILS NFR BLD AUTO: 0.1 %
BILIRUB SERPL-MCNC: 0.8 MG/DL (ref 0–1.2)
BILIRUB UR STRIP.AUTO-MCNC: NEGATIVE MG/DL
BUN SERPL-MCNC: 19 MG/DL (ref 6–23)
CALCIUM SERPL-MCNC: 8.8 MG/DL (ref 8.6–10.3)
CARDIAC TROPONIN I PNL SERPL HS: <3 NG/L (ref 0–13)
CHLORIDE SERPL-SCNC: 94 MMOL/L (ref 98–107)
CO2 SERPL-SCNC: 28 MMOL/L (ref 21–32)
COLOR UR: ABNORMAL
CREAT SERPL-MCNC: 0.93 MG/DL (ref 0.5–1.05)
EGFRCR SERPLBLD CKD-EPI 2021: 63 ML/MIN/1.73M*2
EOSINOPHIL # BLD AUTO: 0.04 X10*3/UL (ref 0–0.4)
EOSINOPHIL NFR BLD AUTO: 0.4 %
ERYTHROCYTE [DISTWIDTH] IN BLOOD BY AUTOMATED COUNT: 12.8 % (ref 11.5–14.5)
GLUCOSE SERPL-MCNC: 259 MG/DL (ref 74–99)
GLUCOSE UR STRIP.AUTO-MCNC: ABNORMAL MG/DL
HCT VFR BLD AUTO: 37.1 % (ref 36–46)
HGB BLD-MCNC: 12.7 G/DL (ref 12–16)
IMM GRANULOCYTES # BLD AUTO: 0.04 X10*3/UL (ref 0–0.5)
IMM GRANULOCYTES NFR BLD AUTO: 0.4 % (ref 0–0.9)
KETONES UR STRIP.AUTO-MCNC: NEGATIVE MG/DL
LEUKOCYTE ESTERASE UR QL STRIP.AUTO: ABNORMAL
LYMPHOCYTES # BLD AUTO: 1.2 X10*3/UL (ref 0.8–3)
LYMPHOCYTES NFR BLD AUTO: 12.5 %
MAGNESIUM SERPL-MCNC: 1.14 MG/DL (ref 1.6–2.4)
MCH RBC QN AUTO: 30.4 PG (ref 26–34)
MCHC RBC AUTO-ENTMCNC: 34.2 G/DL (ref 32–36)
MCV RBC AUTO: 89 FL (ref 80–100)
MONOCYTES # BLD AUTO: 0.58 X10*3/UL (ref 0.05–0.8)
MONOCYTES NFR BLD AUTO: 6.1 %
MUCOUS THREADS #/AREA URNS AUTO: ABNORMAL /LPF
NEUTROPHILS # BLD AUTO: 7.71 X10*3/UL (ref 1.6–5.5)
NEUTROPHILS NFR BLD AUTO: 80.5 %
NITRITE UR QL STRIP.AUTO: NEGATIVE
NRBC BLD-RTO: 0 /100 WBCS (ref 0–0)
PH UR STRIP.AUTO: 6.5 [PH]
PLATELET # BLD AUTO: 105 X10*3/UL (ref 150–450)
PLATELET CLUMP BLD QL SMEAR: PRESENT
POTASSIUM SERPL-SCNC: 3.4 MMOL/L (ref 3.5–5.3)
PROT SERPL-MCNC: 7.9 G/DL (ref 6.4–8.2)
PROT UR STRIP.AUTO-MCNC: NEGATIVE MG/DL
RBC # BLD AUTO: 4.18 X10*6/UL (ref 4–5.2)
RBC # UR STRIP.AUTO: ABNORMAL MG/DL
RBC #/AREA URNS AUTO: ABNORMAL /HPF
RBC MORPH BLD: NORMAL
SODIUM SERPL-SCNC: 135 MMOL/L (ref 136–145)
SP GR UR STRIP.AUTO: 1.02
UROBILINOGEN UR STRIP.AUTO-MCNC: NORMAL MG/DL
WBC # BLD AUTO: 9.6 X10*3/UL (ref 4.4–11.3)
WBC #/AREA URNS AUTO: ABNORMAL /HPF
WBC CLUMPS #/AREA URNS AUTO: ABNORMAL /HPF

## 2025-03-21 PROCEDURE — 96365 THER/PROPH/DIAG IV INF INIT: CPT

## 2025-03-21 PROCEDURE — 87086 URINE CULTURE/COLONY COUNT: CPT | Mod: GEALAB | Performed by: PHYSICIAN ASSISTANT

## 2025-03-21 PROCEDURE — 73080 X-RAY EXAM OF ELBOW: CPT | Mod: RIGHT SIDE | Performed by: RADIOLOGY

## 2025-03-21 PROCEDURE — 80053 COMPREHEN METABOLIC PANEL: CPT | Performed by: PHYSICIAN ASSISTANT

## 2025-03-21 PROCEDURE — 84484 ASSAY OF TROPONIN QUANT: CPT | Performed by: PHYSICIAN ASSISTANT

## 2025-03-21 PROCEDURE — 96366 THER/PROPH/DIAG IV INF ADDON: CPT

## 2025-03-21 PROCEDURE — 85025 COMPLETE CBC W/AUTO DIFF WBC: CPT | Performed by: PHYSICIAN ASSISTANT

## 2025-03-21 PROCEDURE — 71046 X-RAY EXAM CHEST 2 VIEWS: CPT | Performed by: RADIOLOGY

## 2025-03-21 PROCEDURE — 71046 X-RAY EXAM CHEST 2 VIEWS: CPT

## 2025-03-21 PROCEDURE — 73030 X-RAY EXAM OF SHOULDER: CPT | Mod: RT

## 2025-03-21 PROCEDURE — 2500000002 HC RX 250 W HCPCS SELF ADMINISTERED DRUGS (ALT 637 FOR MEDICARE OP, ALT 636 FOR OP/ED): Performed by: PHYSICIAN ASSISTANT

## 2025-03-21 PROCEDURE — 99285 EMERGENCY DEPT VISIT HI MDM: CPT | Mod: 25 | Performed by: EMERGENCY MEDICINE

## 2025-03-21 PROCEDURE — 2500000004 HC RX 250 GENERAL PHARMACY W/ HCPCS (ALT 636 FOR OP/ED): Performed by: PHYSICIAN ASSISTANT

## 2025-03-21 PROCEDURE — 70450 CT HEAD/BRAIN W/O DYE: CPT

## 2025-03-21 PROCEDURE — 83735 ASSAY OF MAGNESIUM: CPT | Performed by: PHYSICIAN ASSISTANT

## 2025-03-21 PROCEDURE — 73080 X-RAY EXAM OF ELBOW: CPT | Mod: RT

## 2025-03-21 PROCEDURE — 73030 X-RAY EXAM OF SHOULDER: CPT | Mod: RIGHT SIDE | Performed by: RADIOLOGY

## 2025-03-21 PROCEDURE — 81001 URINALYSIS AUTO W/SCOPE: CPT | Performed by: PHYSICIAN ASSISTANT

## 2025-03-21 PROCEDURE — 36415 COLL VENOUS BLD VENIPUNCTURE: CPT | Performed by: PHYSICIAN ASSISTANT

## 2025-03-21 PROCEDURE — 70450 CT HEAD/BRAIN W/O DYE: CPT | Performed by: RADIOLOGY

## 2025-03-21 RX ORDER — MAGNESIUM SULFATE HEPTAHYDRATE 40 MG/ML
2 INJECTION, SOLUTION INTRAVENOUS ONCE
Status: COMPLETED | OUTPATIENT
Start: 2025-03-21 | End: 2025-03-21

## 2025-03-21 RX ORDER — POTASSIUM CHLORIDE 20 MEQ/1
40 TABLET, EXTENDED RELEASE ORAL ONCE
Status: COMPLETED | OUTPATIENT
Start: 2025-03-21 | End: 2025-03-21

## 2025-03-21 RX ADMIN — POTASSIUM CHLORIDE 40 MEQ: 1500 TABLET, EXTENDED RELEASE ORAL at 12:17

## 2025-03-21 RX ADMIN — MAGNESIUM SULFATE HEPTAHYDRATE 2 G: 40 INJECTION, SOLUTION INTRAVENOUS at 12:19

## 2025-03-21 RX ADMIN — SODIUM CHLORIDE 500 ML: 0.9 INJECTION, SOLUTION INTRAVENOUS at 12:17

## 2025-03-21 ASSESSMENT — LIFESTYLE VARIABLES
EVER HAD A DRINK FIRST THING IN THE MORNING TO STEADY YOUR NERVES TO GET RID OF A HANGOVER: NO
HAVE PEOPLE ANNOYED YOU BY CRITICIZING YOUR DRINKING: NO
EVER FELT BAD OR GUILTY ABOUT YOUR DRINKING: NO
TOTAL SCORE: 0
HAVE YOU EVER FELT YOU SHOULD CUT DOWN ON YOUR DRINKING: NO

## 2025-03-21 ASSESSMENT — PAIN DESCRIPTION - LOCATION: LOCATION: LEG

## 2025-03-21 ASSESSMENT — ACTIVITIES OF DAILY LIVING (ADL): LACK_OF_TRANSPORTATION: NO

## 2025-03-21 ASSESSMENT — PAIN - FUNCTIONAL ASSESSMENT: PAIN_FUNCTIONAL_ASSESSMENT: 0-10

## 2025-03-21 ASSESSMENT — PAIN DESCRIPTION - ORIENTATION: ORIENTATION: RIGHT

## 2025-03-21 ASSESSMENT — PAIN SCALES - GENERAL: PAINLEVEL_OUTOF10: 10 - WORST POSSIBLE PAIN

## 2025-03-21 NOTE — ED TRIAGE NOTES
Patient here for generalized weakness Increase in falls over the last month. States she has right sided leg weakness and pain. Also right arm pain from  trying to get her out of the chair

## 2025-03-21 NOTE — PROGRESS NOTES
03/21/25 1158   Discharge Planning   Living Arrangements Spouse/significant other   Support Systems Spouse/significant other;Children   Assistance Needed A&OX2-3(waxes and wanes); dependent on assist from spouse for all ADLs -refuses to use DME; doesn't drive; room air baseline and currently room air; PCP is Maryann Marinelli CNP   Type of Residence Private residence   Number of Stairs to Enter Residence 3   Number of Stairs Within Residence 0   Do you have animals or pets at home? No   Who is requesting discharge planning? Provider   Expected Discharge Disposition Home H  ([facility] is preference. Family doesn't want snf at this time due to cognition. Spouse and son Deacon given resources for private duty agencies to help spouse in the home with patient)   Does the patient need discharge transport arranged? Yes   RoundTrip coordination needed? Yes   Has discharge transport been arranged? No   Financial Resource Strain   How hard is it for you to pay for the very basics like food, housing, medical care, and heating? Not hard   Housing Stability   In the last 12 months, was there a time when you were not able to pay the mortgage or rent on time? N   In the past 12 months, how many times have you moved where you were living? 0   At any time in the past 12 months, were you homeless or living in a shelter (including now)? N   Transportation Needs   In the past 12 months, has lack of transportation kept you from medical appointments or from getting medications? no   In the past 12 months, has lack of transportation kept you from meetings, work, or from getting things needed for daily living? No   Intensity of Service   Intensity of Service 0-30 min     03/21/2025 1202pm  Spoke with patient's spouse and son Deacon in ED. At this time, their preference would be home when medically ready with [facility]. Son given resources for private pay private duty in the home to assist patient's spouse for patient care

## 2025-03-22 LAB — HOLD SPECIMEN: NORMAL

## 2025-03-22 NOTE — ED PROVIDER NOTES
HPI   Chief Complaint   Patient presents with    Weakness, Gen     Increase in falls over the last month. States she has right sided leg weakness and pain. Also right arm pain from  trying to get her out of the chair        History of present illness:  80-year-old female presents emergency room with complaints of generalized weakness.  The patient is companied by her family provides the primary history.  The son is at bedside as well as the  and they state that the patient has a past medical history of type 2 diabetes coronary artery disease hypertension and hyperlipidemia.  They state that she also has advanced dementia and that she currently still lives at home.  The  states that she has fallen multiple times the past 6 weeks and he states that she tends get her feet caught on things that she does not lift her feet when she is walking now she tends to shuffle.  They state that when she is walking she will not raise her feet and that she will get caught on carpet and cause her self to fall.  They state the most recent 1 occurred about a week ago and she landed on her right side and was witnessed fall and she did not hit her head.  They state that she has been complaining about right elbow pain as well as right shoulder pain since the fall.  The patient at this time states that she is feels very tired and the son who is at bedside states that she has just been sleeping a lot and has not been very active and he was concerned as this is unusual for her.  He states that she has not had any other symptoms and has not had any vomiting and the patient denies any vision changes or any pain anywhere else other than her right shoulder.    Social history: Negative for alcohol and drug use.    Review of systems:   Gen.: No weight loss, fatigue, anorexia, insomnia, fever.   Eyes: No vision loss, double vision  ENT: No pharyngitis, neck pain  Cardiac: No chest pain, palpitations, syncope, near syncope.    Pulmonary: No shortness of breath, cough, hemoptysis.   Heme/lymph: No swollen glands, fever, bleeding.   GI: No abdominal pain, change in bowel habits, melena, hematemesis, hematochezia, nausea, vomiting, diarrhea.   : No discharge, dysuria, frequency, urgency, hematuria.   Musculoskeletal: No limb pain, joint pain, joint swelling.   Skin: No rashes.   Review of systems is otherwise negative unless stated above or in history of present illness.        Physical exam:  General: Vitals noted, no distress. Afebrile.   EENT: No lymphadenopathy appreciated, no pain to palpation of the cervical spine  Cardiac: Regular, rate, rhythm, no murmur.   Pulmonary: Lungs clear bilaterally with good aeration. No adventitious breath sounds.   Abdomen: Soft, nonsurgical. Nontender. No peritoneal signs. Normoactive bowel sounds.   Extremities: No peripheral edema.   Skin: No rash.   Neuro: No focal neurologic deficits      Medical decision making:   Testin-10 white blood cells seen on the urine white blood cell clumps present but no bacteria present, urine culture sent CBC unremarkable CMP shows glucose of 259 potassium 3.4 was replaced with p.o. potassium, magnesium replaced at 1.14 with 2 g IV magnesium, CT scan of the head did not show acute findings right shoulder and elbow x-rays as interpreted by radiology did not see any acute findings.  Treatment:   Reevaluation:   Plan: Home-going.  Discussed differential. Will follow-up with the primary physician in the next 2-3 days. Return if worse. They understand return precautions and discharge instructions. Patient and family/friend/caregiver are in agreement with this plan. 80-year-old female presents emergency room with complaints of generalized weakness.  The patient is companied by her family provides the primary history.  The son is at bedside as well as the  and they state that the patient has a past medical history of type 2 diabetes coronary artery disease  hypertension and hyperlipidemia.  They state that she also has advanced dementia and that she currently still lives at home.  The  states that she has fallen multiple times the past 6 weeks and he states that she tends get her feet caught on things that she does not lift her feet when she is walking now she tends to shuffle.  They state that when she is walking she will not raise her feet and that she will get caught on carpet and cause her self to fall.  They state the most recent 1 occurred about a week ago and she landed on her right side and was witnessed fall and she did not hit her head.  They state that she has been complaining about right elbow pain as well as right shoulder pain since the fall.  The patient at this time states that she is feels very tired and the son who is at bedside states that she has just been sleeping a lot and has not been very active and he was concerned as this is unusual for her.  He states that she has not had any other symptoms and has not had any vomiting and the patient denies any vision changes or any pain anywhere else other than her right shoulder. Cardiac: Regular, rate, rhythm, no murmur.   Pulmonary: Lungs clear bilaterally with good aeration. No adventitious breath sounds.   Abdomen: Soft, nonsurgical. Nontender. No peritoneal signs. Normoactive bowel sounds.  After the patient received all of her medications I spoke with the family at bedside in particular the  who is present.  He explained to me they want to take the patient home if possible.  I explained to them that I do not have a reason for her generalized weakness and fatigue and he explained to me that just want to follow-up with her primary care doctor.  I spoke with the attending physician as well who evaluated patient felt comfortable with this.  I explained to them that the patient had any worsening symptoms to have her return to the emergency room.  Impression:   1.  Generalized  weakness            History provided by:  Patient   used: No            Patient History   Past Medical History:   Diagnosis Date    Elevated troponin I level 08/15/2024    Essential (primary) hypertension 12/29/2022    HTN (hypertension), benign    Insect bite (nonvenomous) of scalp, initial encounter (CODE) 06/05/2017    Tick bite of scalp, initial encounter    Listeriosis, unspecified 05/23/2016    Listeria infection    Other conditions influencing health status 01/29/2018    Normal colonoscopy    Pyelonephritis of left kidney 04/11/2023    Type 2 diabetes mellitus with other specified complication 12/29/2022    DM type 2 with diabetic mixed hyperlipidemia    Unilateral primary osteoarthritis, right hip 12/09/2019    Primary osteoarthritis of right hip    Vitamin D deficiency, unspecified     Vitamin D deficiency     Past Surgical History:   Procedure Laterality Date    OTHER SURGICAL HISTORY  09/30/2019    Tonsillectomy with adenoidectomy     Family History   Problem Relation Name Age of Onset    Stroke Mother      Heart attack Mother      Hypertension Father       Social History     Tobacco Use    Smoking status: Never    Smokeless tobacco: Never   Vaping Use    Vaping status: Never Used   Substance Use Topics    Alcohol use: Yes     Alcohol/week: 1.0 standard drink of alcohol     Types: 1 Standard drinks or equivalent per week     Comment: Socially    Drug use: Never       Physical Exam   ED Triage Vitals [03/21/25 1053]   Temperature Heart Rate Respirations BP   36.5 °C (97.7 °F) (!) 118 18 90/67      Pulse Ox Temp Source Heart Rate Source Patient Position   96 % Skin Monitor Lying      BP Location FiO2 (%)     Right arm --       Physical Exam      ED Course & MDM   ED Course as of 03/21/25 2001   Fri Mar 21, 2025   1317 Resident staffing documentation:  This patient was seen by the advanced practice provider.  I have personally performed a substantive portion of the encounter.  I have seen  and examined the patient; agree with the workup, evaluation, MDM, management and diagnosis.  The care plan has been discussed.  I personally saw the patient and made/approved the management plan and take responsibility for the patient management.     80-year-old female history of severe vascular dementia, recurrent falls, T2DM presenting to the ED for evaluation of generalized weakness and increasing falls over the last month.  Patient's  notes that today the patient had a fall and they were trying to pull her up when she hurt her right arm.  She did not reportedly hit her but has had multiple falls over the last few weeks.  Otherwise denies any current symptoms at my exam, is having no chest pain, shortness of breath, abdominal pain.  Has no focal strength or sensory deficits on exam.  She is endorsing some mild tenderness over the right shoulder and elbow for which x-rays were obtained and showed no fracture or dislocation.  She had a chest x-ray that showed no focal pneumonia.  Basic labs showed a mild hyponatremia and hypomagnesemia for which repletion was ordered.  Additionally discussed obtaining a CT head as has been noted some behavioral changes to rule out ICH.  Patient otherwise wishes for home-going plan,  confirms this.  Transitional care coordinator you waited family and are working on home-going resources.  Offered admission for PT/OT and consideration of rehabilitation placement but family elected for discharge with outpatient follow-up.  Discussed appropriateness of follow-up with electrolyte abnormalities.    Linda Espino DO  EM PGY-3   [KR]      ED Course User Index  [KR] Linda Espino DO         Diagnoses as of 03/21/25 2001   Generalized weakness                 No data recorded     Gigi Coma Scale Score: 15 (03/21/25 1054 : Selvin Dela Cruz RN)                           Medical Decision Making      Procedure  Procedures     Adriel Roche PA-C  03/21/25 2008

## 2025-03-23 LAB — BACTERIA UR CULT: NORMAL

## 2025-03-24 ENCOUNTER — TELEPHONE (OUTPATIENT)
Dept: PRIMARY CARE | Facility: CLINIC | Age: 80
End: 2025-03-24
Payer: MEDICARE

## 2025-03-24 DIAGNOSIS — R26.89 BALANCE PROBLEM: ICD-10-CM

## 2025-03-24 NOTE — TELEPHONE ENCOUNTER
He called he needs something to call him to go over his wife's med list so he knows what she is to take please call him at [phone]

## 2025-03-26 ENCOUNTER — APPOINTMENT (OUTPATIENT)
Dept: PHARMACY | Facility: HOSPITAL | Age: 80
End: 2025-03-26
Payer: MEDICARE

## 2025-03-26 DIAGNOSIS — E11.69 DM TYPE 2 WITH DIABETIC MIXED HYPERLIPIDEMIA (MULTI): ICD-10-CM

## 2025-03-26 DIAGNOSIS — E78.2 DM TYPE 2 WITH DIABETIC MIXED HYPERLIPIDEMIA (MULTI): ICD-10-CM

## 2025-03-26 NOTE — PROGRESS NOTES
Outpatient Geriatrics Clinical Pharmacy Visit  Inge Clark is a 80 y.o. female who was referred to the ambulatory Clinical Pharmacy Team for their Diabetes.    Referring Provider: Elsa Ball CNP    Preferred Pharmacy  [id number] - [place], [place] - [address]  [address]  [place] [address]  Phone: [phone] Fax: [phone]    [facility]  [address] [facility] [address]  Phone: [phone] Fax: [phone]    Discussion  Discussed with  Deacon today that previous attempt to get a CGM covered under Medicare Part B for patient is not approved, as she does not meet any of the stipulations for coverage as determined by her insurance. One of the following must be true:  She is on daily insulin  Experiences the 'katherine phenomenon' exceeding 200 mg/dL  Wide variations in BG before lunch/dinner exceeding 100 mg/dL  Insulin pump status  Inadequate glycemic control despite adjustments in insulin therapy and compliance with frequent self monitoring,   Recurring episodes of severe hypoglycemia <50 mg/dL     voiced his understanding, and has planned to chat with local pharmacist about how to use manual glucometer.       Assessment/Plan   Problem List Items Addressed This Visit    None  Visit Diagnoses       DM type 2 with diabetic mixed hyperlipidemia (Multi)              Plan/Changes   Continue all meds under the continuation of care with the referring provider and clinical pharmacy team    Next Clinical Pharmacy Follow-Up  As needed      Jasmyn Grossman, PharmD     Verbal consent to manage patient's drug therapy was obtained from the patient's , Deacon. They were informed they may decline to participate or withdraw from participation in pharmacy services at any time.

## 2025-03-27 ENCOUNTER — TELEPHONE (OUTPATIENT)
Dept: PRIMARY CARE | Facility: CLINIC | Age: 80
End: 2025-03-27
Payer: MEDICARE

## 2025-03-27 DIAGNOSIS — E11.65 TYPE 2 DIABETES MELLITUS WITH HYPERGLYCEMIA, WITHOUT LONG-TERM CURRENT USE OF INSULIN: ICD-10-CM

## 2025-03-27 DIAGNOSIS — R26.89 BALANCE PROBLEM: Primary | ICD-10-CM

## 2025-03-27 DIAGNOSIS — F01.C3 SEVERE VASCULAR DEMENTIA WITH MOOD DISTURBANCE: ICD-10-CM

## 2025-03-27 NOTE — TELEPHONE ENCOUNTER
Can order but may need a Face to Face evaluation for ordering HHC unless she was given an order while she was in the ER.

## 2025-03-27 NOTE — TELEPHONE ENCOUNTER
would like her to have home health therapy as he can not get her out of the house.   Can they get a referral and scheduled for that?  Please advise

## 2025-03-30 ENCOUNTER — TELEPHONE (OUTPATIENT)
Dept: HOME HEALTH SERVICES | Facility: HOME HEALTH | Age: 80
End: 2025-03-30
Payer: MEDICARE

## 2025-03-30 NOTE — TELEPHONE ENCOUNTER
Good morning,   Thank you for the referral for home health. Unfortunately, we are unable to accept at this time due to not having an updated [id number] discussing the need for home health. If referral is needed please have patient seen. We accept visual telemedicine f2f. if you have further questions, feel free to reach out to our office at [phone].     Thank you,   [facility] Intake.

## 2025-03-31 DIAGNOSIS — E11.69 DM TYPE 2 WITH DIABETIC MIXED HYPERLIPIDEMIA (MULTI): ICD-10-CM

## 2025-03-31 DIAGNOSIS — E78.2 DM TYPE 2 WITH DIABETIC MIXED HYPERLIPIDEMIA (MULTI): ICD-10-CM

## 2025-03-31 RX ORDER — ATORVASTATIN CALCIUM 40 MG/1
40 TABLET, FILM COATED ORAL DAILY
Qty: 90 TABLET | Refills: 3 | Status: SHIPPED | OUTPATIENT
Start: 2025-03-31 | End: 2026-03-26

## 2025-04-02 ENCOUNTER — APPOINTMENT (OUTPATIENT)
Dept: PRIMARY CARE | Facility: CLINIC | Age: 80
End: 2025-04-02
Payer: MEDICARE

## 2025-04-10 ENCOUNTER — HOME HEALTH ADMISSION (OUTPATIENT)
Dept: HOME HEALTH SERVICES | Facility: HOME HEALTH | Age: 80
End: 2025-04-10
Payer: MEDICARE

## 2025-04-10 ENCOUNTER — DOCUMENTATION (OUTPATIENT)
Dept: HOME HEALTH SERVICES | Facility: HOME HEALTH | Age: 80
End: 2025-04-10

## 2025-04-10 ENCOUNTER — APPOINTMENT (OUTPATIENT)
Dept: PRIMARY CARE | Facility: CLINIC | Age: 80
End: 2025-04-10
Payer: MEDICARE

## 2025-04-10 VITALS
SYSTOLIC BLOOD PRESSURE: 121 MMHG | HEIGHT: 66 IN | HEART RATE: 76 BPM | BODY MASS INDEX: 20.41 KG/M2 | WEIGHT: 127 LBS | DIASTOLIC BLOOD PRESSURE: 77 MMHG

## 2025-04-10 DIAGNOSIS — E78.2 MIXED DIABETIC HYPERLIPIDEMIA ASSOCIATED WITH TYPE 2 DIABETES MELLITUS: ICD-10-CM

## 2025-04-10 DIAGNOSIS — E11.69 MIXED DIABETIC HYPERLIPIDEMIA ASSOCIATED WITH TYPE 2 DIABETES MELLITUS: ICD-10-CM

## 2025-04-10 DIAGNOSIS — E11.69 DM TYPE 2 WITH DIABETIC MIXED HYPERLIPIDEMIA (MULTI): ICD-10-CM

## 2025-04-10 DIAGNOSIS — E78.2 DM TYPE 2 WITH DIABETIC MIXED HYPERLIPIDEMIA (MULTI): ICD-10-CM

## 2025-04-10 DIAGNOSIS — R13.10 DYSPHAGIA, UNSPECIFIED TYPE: Primary | ICD-10-CM

## 2025-04-10 DIAGNOSIS — R53.1 GENERALIZED WEAKNESS: ICD-10-CM

## 2025-04-10 DIAGNOSIS — F01.C3 SEVERE VASCULAR DEMENTIA WITH MOOD DISTURBANCE: ICD-10-CM

## 2025-04-10 PROCEDURE — 3078F DIAST BP <80 MM HG: CPT | Performed by: CLINICAL NURSE SPECIALIST

## 2025-04-10 PROCEDURE — 99214 OFFICE O/P EST MOD 30 MIN: CPT | Performed by: CLINICAL NURSE SPECIALIST

## 2025-04-10 PROCEDURE — 1036F TOBACCO NON-USER: CPT | Performed by: CLINICAL NURSE SPECIALIST

## 2025-04-10 PROCEDURE — 1159F MED LIST DOCD IN RCRD: CPT | Performed by: CLINICAL NURSE SPECIALIST

## 2025-04-10 PROCEDURE — 1160F RVW MEDS BY RX/DR IN RCRD: CPT | Performed by: CLINICAL NURSE SPECIALIST

## 2025-04-10 PROCEDURE — 1123F ACP DISCUSS/DSCN MKR DOCD: CPT | Performed by: CLINICAL NURSE SPECIALIST

## 2025-04-10 PROCEDURE — 3074F SYST BP LT 130 MM HG: CPT | Performed by: CLINICAL NURSE SPECIALIST

## 2025-04-10 RX ORDER — BLOOD SUGAR DIAGNOSTIC
STRIP MISCELLANEOUS
Qty: 200 STRIP | Refills: 3 | Status: ON HOLD | OUTPATIENT
Start: 2025-04-10

## 2025-04-10 ASSESSMENT — ENCOUNTER SYMPTOMS
WHEEZING: 0
WOUND: 0
SHORTNESS OF BREATH: 0
MYALGIAS: 0
COUGH: 0
FATIGUE: 0
NAUSEA: 0
UNEXPECTED WEIGHT CHANGE: 0
APPETITE CHANGE: 0
EYE PAIN: 0
CONFUSION: 0
FLANK PAIN: 0
PHOTOPHOBIA: 0
CONSTIPATION: 0
BACK PAIN: 0
FEVER: 0
PALPITATIONS: 0
SORE THROAT: 0
ARTHRALGIAS: 0
ACTIVITY CHANGE: 0
DYSURIA: 0
SEIZURES: 0
POLYDIPSIA: 0
TROUBLE SWALLOWING: 0
BLOOD IN STOOL: 0
DIZZINESS: 0
VOMITING: 0
CHEST TIGHTNESS: 0
JOINT SWELLING: 0
ABDOMINAL PAIN: 0
HEMATURIA: 0
DIARRHEA: 0
SLEEP DISTURBANCE: 0
HEADACHES: 0
CHILLS: 0
BRUISES/BLEEDS EASILY: 0
NECK PAIN: 0

## 2025-04-10 NOTE — PROGRESS NOTES
Subjective   Patient ID: Inge Clark is a 80 y.o. female who presents for Follow-up (Having a hard time swallowing metformin. ), home care (Discuss home care), and Diarrhea.  HPI    Here today as a follow up appointment. Presents today with her . Poor historian so most information is obtained from the . Lives with her  at home. States that she takes her medications as prescribed. Denies any recent illnesses. Denies any complaints of pain. Denies any chest pain or shortness of breath. Adult children live close by and check in with them.     Has been noticing over the past 3-4 weeks increased issues with eating chicken/steak.  has been cutting up the foods. Having increased issues with feeling like the food is getting stuck, at times will vomit. Does well with thin liquids, and and her smaller pills. Has been putting the Metformin in Applesauce.      Does have an appointment with Gerontology in May, plan to continue Donepezil.     Interested in having Therapy at home. Was seen in the ER on 03/21/2025 with complaints of weakness.  states she is still weak,  is helping to care with ambulation. Would like Therapy at home.      Review of Systems   Constitutional:  Negative for activity change, appetite change, chills, fatigue, fever and unexpected weight change.   HENT:  Negative for ear pain, hearing loss, nosebleeds, sore throat, tinnitus and trouble swallowing.    Eyes:  Negative for photophobia, pain and visual disturbance.   Respiratory:  Negative for cough, chest tightness, shortness of breath and wheezing.    Cardiovascular:  Negative for chest pain, palpitations and leg swelling.   Gastrointestinal:  Negative for abdominal pain, blood in stool, constipation, diarrhea, nausea and vomiting.   Endocrine: Negative for cold intolerance, heat intolerance, polydipsia and polyuria.   Genitourinary:  Negative for dysuria, flank pain and hematuria.   Musculoskeletal:  Negative  for arthralgias, back pain, joint swelling, myalgias and neck pain.   Skin:  Negative for pallor, rash and wound.   Allergic/Immunologic: Negative for immunocompromised state.   Neurological:  Negative for dizziness, seizures and headaches.   Hematological:  Does not bruise/bleed easily.   Psychiatric/Behavioral:  Negative for confusion and sleep disturbance.        Objective   Physical Exam  Vitals and nursing note reviewed.   Constitutional:       General: She is not in acute distress.     Appearance: Normal appearance.   HENT:      Head: Normocephalic.      Nose: Nose normal.   Eyes:      Conjunctiva/sclera: Conjunctivae normal.   Neck:      Vascular: No carotid bruit.   Cardiovascular:      Rate and Rhythm: Normal rate and regular rhythm.      Pulses: Normal pulses.      Heart sounds: Normal heart sounds.   Pulmonary:      Effort: Pulmonary effort is normal.      Breath sounds: Normal breath sounds.   Abdominal:      General: Bowel sounds are normal.      Palpations: Abdomen is soft.   Musculoskeletal:         General: Normal range of motion.      Cervical back: Normal range of motion.   Skin:     General: Skin is warm and dry.   Neurological:      Mental Status: She is alert and oriented to person, place, and time. Mental status is at baseline.   Psychiatric:         Mood and Affect: Mood normal.         Behavior: Behavior normal.       Assessment/Plan       Reviewed most recent records available with patient.      Diabetes: Most recent A1C 7.7%. Continue Metformin and Actos. Encourage updated Eye exam. Albumin ordered.  Does not monitor her blood sugars at home, declined monitoring. Has the supplies available to monitor. New strips ordered as theirs have .    Hyperlipidemia: Continue Atorvastatin. Updated lab work ordered.   Hypertension: Blood pressure controlled at OV today. Continue to monitor.   Vitamin D Deficiency: Updated lab work ordered.   Cognitive Impairment, Dementia, Weakness: Continue  Aricept as prescribed. Safe at home per . Following with Geriatrics. [facility] ordered for patient.   Dysphagia: Gastro referral.      Bone Density: July 2022, normal. Declined.   Medicare Wellness: January 2025.   COVID Vaccine: November 2021.   Prevnar 20: July 2023.   Flu Vaccine: October 2024.   Tdap: July 2012.     Maryann Marinelli, PRAVIN-CNS 04/10/25 12:52 PM

## 2025-04-10 NOTE — HH CARE COORDINATION
Home Care received a Referral for Nursing, Physical Therapy, Occupational Therapy, and Speech Language Pathology. We have processed the referral for a Start of Care on 4/12/2025.     If you have any questions or concerns, please feel free to contact us at [phone]. Follow the prompts, enter your five digit zip code, and you will be directed to your care team on E[facility].

## 2025-04-11 NOTE — PROGRESS NOTES
Referral from provider     Outreach to pts   Discussed ccm briefly but preferred to talk Monday around 10am   Sent message to provider

## 2025-04-13 ENCOUNTER — APPOINTMENT (OUTPATIENT)
Dept: RADIOLOGY | Facility: HOSPITAL | Age: 80
End: 2025-04-13
Payer: MEDICARE

## 2025-04-13 ENCOUNTER — APPOINTMENT (OUTPATIENT)
Dept: CARDIOLOGY | Facility: HOSPITAL | Age: 80
End: 2025-04-13
Payer: MEDICARE

## 2025-04-13 ENCOUNTER — HOSPITAL ENCOUNTER (INPATIENT)
Facility: HOSPITAL | Age: 80
End: 2025-04-13
Attending: STUDENT IN AN ORGANIZED HEALTH CARE EDUCATION/TRAINING PROGRAM | Admitting: FAMILY MEDICINE
Payer: MEDICARE

## 2025-04-13 VITALS
HEIGHT: 63 IN | DIASTOLIC BLOOD PRESSURE: 53 MMHG | HEART RATE: 75 BPM | WEIGHT: 130.2 LBS | SYSTOLIC BLOOD PRESSURE: 124 MMHG | TEMPERATURE: 97.9 F | BODY MASS INDEX: 23.07 KG/M2 | OXYGEN SATURATION: 93 % | RESPIRATION RATE: 17 BRPM

## 2025-04-13 DIAGNOSIS — E83.42 HYPOMAGNESEMIA: Primary | ICD-10-CM

## 2025-04-13 DIAGNOSIS — M10.9 ACUTE GOUT OF RIGHT WRIST, UNSPECIFIED CAUSE: ICD-10-CM

## 2025-04-13 DIAGNOSIS — E11.9 TYPE 2 DIABETES MELLITUS WITHOUT COMPLICATION, WITHOUT LONG-TERM CURRENT USE OF INSULIN: ICD-10-CM

## 2025-04-13 DIAGNOSIS — G47.00 INSOMNIA, UNSPECIFIED TYPE: ICD-10-CM

## 2025-04-13 DIAGNOSIS — I10 PRIMARY HYPERTENSION: ICD-10-CM

## 2025-04-13 DIAGNOSIS — R19.7 DIARRHEA, UNSPECIFIED TYPE: ICD-10-CM

## 2025-04-13 DIAGNOSIS — R53.1 WEAKNESS: ICD-10-CM

## 2025-04-13 LAB
ALBUMIN SERPL BCP-MCNC: 4.2 G/DL (ref 3.4–5)
ALP SERPL-CCNC: 123 U/L (ref 33–136)
ALT SERPL W P-5'-P-CCNC: 14 U/L (ref 7–45)
ANION GAP BLDV CALCULATED.4IONS-SCNC: 5 MMOL/L (ref 10–25)
ANION GAP SERPL CALC-SCNC: 16 MMOL/L (ref 10–20)
APPEARANCE UR: CLEAR
AST SERPL W P-5'-P-CCNC: 18 U/L (ref 9–39)
BASE EXCESS BLDV CALC-SCNC: 4.2 MMOL/L (ref -2–3)
BASOPHILS # BLD AUTO: 0.03 X10*3/UL (ref 0–0.1)
BASOPHILS NFR BLD AUTO: 0.3 %
BILIRUB SERPL-MCNC: 0.7 MG/DL (ref 0–1.2)
BILIRUB UR STRIP.AUTO-MCNC: NEGATIVE MG/DL
BNP SERPL-MCNC: 36 PG/ML (ref 0–99)
BODY TEMPERATURE: ABNORMAL
BUN SERPL-MCNC: 24 MG/DL (ref 6–23)
CA-I BLDV-SCNC: 1.18 MMOL/L (ref 1.1–1.33)
CALCIUM SERPL-MCNC: 8.9 MG/DL (ref 8.6–10.3)
CARDIAC TROPONIN I PNL SERPL HS: 4 NG/L (ref 0–13)
CARDIAC TROPONIN I PNL SERPL HS: 4 NG/L (ref 0–13)
CHLORIDE BLDV-SCNC: 99 MMOL/L (ref 98–107)
CHLORIDE SERPL-SCNC: 94 MMOL/L (ref 98–107)
CO2 SERPL-SCNC: 29 MMOL/L (ref 21–32)
COLOR UR: ABNORMAL
CREAT SERPL-MCNC: 1.07 MG/DL (ref 0.5–1.05)
EGFRCR SERPLBLD CKD-EPI 2021: 53 ML/MIN/1.73M*2
EOSINOPHIL # BLD AUTO: 0.2 X10*3/UL (ref 0–0.4)
EOSINOPHIL NFR BLD AUTO: 2 %
ERYTHROCYTE [DISTWIDTH] IN BLOOD BY AUTOMATED COUNT: 13.2 % (ref 11.5–14.5)
GLUCOSE BLD MANUAL STRIP-MCNC: 144 MG/DL (ref 74–99)
GLUCOSE BLD MANUAL STRIP-MCNC: 193 MG/DL (ref 74–99)
GLUCOSE BLD MANUAL STRIP-MCNC: 199 MG/DL (ref 74–99)
GLUCOSE BLDV-MCNC: 206 MG/DL (ref 74–99)
GLUCOSE SERPL-MCNC: 198 MG/DL (ref 74–99)
GLUCOSE UR STRIP.AUTO-MCNC: NORMAL MG/DL
HCO3 BLDV-SCNC: 28.4 MMOL/L (ref 22–26)
HCT VFR BLD AUTO: 38.4 % (ref 36–46)
HCT VFR BLD EST: 40 % (ref 36–46)
HGB BLD-MCNC: 13.3 G/DL (ref 12–16)
HGB BLDV-MCNC: 13.4 G/DL (ref 12–16)
IMM GRANULOCYTES # BLD AUTO: 0.02 X10*3/UL (ref 0–0.5)
IMM GRANULOCYTES NFR BLD AUTO: 0.2 % (ref 0–0.9)
INHALED O2 CONCENTRATION: 21 %
KETONES UR STRIP.AUTO-MCNC: NEGATIVE MG/DL
LACTATE BLDV-SCNC: 3 MMOL/L (ref 0.4–2)
LACTATE BLDV-SCNC: 3.7 MMOL/L (ref 0.4–2)
LACTATE SERPL-SCNC: 1.4 MMOL/L (ref 0.4–2)
LEUKOCYTE ESTERASE UR QL STRIP.AUTO: ABNORMAL
LIPASE SERPL-CCNC: 49 U/L (ref 9–82)
LYMPHOCYTES # BLD AUTO: 2.37 X10*3/UL (ref 0.8–3)
LYMPHOCYTES NFR BLD AUTO: 23.5 %
MAGNESIUM SERPL-MCNC: 1.09 MG/DL (ref 1.6–2.4)
MCH RBC QN AUTO: 30.4 PG (ref 26–34)
MCHC RBC AUTO-ENTMCNC: 34.6 G/DL (ref 32–36)
MCV RBC AUTO: 88 FL (ref 80–100)
MONOCYTES # BLD AUTO: 0.71 X10*3/UL (ref 0.05–0.8)
MONOCYTES NFR BLD AUTO: 7 %
NEUTROPHILS # BLD AUTO: 6.77 X10*3/UL (ref 1.6–5.5)
NEUTROPHILS NFR BLD AUTO: 67 %
NITRITE UR QL STRIP.AUTO: NEGATIVE
NRBC BLD-RTO: 0 /100 WBCS (ref 0–0)
OXYHGB MFR BLDV: 60.8 % (ref 45–75)
PCO2 BLDV: 40 MM HG (ref 41–51)
PH BLDV: 7.46 PH (ref 7.33–7.43)
PH UR STRIP.AUTO: 6.5 [PH]
PLATELET # BLD AUTO: 103 X10*3/UL (ref 150–450)
PO2 BLDV: 35 MM HG (ref 35–45)
POTASSIUM BLDV-SCNC: 3.5 MMOL/L (ref 3.5–5.3)
POTASSIUM SERPL-SCNC: 3.6 MMOL/L (ref 3.5–5.3)
PROT SERPL-MCNC: 7.7 G/DL (ref 6.4–8.2)
PROT UR STRIP.AUTO-MCNC: NEGATIVE MG/DL
RBC # BLD AUTO: 4.37 X10*6/UL (ref 4–5.2)
RBC # UR STRIP.AUTO: NEGATIVE MG/DL
RBC #/AREA URNS AUTO: ABNORMAL /HPF
SAO2 % BLDV: 62 % (ref 45–75)
SODIUM BLDV-SCNC: 129 MMOL/L (ref 136–145)
SODIUM SERPL-SCNC: 135 MMOL/L (ref 136–145)
SP GR UR STRIP.AUTO: 1.01
UROBILINOGEN UR STRIP.AUTO-MCNC: NORMAL MG/DL
WBC # BLD AUTO: 10.1 X10*3/UL (ref 4.4–11.3)
WBC #/AREA URNS AUTO: ABNORMAL /HPF

## 2025-04-13 PROCEDURE — 82947 ASSAY GLUCOSE BLOOD QUANT: CPT

## 2025-04-13 PROCEDURE — 85025 COMPLETE CBC W/AUTO DIFF WBC: CPT

## 2025-04-13 PROCEDURE — 1100000001 HC PRIVATE ROOM DAILY

## 2025-04-13 PROCEDURE — 2500000001 HC RX 250 WO HCPCS SELF ADMINISTERED DRUGS (ALT 637 FOR MEDICARE OP): Performed by: FAMILY MEDICINE

## 2025-04-13 PROCEDURE — 70450 CT HEAD/BRAIN W/O DYE: CPT

## 2025-04-13 PROCEDURE — 93005 ELECTROCARDIOGRAM TRACING: CPT

## 2025-04-13 PROCEDURE — 96361 HYDRATE IV INFUSION ADD-ON: CPT

## 2025-04-13 PROCEDURE — 83880 ASSAY OF NATRIURETIC PEPTIDE: CPT

## 2025-04-13 PROCEDURE — 84484 ASSAY OF TROPONIN QUANT: CPT

## 2025-04-13 PROCEDURE — 83690 ASSAY OF LIPASE: CPT

## 2025-04-13 PROCEDURE — 2500000004 HC RX 250 GENERAL PHARMACY W/ HCPCS (ALT 636 FOR OP/ED): Performed by: FAMILY MEDICINE

## 2025-04-13 PROCEDURE — 36415 COLL VENOUS BLD VENIPUNCTURE: CPT

## 2025-04-13 PROCEDURE — 99222 1ST HOSP IP/OBS MODERATE 55: CPT | Performed by: FAMILY MEDICINE

## 2025-04-13 PROCEDURE — 99285 EMERGENCY DEPT VISIT HI MDM: CPT | Mod: 25 | Performed by: STUDENT IN AN ORGANIZED HEALTH CARE EDUCATION/TRAINING PROGRAM

## 2025-04-13 PROCEDURE — 82810 BLOOD GASES O2 SAT ONLY: CPT

## 2025-04-13 PROCEDURE — 81001 URINALYSIS AUTO W/SCOPE: CPT

## 2025-04-13 PROCEDURE — 87086 URINE CULTURE/COLONY COUNT: CPT | Mod: GEALAB

## 2025-04-13 PROCEDURE — 36415 COLL VENOUS BLD VENIPUNCTURE: CPT | Performed by: STUDENT IN AN ORGANIZED HEALTH CARE EDUCATION/TRAINING PROGRAM

## 2025-04-13 PROCEDURE — 71045 X-RAY EXAM CHEST 1 VIEW: CPT

## 2025-04-13 PROCEDURE — 71045 X-RAY EXAM CHEST 1 VIEW: CPT | Mod: FOREIGN READ | Performed by: RADIOLOGY

## 2025-04-13 PROCEDURE — 2500000004 HC RX 250 GENERAL PHARMACY W/ HCPCS (ALT 636 FOR OP/ED)

## 2025-04-13 PROCEDURE — 84484 ASSAY OF TROPONIN QUANT: CPT | Performed by: STUDENT IN AN ORGANIZED HEALTH CARE EDUCATION/TRAINING PROGRAM

## 2025-04-13 PROCEDURE — 83880 ASSAY OF NATRIURETIC PEPTIDE: CPT | Performed by: STUDENT IN AN ORGANIZED HEALTH CARE EDUCATION/TRAINING PROGRAM

## 2025-04-13 PROCEDURE — 83690 ASSAY OF LIPASE: CPT | Performed by: STUDENT IN AN ORGANIZED HEALTH CARE EDUCATION/TRAINING PROGRAM

## 2025-04-13 PROCEDURE — 83605 ASSAY OF LACTIC ACID: CPT | Performed by: FAMILY MEDICINE

## 2025-04-13 PROCEDURE — 83735 ASSAY OF MAGNESIUM: CPT

## 2025-04-13 PROCEDURE — 2500000002 HC RX 250 W HCPCS SELF ADMINISTERED DRUGS (ALT 637 FOR MEDICARE OP, ALT 636 FOR OP/ED): Performed by: FAMILY MEDICINE

## 2025-04-13 PROCEDURE — 96374 THER/PROPH/DIAG INJ IV PUSH: CPT

## 2025-04-13 PROCEDURE — 82310 ASSAY OF CALCIUM: CPT

## 2025-04-13 PROCEDURE — 83605 ASSAY OF LACTIC ACID: CPT

## 2025-04-13 PROCEDURE — 70450 CT HEAD/BRAIN W/O DYE: CPT | Performed by: RADIOLOGY

## 2025-04-13 RX ORDER — ACETAMINOPHEN 160 MG/5ML
650 SOLUTION ORAL EVERY 6 HOURS PRN
Status: DISCONTINUED | OUTPATIENT
Start: 2025-04-13 | End: 2025-04-17 | Stop reason: HOSPADM

## 2025-04-13 RX ORDER — ONDANSETRON HYDROCHLORIDE 2 MG/ML
4 INJECTION, SOLUTION INTRAVENOUS EVERY 8 HOURS PRN
Status: DISCONTINUED | OUTPATIENT
Start: 2025-04-13 | End: 2025-04-17 | Stop reason: HOSPADM

## 2025-04-13 RX ORDER — ATENOLOL 50 MG/1
50 TABLET ORAL DAILY
Status: DISCONTINUED | OUTPATIENT
Start: 2025-04-13 | End: 2025-04-17 | Stop reason: HOSPADM

## 2025-04-13 RX ORDER — MAGNESIUM SULFATE HEPTAHYDRATE 40 MG/ML
2 INJECTION, SOLUTION INTRAVENOUS ONCE
Status: COMPLETED | OUTPATIENT
Start: 2025-04-13 | End: 2025-04-13

## 2025-04-13 RX ORDER — TALC
3 POWDER (GRAM) TOPICAL NIGHTLY PRN
Status: DISCONTINUED | OUTPATIENT
Start: 2025-04-13 | End: 2025-04-17 | Stop reason: HOSPADM

## 2025-04-13 RX ORDER — POTASSIUM CHLORIDE 20 MEQ/1
20 TABLET, EXTENDED RELEASE ORAL DAILY
Status: DISCONTINUED | OUTPATIENT
Start: 2025-04-13 | End: 2025-04-17 | Stop reason: HOSPADM

## 2025-04-13 RX ORDER — INSULIN LISPRO 100 [IU]/ML
0-5 INJECTION, SOLUTION INTRAVENOUS; SUBCUTANEOUS
Status: DISCONTINUED | OUTPATIENT
Start: 2025-04-13 | End: 2025-04-17 | Stop reason: HOSPADM

## 2025-04-13 RX ORDER — ALUMINUM HYDROXIDE, MAGNESIUM HYDROXIDE, AND SIMETHICONE 1200; 120; 1200 MG/30ML; MG/30ML; MG/30ML
30 SUSPENSION ORAL EVERY 6 HOURS PRN
Status: DISCONTINUED | OUTPATIENT
Start: 2025-04-13 | End: 2025-04-17 | Stop reason: HOSPADM

## 2025-04-13 RX ORDER — SODIUM CHLORIDE 9 MG/ML
75 INJECTION, SOLUTION INTRAVENOUS CONTINUOUS
Status: ACTIVE | OUTPATIENT
Start: 2025-04-13 | End: 2025-04-14

## 2025-04-13 RX ORDER — ACETAMINOPHEN 650 MG/1
650 SUPPOSITORY RECTAL EVERY 6 HOURS PRN
Status: DISCONTINUED | OUTPATIENT
Start: 2025-04-13 | End: 2025-04-16

## 2025-04-13 RX ORDER — ENOXAPARIN SODIUM 100 MG/ML
40 INJECTION SUBCUTANEOUS EVERY 24 HOURS
Status: DISCONTINUED | OUTPATIENT
Start: 2025-04-13 | End: 2025-04-17 | Stop reason: HOSPADM

## 2025-04-13 RX ORDER — ONDANSETRON 4 MG/1
4 TABLET, ORALLY DISINTEGRATING ORAL EVERY 8 HOURS PRN
Status: DISCONTINUED | OUTPATIENT
Start: 2025-04-13 | End: 2025-04-17 | Stop reason: HOSPADM

## 2025-04-13 RX ORDER — MIRTAZAPINE 15 MG/1
15 TABLET, FILM COATED ORAL NIGHTLY
Status: DISCONTINUED | OUTPATIENT
Start: 2025-04-13 | End: 2025-04-17 | Stop reason: HOSPADM

## 2025-04-13 RX ORDER — DOCUSATE SODIUM 100 MG/1
100 CAPSULE, LIQUID FILLED ORAL 2 TIMES DAILY
Status: DISCONTINUED | OUTPATIENT
Start: 2025-04-13 | End: 2025-04-17 | Stop reason: HOSPADM

## 2025-04-13 RX ORDER — ACETAMINOPHEN 325 MG/1
650 TABLET ORAL EVERY 6 HOURS PRN
Status: DISCONTINUED | OUTPATIENT
Start: 2025-04-13 | End: 2025-04-17 | Stop reason: HOSPADM

## 2025-04-13 RX ORDER — ATORVASTATIN CALCIUM 40 MG/1
40 TABLET, FILM COATED ORAL DAILY
Status: DISCONTINUED | OUTPATIENT
Start: 2025-04-13 | End: 2025-04-17 | Stop reason: HOSPADM

## 2025-04-13 RX ORDER — DONEPEZIL HYDROCHLORIDE 5 MG/1
10 TABLET, FILM COATED ORAL NIGHTLY
Status: DISCONTINUED | OUTPATIENT
Start: 2025-04-13 | End: 2025-04-17 | Stop reason: HOSPADM

## 2025-04-13 RX ADMIN — DONEPEZIL HYDROCHLORIDE 10 MG: 5 TABLET ORAL at 22:06

## 2025-04-13 RX ADMIN — MIRTAZAPINE 15 MG: 15 TABLET, FILM COATED ORAL at 22:06

## 2025-04-13 RX ADMIN — POTASSIUM CHLORIDE 20 MEQ: 1500 TABLET, EXTENDED RELEASE ORAL at 16:45

## 2025-04-13 RX ADMIN — DOCUSATE SODIUM 100 MG: 100 CAPSULE, LIQUID FILLED ORAL at 22:05

## 2025-04-13 RX ADMIN — ENOXAPARIN SODIUM 40 MG: 40 INJECTION SUBCUTANEOUS at 16:45

## 2025-04-13 RX ADMIN — SODIUM CHLORIDE 75 ML/HR: 9 INJECTION, SOLUTION INTRAVENOUS at 15:04

## 2025-04-13 RX ADMIN — MAGNESIUM SULFATE HEPTAHYDRATE 2 G: 40 INJECTION, SOLUTION INTRAVENOUS at 12:43

## 2025-04-13 RX ADMIN — SODIUM CHLORIDE, SODIUM LACTATE, POTASSIUM CHLORIDE, AND CALCIUM CHLORIDE 1000 ML: .6; .31; .03; .02 INJECTION, SOLUTION INTRAVENOUS at 11:22

## 2025-04-13 RX ADMIN — SODIUM CHLORIDE 75 ML/HR: 9 INJECTION, SOLUTION INTRAVENOUS at 21:52

## 2025-04-13 RX ADMIN — ATORVASTATIN CALCIUM 40 MG: 40 TABLET, FILM COATED ORAL at 16:45

## 2025-04-13 SDOH — SOCIAL STABILITY: SOCIAL INSECURITY: DOES ANYONE TRY TO KEEP YOU FROM HAVING/CONTACTING OTHER FRIENDS OR DOING THINGS OUTSIDE YOUR HOME?: UNABLE TO ASSESS

## 2025-04-13 SDOH — SOCIAL STABILITY: SOCIAL INSECURITY
WITHIN THE LAST YEAR, HAVE YOU BEEN RAPED OR FORCED TO HAVE ANY KIND OF SEXUAL ACTIVITY BY YOUR PARTNER OR EX-PARTNER?: NO

## 2025-04-13 SDOH — HEALTH STABILITY: MENTAL HEALTH: HOW MANY DRINKS CONTAINING ALCOHOL DO YOU HAVE ON A TYPICAL DAY WHEN YOU ARE DRINKING?: PATIENT DOES NOT DRINK

## 2025-04-13 SDOH — HEALTH STABILITY: MENTAL HEALTH: HOW OFTEN DO YOU HAVE A DRINK CONTAINING ALCOHOL?: NEVER

## 2025-04-13 SDOH — SOCIAL STABILITY: SOCIAL INSECURITY: ARE THERE ANY APPARENT SIGNS OF INJURIES/BEHAVIORS THAT COULD BE RELATED TO ABUSE/NEGLECT?: NO

## 2025-04-13 SDOH — SOCIAL STABILITY: SOCIAL INSECURITY
WITHIN THE LAST YEAR, HAVE YOU BEEN KICKED, HIT, SLAPPED, OR OTHERWISE PHYSICALLY HURT BY YOUR PARTNER OR EX-PARTNER?: NO

## 2025-04-13 SDOH — ECONOMIC STABILITY: INCOME INSECURITY: IN THE PAST 12 MONTHS HAS THE ELECTRIC, GAS, OIL, OR WATER COMPANY THREATENED TO SHUT OFF SERVICES IN YOUR HOME?: NO

## 2025-04-13 SDOH — SOCIAL STABILITY: SOCIAL INSECURITY: WITHIN THE LAST YEAR, HAVE YOU BEEN AFRAID OF YOUR PARTNER OR EX-PARTNER?: NO

## 2025-04-13 SDOH — HEALTH STABILITY: MENTAL HEALTH: HOW OFTEN DO YOU HAVE SIX OR MORE DRINKS ON ONE OCCASION?: NEVER

## 2025-04-13 SDOH — SOCIAL STABILITY: SOCIAL INSECURITY: HAS ANYONE EVER THREATENED TO HURT YOUR FAMILY OR YOUR PETS?: UNABLE TO ASSESS

## 2025-04-13 SDOH — SOCIAL STABILITY: SOCIAL INSECURITY: ABUSE: ADULT

## 2025-04-13 SDOH — ECONOMIC STABILITY: FOOD INSECURITY: WITHIN THE PAST 12 MONTHS, YOU WORRIED THAT YOUR FOOD WOULD RUN OUT BEFORE YOU GOT THE MONEY TO BUY MORE.: NEVER TRUE

## 2025-04-13 SDOH — ECONOMIC STABILITY: FOOD INSECURITY: WITHIN THE PAST 12 MONTHS, THE FOOD YOU BOUGHT JUST DIDN'T LAST AND YOU DIDN'T HAVE MONEY TO GET MORE.: NEVER TRUE

## 2025-04-13 SDOH — SOCIAL STABILITY: SOCIAL INSECURITY: WITHIN THE LAST YEAR, HAVE YOU BEEN HUMILIATED OR EMOTIONALLY ABUSED IN OTHER WAYS BY YOUR PARTNER OR EX-PARTNER?: NO

## 2025-04-13 SDOH — SOCIAL STABILITY: SOCIAL INSECURITY: WERE YOU ABLE TO COMPLETE ALL THE BEHAVIORAL HEALTH SCREENINGS?: YES

## 2025-04-13 SDOH — SOCIAL STABILITY: SOCIAL INSECURITY: ARE YOU OR HAVE YOU BEEN THREATENED OR ABUSED PHYSICALLY, EMOTIONALLY, OR SEXUALLY BY ANYONE?: UNABLE TO ASSESS

## 2025-04-13 SDOH — SOCIAL STABILITY: SOCIAL INSECURITY: HAVE YOU HAD THOUGHTS OF HARMING ANYONE ELSE?: UNABLE TO ASSESS

## 2025-04-13 SDOH — SOCIAL STABILITY: SOCIAL INSECURITY: HAVE YOU HAD ANY THOUGHTS OF HARMING ANYONE ELSE?: UNABLE TO ASSESS

## 2025-04-13 SDOH — SOCIAL STABILITY: SOCIAL INSECURITY: DO YOU FEEL ANYONE HAS EXPLOITED OR TAKEN ADVANTAGE OF YOU FINANCIALLY OR OF YOUR PERSONAL PROPERTY?: UNABLE TO ASSESS

## 2025-04-13 SDOH — SOCIAL STABILITY: SOCIAL INSECURITY: DO YOU FEEL UNSAFE GOING BACK TO THE PLACE WHERE YOU ARE LIVING?: UNABLE TO ASSESS

## 2025-04-13 ASSESSMENT — PATIENT HEALTH QUESTIONNAIRE - PHQ9
2. FEELING DOWN, DEPRESSED OR HOPELESS: NOT AT ALL
SUM OF ALL RESPONSES TO PHQ9 QUESTIONS 1 & 2: 0
1. LITTLE INTEREST OR PLEASURE IN DOING THINGS: NOT AT ALL

## 2025-04-13 ASSESSMENT — ACTIVITIES OF DAILY LIVING (ADL)
ASSISTIVE_DEVICE: WALKER
BATHING: NEEDS ASSISTANCE
ADEQUATE_TO_COMPLETE_ADL: YES
PATIENT'S MEMORY ADEQUATE TO SAFELY COMPLETE DAILY ACTIVITIES?: NO
FEEDING YOURSELF: NEEDS ASSISTANCE
HEARING - LEFT EAR: FUNCTIONAL
WALKS IN HOME: NEEDS ASSISTANCE
TOILETING: NEEDS ASSISTANCE
JUDGMENT_ADEQUATE_SAFELY_COMPLETE_DAILY_ACTIVITIES: NO
HEARING - RIGHT EAR: FUNCTIONAL
LACK_OF_TRANSPORTATION: NO
DRESSING YOURSELF: NEEDS ASSISTANCE
GROOMING: NEEDS ASSISTANCE

## 2025-04-13 ASSESSMENT — ENCOUNTER SYMPTOMS
BACK PAIN: 0
NAUSEA: 0
DYSURIA: 0
CHEST TIGHTNESS: 0
VOMITING: 0
APPETITE CHANGE: 1
ABDOMINAL PAIN: 0
FEVER: 0
DIZZINESS: 0
HEADACHES: 0
CHILLS: 0
CONSTIPATION: 0
PALPITATIONS: 0
DIARRHEA: 0

## 2025-04-13 ASSESSMENT — PAIN SCALES - GENERAL
PAINLEVEL_OUTOF10: 0 - NO PAIN
PAINLEVEL_OUTOF10: 0 - NO PAIN

## 2025-04-13 ASSESSMENT — COGNITIVE AND FUNCTIONAL STATUS - GENERAL
CLIMB 3 TO 5 STEPS WITH RAILING: A LITTLE
TOILETING: A LITTLE
WALKING IN HOSPITAL ROOM: A LITTLE
PERSONAL GROOMING: A LITTLE
DAILY ACTIVITIY SCORE: 17
STANDING UP FROM CHAIR USING ARMS: A LOT
TOILETING: A LOT
PERSONAL GROOMING: A LITTLE
DAILY ACTIVITIY SCORE: 18
STANDING UP FROM CHAIR USING ARMS: A LITTLE
MOBILITY SCORE: 14
EATING MEALS: A LITTLE
PATIENT BASELINE BEDBOUND: NO
MOVING TO AND FROM BED TO CHAIR: A LOT
DRESSING REGULAR UPPER BODY CLOTHING: A LITTLE
HELP NEEDED FOR BATHING: A LITTLE
MOVING FROM LYING ON BACK TO SITTING ON SIDE OF FLAT BED WITH BEDRAILS: A LITTLE
MOBILITY SCORE: 18
HELP NEEDED FOR BATHING: A LITTLE
EATING MEALS: A LITTLE
TURNING FROM BACK TO SIDE WHILE IN FLAT BAD: A LITTLE
CLIMB 3 TO 5 STEPS WITH RAILING: A LOT
MOVING TO AND FROM BED TO CHAIR: A LITTLE
MOVING FROM LYING ON BACK TO SITTING ON SIDE OF FLAT BED WITH BEDRAILS: A LITTLE
DRESSING REGULAR LOWER BODY CLOTHING: A LITTLE
DRESSING REGULAR LOWER BODY CLOTHING: A LITTLE
DRESSING REGULAR UPPER BODY CLOTHING: A LITTLE
WALKING IN HOSPITAL ROOM: A LOT
TURNING FROM BACK TO SIDE WHILE IN FLAT BAD: A LITTLE

## 2025-04-13 ASSESSMENT — LIFESTYLE VARIABLES
AUDIT-C TOTAL SCORE: 0
SKIP TO QUESTIONS 9-10: 1

## 2025-04-13 ASSESSMENT — PAIN - FUNCTIONAL ASSESSMENT
PAIN_FUNCTIONAL_ASSESSMENT: 0-10
PAIN_FUNCTIONAL_ASSESSMENT: 0-10

## 2025-04-13 NOTE — ED PROVIDER NOTES
HPI   Chief Complaint   Patient presents with    Hyperglycemia    Tremors       Patient is a 80-year-old female with significant history of type 2 diabetes, hypertension, dementia presents to the ED for high glucose and shaking.  Patient lives at home with her .  Has been checked blood sugar this morning it was over 300.  Patient's sugar has been elevated the last couple weeks.  Patient is taking medications compliantly.  Patient ROS is limited due to mental status.  Patient's  states she has had diarrhea once every day no recent travel or antibiotics.  Describes as mushy and brown.  Patient denies any abdominal pain chest pain shortness of breath numbness tingling URI symptoms fever or chills.  Patient states she felt sick last night.  Patient has not had any nausea or vomiting.  Patient has had decreased p.o. intake has not been able to get around by herself which is not her baseline.  And tremors/taking in her body started happening last night.  Denies any dysuria.  Patient denies any tobacco alcohol or street drug abuse.              Patient History   Past Medical History:   Diagnosis Date    Elevated troponin I level 08/15/2024    Essential (primary) hypertension 12/29/2022    HTN (hypertension), benign    Insect bite (nonvenomous) of scalp, initial encounter (CODE) 06/05/2017    Tick bite of scalp, initial encounter    Listeriosis, unspecified 05/23/2016    Listeria infection    Other conditions influencing health status 01/29/2018    Normal colonoscopy    Pyelonephritis of left kidney 04/11/2023    Type 2 diabetes mellitus with other specified complication 12/29/2022    DM type 2 with diabetic mixed hyperlipidemia    Unilateral primary osteoarthritis, right hip 12/09/2019    Primary osteoarthritis of right hip    Vitamin D deficiency, unspecified     Vitamin D deficiency     Past Surgical History:   Procedure Laterality Date    OTHER SURGICAL HISTORY  09/30/2019    Tonsillectomy with  adenoidectomy     Family History   Problem Relation Name Age of Onset    Stroke Mother      Heart attack Mother      Hypertension Father       Social History     Tobacco Use    Smoking status: Never    Smokeless tobacco: Never   Vaping Use    Vaping status: Never Used   Substance Use Topics    Alcohol use: Yes     Alcohol/week: 1.0 standard drink of alcohol     Types: 1 Standard drinks or equivalent per week     Comment: Socially    Drug use: Never       Physical Exam   ED Triage Vitals [04/13/25 1121]   Temperature Heart Rate Respirations BP   36.2 °C (97.2 °F) 87 16 147/87      Pulse Ox Temp src Heart Rate Source Patient Position   95 % -- -- --      BP Location FiO2 (%)     -- --       Physical Exam  HENT:      Head: Normocephalic.   Cardiovascular:      Rate and Rhythm: Normal rate and regular rhythm.      Pulses: Normal pulses.   Pulmonary:      Effort: Pulmonary effort is normal.      Breath sounds: No wheezing, rhonchi or rales.   Abdominal:      Palpations: Abdomen is soft.      Tenderness: There is no abdominal tenderness. There is no guarding or rebound.   Neurological:      General: No focal deficit present.      Mental Status: She is alert. Mental status is at baseline.           ED Course & MDM   ED Course as of 04/13/25 1304   Sun Apr 13, 2025   1239 EKG interpretation performed at 1223 A-fib with competing junctional pacemaker normal axis no STEMI.  Ventricular 82 bpm []      ED Course User Index  [] Prerna Brown PA-C         Diagnoses as of 04/13/25 1304   Hypomagnesemia   Diarrhea, unspecified type   Weakness                 No data recorded                                 Medical Decision Making  Medical Decision Making:  Patient presented as described in HPI. Patient case including ROS, PE, and treatment and plan discussed with ED attending if attached as cosigner. Due to patients presentation orders completed include as documented.  Patient presents to the ED for increased fatigue  weakness decreased p.o. intake.  Also state has had high blood pressure last couple weeks and some shaking.  Patient is nontoxic-appearing abdomen is soft nontender lung sounds are clear.  No peripheral edema.  No obvious tremors on exam.  No neurofocal deficits.  At baseline.  Pending labs and imaging.  Hypomagnesia and 1.06 given magnesium replacement.  Kidney function similar to previous.  Potassium is normal.  No leukocytosis.  X-ray of the chest is negative CT head is negative.  Educated on these findings.  Patient will need admission for the hypomag.  Patient lives with her  who has Parkinson's patient may need placement.  Spoke to the hospitalist accepts the patient.  Patient leann stable pending admission.          Patient care discussed with: N/A  Social Determinants affecting care: N/A    Final diagnosis and disposition as below.  See CI    Hospitalize. I discussed the differential; results and admit plan with the patient and/or family/friend/caregiver if present.  I emphasized the importance of hospitalization need for re-evaluation/continued monitoring/interventions.. They agreed that if they feel their condition is worsening or if they have any other concern they should alert staff immediately for further assistance. I gave the patient an opportunity to ask all questions they had and answered all of them accordingly. The patient and/or family/friend/caregiver expressed understanding verbally and that they would comply.       Disposition:  admit    Hospitalize. Discussed findings and treatment done here in ED with admitting physician. It would be a risk to discharge the patient in their condition due to possibility of deterioration in their condition and the need for urgent interventions.    This note has been transcribed using voice recognition and may contain grammatical errors, misplaced words, incorrect words, incorrect phrases or other errors.        Labs Reviewed   CBC WITH AUTO DIFFERENTIAL  - Abnormal       Result Value    WBC 10.1      nRBC 0.0      RBC 4.37      Hemoglobin 13.3      Hematocrit 38.4      MCV 88      MCH 30.4      MCHC 34.6      RDW 13.2      Platelets 103 (*)     Neutrophils % 67.0      Immature Granulocytes %, Automated 0.2      Lymphocytes % 23.5      Monocytes % 7.0      Eosinophils % 2.0      Basophils % 0.3      Neutrophils Absolute 6.77 (*)     Immature Granulocytes Absolute, Automated 0.02      Lymphocytes Absolute 2.37      Monocytes Absolute 0.71      Eosinophils Absolute 0.20      Basophils Absolute 0.03     COMPREHENSIVE METABOLIC PANEL - Abnormal    Glucose 198 (*)     Sodium 135 (*)     Potassium 3.6      Chloride 94 (*)     Bicarbonate 29      Anion Gap 16      Urea Nitrogen 24 (*)     Creatinine 1.07 (*)     eGFR 53 (*)     Calcium 8.9      Albumin 4.2      Alkaline Phosphatase 123      Total Protein 7.7      AST 18      Bilirubin, Total 0.7      ALT 14     MAGNESIUM - Abnormal    Magnesium 1.09 (*)    BLOOD GAS VENOUS FULL PANEL - Abnormal    POCT pH, Venous 7.46 (*)     POCT pCO2, Venous 40 (*)     POCT pO2, Venous 35      POCT SO2, Venous 62      POCT Oxy Hemoglobin, Venous 60.8      POCT Hematocrit Calculated, Venous 40.0      POCT Sodium, Venous 129 (*)     POCT Potassium, Venous 3.5      POCT Chloride, Venous 99      POCT Ionized Calicum, Venous 1.18      POCT Glucose, Venous 206 (*)     POCT Lactate, Venous 3.7 (*)     POCT Base Excess, Venous 4.2 (*)     POCT HCO3 Calculated, Venous 28.4 (*)     POCT Hemoglobin, Venous 13.4      POCT Anion Gap, Venous 5.0 (*)     Patient Temperature        FiO2 21     POCT GLUCOSE - Abnormal    POCT Glucose 193 (*)    B-TYPE NATRIURETIC PEPTIDE - Normal    BNP 36      Narrative:        <100 pg/mL - Heart failure unlikely  100-299 pg/mL - Intermediate probability of acute heart                  failure exacerbation. Correlate with clinical                  context and patient history.    >=300 pg/mL - Heart Failure likely.  Correlate with clinical                  context and patient history.    BNP testing is performed using different testing methodology at [facility] than at other [facility]. Direct result comparisons should only be made within the same method.      LIPASE - Normal    Lipase 49      Narrative:     Venipuncture immediately after or during the administration of Metamizole may lead to falsely low results. Testing should be performed immediately prior to Metamizole dosing.   SERIAL TROPONIN-INITIAL - Normal    Troponin I, High Sensitivity 4      Narrative:     Less than 99th percentile of normal range cutoff-  Female and children under 18 years old <14 ng/L; Male <21 ng/L: Negative  Repeat testing should be performed if clinically indicated.     Female and children under 18 years old 14-50 ng/L; Male 21-50 ng/L:  Consistent with possible cardiac damage and possible increased clinical   risk. Serial measurements may help to assess extent of myocardial damage.     >50 ng/L: Consistent with cardiac damage, increased clinical risk and  myocardial infarction. Serial measurements may help assess extent of   myocardial damage.      NOTE: Children less than 1 year old may have higher baseline troponin   levels and results should be interpreted in conjunction with the overall   clinical context.     NOTE: Troponin I testing is performed using a different   testing methodology at [facility] than at other   [facility]. Direct result comparisons should only   be made within the same method.   STOOL PATHOGEN PANEL, PCR   C. DIFFICILE, PCR   URINALYSIS WITH REFLEX CULTURE AND MICROSCOPIC    Narrative:     The following orders were created for panel order Urinalysis with Reflex Culture and Microscopic.  Procedure                               Abnormality         Status                     ---------                               -----------         ------                     Urinalysis with Reflex  C...[[id number]]                                                 Extra Urine Gray Tube[[id number]]                                                         Please view results for these tests on the individual orders.   URINALYSIS WITH REFLEX CULTURE AND MICROSCOPIC   EXTRA URINE GRAY TUBE   BLOOD GAS LACTIC ACID, VENOUS   TROPONIN SERIES- (INITIAL, 1 HR)    Narrative:     The following orders were created for panel order Troponin I Series, High Sensitivity (0, 1 HR).  Procedure                               Abnormality         Status                     ---------                               -----------         ------                     Troponin I, High Sensiti...[[id number]]  Normal              Final result               Troponin, High Sensitivi...[[id number]]                      In process                   Please view results for these tests on the individual orders.   SERIAL TROPONIN, 1 HOUR   POCT GLUCOSE METER      XR chest 1 view   Final Result   No radiographic evidence of acute cardiopulmonary disease.   Signed by Jose Angel Elias MD      CT head wo IV contrast   Final Result   Mild age related degenerative change as described without acute   findings or significant change from the prior exam.        Signed by: José Miguel Evans 4/13/2025 12:28 PM   Dictation workstation:   [id number]         Procedure  Procedures     Prerna Brown PA-C  04/13/25 1305

## 2025-04-13 NOTE — ED TRIAGE NOTES
Pt presents in a wheelchair via POV through triage from home for c/o high blood sugar and shaking/twitching. Pt has her  and 2 other family members with her, family states pt lives with her  who has Parkinsons and they are concerned that both of them might need a facility. Per family pt has been shaking since last night, but her  did not check her blood sugar until this AM and it was over 300. Call light within reach.

## 2025-04-13 NOTE — H&P
History Of Present Illness  Inge Clark is a 80 y.o. female with PMH of NIDDM-type II, vascular dementia, HTN, HLD, and recent dysphagia that presented to the ED with poor PO intake and concern of hyperglycemia. Patient is a poor historian so history was obtained from  and son. Per son, patient was been having some swallowing problems and has therefore not been eating as well. Her  was cutting up her food but even then she was having difficulty swallowing so they have been drinking a lot of sweet tea and juice instead. Per son, patient recently saw her PCP and got a new glucose monitor which gave some readings in the 300s but she has been compliant with all meds. Per , patient has become weak and it is becoming difficult to take care of her, as he has Parkinsons disease. Patient and family are requesting therapy evaluations for possible rehab placement. Of note patient had single episode of diarrhea for the past few days but also had soft Bms as well. Denies any fever/chills, chest pain, dyspnea N/V, abdominal pain, and changes in urinary habits.     In the ED, vital signs were within normal limits. Laboratory analysis revealed abnormal kidney function, hypomagnesemia, and elevated lactate. CXR and CT head were both negative for acute pathology. Patient was treated with IV fluids, magnesium repletion, and recommended for admission for further treatment.      Past Medical History  She has a past medical history of Elevated troponin I level (08/15/2024), Essential (primary) hypertension (12/29/2022), Insect bite (nonvenomous) of scalp, initial encounter (CODE) (06/05/2017), Listeriosis, unspecified (05/23/2016), Other conditions influencing health status (01/29/2018), Pyelonephritis of left kidney (04/11/2023), Type 2 diabetes mellitus with other specified complication (12/29/2022), Unilateral primary osteoarthritis, right hip (12/09/2019), and Vitamin D deficiency, unspecified.    Surgical  History  Hip surgery     Social History  She reports that she has never smoked. She has never used smokeless tobacco. She reports current alcohol use of about 1.0 standard drink of alcohol per week. She reports that she does not use drugs.    Family History  Family History   Problem Relation Name Age of Onset    Stroke Mother      Heart attack Mother      Hypertension Father          Allergies  Grass pollen    Review of Systems   Constitutional:  Positive for appetite change. Negative for chills and fever.   HENT:  Negative for congestion.    Respiratory:  Negative for chest tightness.    Cardiovascular:  Negative for chest pain and palpitations.   Gastrointestinal:  Negative for abdominal pain, constipation, diarrhea, nausea and vomiting.   Genitourinary:  Negative for dysuria.   Musculoskeletal:  Negative for back pain.   Skin:  Negative for rash.   Neurological:  Negative for dizziness and headaches.        Physical Exam  Constitutional: alert and oriented x1-2 at baseline, awake, cooperative, no acute distress  Skin: warm and dry  Head/Neck: Normocephalic, atraumatic  Eyes: clear sclera  ENMT: mucous membranes dry  Cardio: Regular rate and rhythm, no murmur  Resp: CTA bilaterally, good respiratory effort  Gastrointestinal: Soft, nontender, nondistended, BSx4  Musculoskeletal: ROM intact, no joint swelling  Extremities: No edema, cyanosis, or clubbing  Neuro: alert and oriented x1-2, no neuro deficits  Psychological: Appropriate mood and behavior     Last Recorded Vitals  /62   Pulse 72   Temp 36.2 °C (97.2 °F)   Resp 16   Wt 59.1 kg (130 lb 3.2 oz)   SpO2 96%     Relevant Results  Scheduled medications  atorvastatin, 40 mg, oral, Daily  docusate sodium, 100 mg, oral, BID  donepezil, 10 mg, oral, Nightly  enoxaparin, 40 mg, subcutaneous, q24h  insulin lispro, 0-5 Units, subcutaneous, TID AC  mirtazapine, 15 mg, oral, Nightly  potassium chloride CR, 20 mEq, oral, Daily      Continuous  medications  sodium chloride 0.9%, 75 mL/hr, Last Rate: 75 mL/hr (04/13/25 1504)      PRN medications  PRN medications: acetaminophen **OR** acetaminophen **OR** acetaminophen, alum-mag hydroxide-simeth, melatonin, ondansetron ODT **OR** ondansetron    Results for orders placed or performed during the hospital encounter of 04/13/25 (from the past 24 hours)   CBC and Auto Differential   Result Value Ref Range    WBC 10.1 4.4 - 11.3 x10*3/uL    nRBC 0.0 0.0 - 0.0 /100 WBCs    RBC 4.37 4.00 - 5.20 x10*6/uL    Hemoglobin 13.3 12.0 - 16.0 g/dL    Hematocrit 38.4 36.0 - 46.0 %    MCV 88 80 - 100 fL    MCH 30.4 26.0 - 34.0 pg    MCHC 34.6 32.0 - 36.0 g/dL    RDW 13.2 11.5 - 14.5 %    Platelets 103 (L) 150 - 450 x10*3/uL    Neutrophils % 67.0 40.0 - 80.0 %    Immature Granulocytes %, Automated 0.2 0.0 - 0.9 %    Lymphocytes % 23.5 13.0 - 44.0 %    Monocytes % 7.0 2.0 - 10.0 %    Eosinophils % 2.0 0.0 - 6.0 %    Basophils % 0.3 0.0 - 2.0 %    Neutrophils Absolute 6.77 (H) 1.60 - 5.50 x10*3/uL    Immature Granulocytes Absolute, Automated 0.02 0.00 - 0.50 x10*3/uL    Lymphocytes Absolute 2.37 0.80 - 3.00 x10*3/uL    Monocytes Absolute 0.71 0.05 - 0.80 x10*3/uL    Eosinophils Absolute 0.20 0.00 - 0.40 x10*3/uL    Basophils Absolute 0.03 0.00 - 0.10 x10*3/uL   Comprehensive metabolic panel   Result Value Ref Range    Glucose 198 (H) 74 - 99 mg/dL    Sodium 135 (L) 136 - 145 mmol/L    Potassium 3.6 3.5 - 5.3 mmol/L    Chloride 94 (L) 98 - 107 mmol/L    Bicarbonate 29 21 - 32 mmol/L    Anion Gap 16 10 - 20 mmol/L    Urea Nitrogen 24 (H) 6 - 23 mg/dL    Creatinine 1.07 (H) 0.50 - 1.05 mg/dL    eGFR 53 (L) >60 mL/min/1.73m*2    Calcium 8.9 8.6 - 10.3 mg/dL    Albumin 4.2 3.4 - 5.0 g/dL    Alkaline Phosphatase 123 33 - 136 U/L    Total Protein 7.7 6.4 - 8.2 g/dL    AST 18 9 - 39 U/L    Bilirubin, Total 0.7 0.0 - 1.2 mg/dL    ALT 14 7 - 45 U/L   Magnesium   Result Value Ref Range    Magnesium 1.09 (L) 1.60 - 2.40 mg/dL   Blood Gas  Venous Full Panel   Result Value Ref Range    POCT pH, Venous 7.46 (H) 7.33 - 7.43 pH    POCT pCO2, Venous 40 (L) 41 - 51 mm Hg    POCT pO2, Venous 35 35 - 45 mm Hg    POCT SO2, Venous 62 45 - 75 %    POCT Oxy Hemoglobin, Venous 60.8 45.0 - 75.0 %    POCT Hematocrit Calculated, Venous 40.0 36.0 - 46.0 %    POCT Sodium, Venous 129 (L) 136 - 145 mmol/L    POCT Potassium, Venous 3.5 3.5 - 5.3 mmol/L    POCT Chloride, Venous 99 98 - 107 mmol/L    POCT Ionized Calicum, Venous 1.18 1.10 - 1.33 mmol/L    POCT Glucose, Venous 206 (H) 74 - 99 mg/dL    POCT Lactate, Venous 3.7 (H) 0.4 - 2.0 mmol/L    POCT Base Excess, Venous 4.2 (H) -2.0 - 3.0 mmol/L    POCT HCO3 Calculated, Venous 28.4 (H) 22.0 - 26.0 mmol/L    POCT Hemoglobin, Venous 13.4 12.0 - 16.0 g/dL    POCT Anion Gap, Venous 5.0 (L) 10.0 - 25.0 mmol/L    Patient Temperature      FiO2 21 %   B-Type Natriuretic Peptide   Result Value Ref Range    BNP 36 0 - 99 pg/mL   Lipase   Result Value Ref Range    Lipase 49 9 - 82 U/L   Troponin I, High Sensitivity, Initial   Result Value Ref Range    Troponin I, High Sensitivity 4 0 - 13 ng/L   POCT GLUCOSE   Result Value Ref Range    POCT Glucose 193 (H) 74 - 99 mg/dL   Troponin, High Sensitivity, 1 Hour   Result Value Ref Range    Troponin I, High Sensitivity 4 0 - 13 ng/L   Blood Gas Lactic Acid, Venous   Result Value Ref Range    POCT Lactate, Venous 3.0 (H) 0.4 - 2.0 mmol/L   POCT GLUCOSE   Result Value Ref Range    POCT Glucose 144 (H) 74 - 99 mg/dL   Urinalysis with Reflex Culture and Microscopic   Result Value Ref Range    Color, Urine Light-Yellow Light-Yellow, Yellow, Dark-Yellow    Appearance, Urine Clear Clear    Specific Gravity, Urine 1.014 1.005 - 1.035    pH, Urine 6.5 5.0, 5.5, 6.0, 6.5, 7.0, 7.5, 8.0    Protein, Urine NEGATIVE NEGATIVE, 10 (TRACE), 20 (TRACE) mg/dL    Glucose, Urine Normal Normal mg/dL    Blood, Urine NEGATIVE NEGATIVE mg/dL    Ketones, Urine NEGATIVE NEGATIVE mg/dL    Bilirubin, Urine NEGATIVE  NEGATIVE mg/dL    Urobilinogen, Urine Normal Normal mg/dL    Nitrite, Urine NEGATIVE NEGATIVE    Leukocyte Esterase, Urine 250 Rosanna/uL (A) NEGATIVE   Microscopic Only, Urine   Result Value Ref Range    WBC, Urine 11-20 (A) 1-5, NONE /HPF    RBC, Urine 3-5 NONE, 1-2, 3-5 /HPF     CT head wo IV contrast    Result Date: 4/13/2025  Interpreted By:  José Miguel Evans, STUDY: CT HEAD WO IV CONTRAST;  4/13/2025 12:10 pm   INDICATION: Signs/Symptoms:weak.   COMPARISON: 03/21/2025   ACCESSION NUMBER(S): [id number]   ORDERING CLINICIAN: CALLUM DE LA ROSA   TECHNIQUE: Sequential trans axial images were obtained  .   FINDINGS: INTRACRANIAL:   There is moderate prominence of the cortical sulci indicating atrophy.   There is mild-to-moderate ventriculomegaly, again consistent with atrophy.   Mild decreased attenuation of the periventricular and long tracks of the white matter most consistent with gliosis from arterial disease. There is no evidence of definite subacute infarction, intracranial hemorrhage or mass.     EXTRACRANIAL: Visualized paranasal sinuses and mastoids are clear. The calvarium is intact.       Mild age related degenerative change as described without acute findings or significant change from the prior exam.   Signed by: José Miguel Evans 4/13/2025 12:28 PM Dictation workstation:   [id number]    XR chest 1 view    Result Date: 4/13/2025  STUDY: Chest Radiograph;  4/13/2025 12:11 PM INDICATION: Shortness of breath. COMPARISON: 3/21/2025 XR chest. ACCESSION NUMBER(S): [id number] ORDERING CLINICIAN: CALLUM DE LA ROSA TECHNIQUE:  Frontal chest was obtained at 12:11 hours. FINDINGS: CARDIOMEDIASTINAL SILHOUETTE: Cardiomediastinal silhouette is normal in size and configuration.  LUNGS: Lungs are clear.  ABDOMEN: No remarkable upper abdominal findings.  BONES: No acute osseous changes.    No radiographic evidence of acute cardiopulmonary disease. Signed by Jose Angel Elias MD       Assessment & Plan      81yo CF with PMH of  NIDDM-type II, vascular dementia, HTN, HLD, and recent dysphagia that presented to the ED with poor PO intake and was admitted for generalized weakness.    Generalized weakness  - likely secondary to poor PO intake  - start IV fluids at maintenance  - consult speech therapy  - consult dietary  - PT/OT consulted, appreciate recs    Dysphagia  - continue easy to chew diet  - speech therapy consulted, appreciate recs    Hypomagnesemia  - replete and recheck    NIDDM-type II  - most recent HbA1c 7.7 just two months ago  - hold home metformin  - start ISS  - rec dietary consult to help family choose low carb/sugar items    Hypertension  - hold home chlorthlidone in setting of above  - continue home atenolol but at lower dose    Vascular dementia  - continue home donepezil and mirtazapine    DVT Proph: lovenox    Dispo: Patient requires close inpatient monitoring in setting of weakness secondary to poor PO intake with dysphagia, hospital stay likely >2midnights.     Ana Blake, DO

## 2025-04-14 ENCOUNTER — DOCUMENTATION (OUTPATIENT)
Dept: PRIMARY CARE | Facility: CLINIC | Age: 80
End: 2025-04-14
Payer: MEDICARE

## 2025-04-14 ENCOUNTER — HOME CARE VISIT (OUTPATIENT)
Dept: HOME HEALTH SERVICES | Facility: HOME HEALTH | Age: 80
End: 2025-04-14

## 2025-04-14 LAB
ALBUMIN SERPL BCP-MCNC: 3.3 G/DL (ref 3.4–5)
ALP SERPL-CCNC: 98 U/L (ref 33–136)
ALT SERPL W P-5'-P-CCNC: 11 U/L (ref 7–45)
ANION GAP SERPL CALC-SCNC: 11 MMOL/L (ref 10–20)
AST SERPL W P-5'-P-CCNC: 14 U/L (ref 9–39)
BILIRUB SERPL-MCNC: 0.7 MG/DL (ref 0–1.2)
BUN SERPL-MCNC: 12 MG/DL (ref 6–23)
CALCIUM SERPL-MCNC: 7.8 MG/DL (ref 8.6–10.3)
CHLORIDE SERPL-SCNC: 101 MMOL/L (ref 98–107)
CO2 SERPL-SCNC: 28 MMOL/L (ref 21–32)
CREAT SERPL-MCNC: 0.68 MG/DL (ref 0.5–1.05)
EGFRCR SERPLBLD CKD-EPI 2021: 89 ML/MIN/1.73M*2
ERYTHROCYTE [DISTWIDTH] IN BLOOD BY AUTOMATED COUNT: 12.9 % (ref 11.5–14.5)
GLUCOSE BLD MANUAL STRIP-MCNC: 156 MG/DL (ref 74–99)
GLUCOSE BLD MANUAL STRIP-MCNC: 171 MG/DL (ref 74–99)
GLUCOSE BLD MANUAL STRIP-MCNC: 208 MG/DL (ref 74–99)
GLUCOSE BLD MANUAL STRIP-MCNC: 230 MG/DL (ref 74–99)
GLUCOSE SERPL-MCNC: 148 MG/DL (ref 74–99)
HCT VFR BLD AUTO: 35.2 % (ref 36–46)
HGB BLD-MCNC: 11.9 G/DL (ref 12–16)
HOLD SPECIMEN: NORMAL
MAGNESIUM SERPL-MCNC: 1.26 MG/DL (ref 1.6–2.4)
MCH RBC QN AUTO: 30.2 PG (ref 26–34)
MCHC RBC AUTO-ENTMCNC: 33.8 G/DL (ref 32–36)
MCV RBC AUTO: 89 FL (ref 80–100)
NRBC BLD-RTO: 0 /100 WBCS (ref 0–0)
PLATELET # BLD AUTO: 90 X10*3/UL (ref 150–450)
POTASSIUM SERPL-SCNC: 3.3 MMOL/L (ref 3.5–5.3)
PROT SERPL-MCNC: 6.2 G/DL (ref 6.4–8.2)
RBC # BLD AUTO: 3.94 X10*6/UL (ref 4–5.2)
SODIUM SERPL-SCNC: 137 MMOL/L (ref 136–145)
WBC # BLD AUTO: 7.4 X10*3/UL (ref 4.4–11.3)

## 2025-04-14 PROCEDURE — 2500000002 HC RX 250 W HCPCS SELF ADMINISTERED DRUGS (ALT 637 FOR MEDICARE OP, ALT 636 FOR OP/ED): Performed by: FAMILY MEDICINE

## 2025-04-14 PROCEDURE — 80053 COMPREHEN METABOLIC PANEL: CPT | Performed by: FAMILY MEDICINE

## 2025-04-14 PROCEDURE — 82947 ASSAY GLUCOSE BLOOD QUANT: CPT

## 2025-04-14 PROCEDURE — 36415 COLL VENOUS BLD VENIPUNCTURE: CPT | Performed by: FAMILY MEDICINE

## 2025-04-14 PROCEDURE — 85027 COMPLETE CBC AUTOMATED: CPT | Performed by: FAMILY MEDICINE

## 2025-04-14 PROCEDURE — 2500000004 HC RX 250 GENERAL PHARMACY W/ HCPCS (ALT 636 FOR OP/ED): Performed by: FAMILY MEDICINE

## 2025-04-14 PROCEDURE — 97165 OT EVAL LOW COMPLEX 30 MIN: CPT | Mod: GO

## 2025-04-14 PROCEDURE — 92610 EVALUATE SWALLOWING FUNCTION: CPT | Mod: GN

## 2025-04-14 PROCEDURE — 1100000001 HC PRIVATE ROOM DAILY

## 2025-04-14 PROCEDURE — 2500000001 HC RX 250 WO HCPCS SELF ADMINISTERED DRUGS (ALT 637 FOR MEDICARE OP): Performed by: FAMILY MEDICINE

## 2025-04-14 PROCEDURE — 83735 ASSAY OF MAGNESIUM: CPT | Performed by: FAMILY MEDICINE

## 2025-04-14 PROCEDURE — 97161 PT EVAL LOW COMPLEX 20 MIN: CPT | Mod: GP

## 2025-04-14 PROCEDURE — 99232 SBSQ HOSP IP/OBS MODERATE 35: CPT | Performed by: FAMILY MEDICINE

## 2025-04-14 RX ORDER — EAR PLUGS
1 EACH OTIC (EAR) 2 TIMES DAILY
Status: DISCONTINUED | OUTPATIENT
Start: 2025-04-14 | End: 2025-04-17 | Stop reason: HOSPADM

## 2025-04-14 RX ORDER — MAGNESIUM SULFATE HEPTAHYDRATE 40 MG/ML
2 INJECTION, SOLUTION INTRAVENOUS ONCE
Status: COMPLETED | OUTPATIENT
Start: 2025-04-14 | End: 2025-04-14

## 2025-04-14 RX ADMIN — MAGNESIUM SULFATE HEPTAHYDRATE 2 G: 40 INJECTION, SOLUTION INTRAVENOUS at 11:42

## 2025-04-14 RX ADMIN — MIRTAZAPINE 15 MG: 15 TABLET, FILM COATED ORAL at 20:06

## 2025-04-14 RX ADMIN — ENOXAPARIN SODIUM 40 MG: 40 INJECTION SUBCUTANEOUS at 15:20

## 2025-04-14 RX ADMIN — ATORVASTATIN CALCIUM 40 MG: 40 TABLET, FILM COATED ORAL at 09:05

## 2025-04-14 RX ADMIN — POTASSIUM CHLORIDE 20 MEQ: 1500 TABLET, EXTENDED RELEASE ORAL at 09:05

## 2025-04-14 RX ADMIN — INSULIN LISPRO 1 UNITS: 100 INJECTION, SOLUTION INTRAVENOUS; SUBCUTANEOUS at 16:47

## 2025-04-14 RX ADMIN — ATENOLOL 50 MG: 50 TABLET ORAL at 09:05

## 2025-04-14 RX ADMIN — INSULIN LISPRO 1 UNITS: 100 INJECTION, SOLUTION INTRAVENOUS; SUBCUTANEOUS at 09:05

## 2025-04-14 RX ADMIN — DOCUSATE SODIUM 100 MG: 100 CAPSULE, LIQUID FILLED ORAL at 09:05

## 2025-04-14 RX ADMIN — INSULIN LISPRO 2 UNITS: 100 INJECTION, SOLUTION INTRAVENOUS; SUBCUTANEOUS at 11:50

## 2025-04-14 RX ADMIN — Medication 1 APPLICATION: at 15:20

## 2025-04-14 RX ADMIN — DOCUSATE SODIUM 100 MG: 100 CAPSULE, LIQUID FILLED ORAL at 20:06

## 2025-04-14 RX ADMIN — SODIUM CHLORIDE 75 ML/HR: 9 INJECTION, SOLUTION INTRAVENOUS at 11:24

## 2025-04-14 RX ADMIN — DONEPEZIL HYDROCHLORIDE 10 MG: 5 TABLET ORAL at 20:05

## 2025-04-14 ASSESSMENT — COGNITIVE AND FUNCTIONAL STATUS - GENERAL
MOVING FROM LYING ON BACK TO SITTING ON SIDE OF FLAT BED WITH BEDRAILS: A LITTLE
EATING MEALS: A LITTLE
EATING MEALS: A LITTLE
PERSONAL GROOMING: A LITTLE
CLIMB 3 TO 5 STEPS WITH RAILING: TOTAL
MOVING TO AND FROM BED TO CHAIR: A LOT
MOBILITY SCORE: 12
PERSONAL GROOMING: A LITTLE
DRESSING REGULAR LOWER BODY CLOTHING: A LOT
PERSONAL GROOMING: A LITTLE
MOVING FROM LYING ON BACK TO SITTING ON SIDE OF FLAT BED WITH BEDRAILS: A LOT
TURNING FROM BACK TO SIDE WHILE IN FLAT BAD: A LITTLE
MOVING FROM LYING ON BACK TO SITTING ON SIDE OF FLAT BED WITH BEDRAILS: A LITTLE
DRESSING REGULAR UPPER BODY CLOTHING: A LITTLE
DRESSING REGULAR UPPER BODY CLOTHING: A LOT
MOBILITY SCORE: 12
MOVING TO AND FROM BED TO CHAIR: A LOT
TURNING FROM BACK TO SIDE WHILE IN FLAT BAD: A LOT
HELP NEEDED FOR BATHING: A LOT
DAILY ACTIVITIY SCORE: 15
TOILETING: A LOT
STANDING UP FROM CHAIR USING ARMS: A LOT
DRESSING REGULAR UPPER BODY CLOTHING: A LITTLE
WALKING IN HOSPITAL ROOM: A LOT
STANDING UP FROM CHAIR USING ARMS: A LOT
DRESSING REGULAR LOWER BODY CLOTHING: A LOT
EATING MEALS: A LITTLE
STANDING UP FROM CHAIR USING ARMS: A LOT
WALKING IN HOSPITAL ROOM: TOTAL
HELP NEEDED FOR BATHING: A LOT
MOVING TO AND FROM BED TO CHAIR: A LOT
WALKING IN HOSPITAL ROOM: TOTAL
DRESSING REGULAR LOWER BODY CLOTHING: A LOT
TOILETING: TOTAL
DAILY ACTIVITIY SCORE: 13
MOBILITY SCORE: 11
CLIMB 3 TO 5 STEPS WITH RAILING: TOTAL
TOILETING: A LOT
DAILY ACTIVITIY SCORE: 15
TURNING FROM BACK TO SIDE WHILE IN FLAT BAD: A LITTLE
HELP NEEDED FOR BATHING: A LOT
CLIMB 3 TO 5 STEPS WITH RAILING: TOTAL

## 2025-04-14 ASSESSMENT — PAIN SCALES - GENERAL
PAINLEVEL_OUTOF10: 0 - NO PAIN

## 2025-04-14 ASSESSMENT — PAIN - FUNCTIONAL ASSESSMENT
PAIN_FUNCTIONAL_ASSESSMENT: 0-10

## 2025-04-14 ASSESSMENT — ACTIVITIES OF DAILY LIVING (ADL)
BATHING_ASSISTANCE: MAXIMAL
ADL_ASSISTANCE: NEEDS ASSISTANCE

## 2025-04-14 NOTE — PROGRESS NOTES
Occupational Therapy    Evaluation    Patient Name: Inge Clark  MRN: [id number]  Department: [address]  Room: [address] [place] [address]  Today's Date: 4/14/2025  Time Calculation  Start Time: 1307  Stop Time: 1325  Time Calculation (min): 18 min    Assessment  IP OT Assessment  OT Assessment: Pt is a 79 yo F referred to occupational therapy for impaired self-care and functional mobility 2/2 hospitalization for hypomagnesium. Pt w/ decreased cognition this date and required mod A for bed mobility and CGA for STS and steps at EOB. Pt would benefit from continued OT services at the MOD intensity level to increase safety and functional independence.  Prognosis: Good  Barriers to Discharge Home: Caregiver assistance, Cognition needs  Caregiver Assistance: Caregiver assistance needed per identified barriers - however, level of patient's required assistance exceeds assistance available at home  Cognition Needs: 24hr supervision for safety awareness needed, Medication and/or medical management daily assist needed, Insight of patient limited regarding functional ability/needs, Cognition-related high falls risk  Evaluation/Treatment Tolerance: Patient tolerated treatment well  Medical Staff Made Aware: Yes  End of Session Communication: Bedside nurse  End of Session Patient Position: Bed, 3 rail up, Alarm on  Plan:  Treatment Interventions: ADL retraining, Functional transfer training, UE strengthening/ROM, Endurance training, Cognitive reorientation, Patient/family training, Equipment evaluation/education, Compensatory technique education  OT Frequency: 3 times per week  OT Discharge Recommendations: Moderate intensity level of continued care  Equipment Recommended upon Discharge: Wheeled walker  OT Recommended Transfer Status: Minimal assist, Assist of 1  OT - OK to Discharge: Yes(per OT POC)    Subjective   Current Problem:  1. Hypomagnesemia        2. Diarrhea, unspecified type        3. Weakness          General:  General  Reason for  Referral: Pt is a 81 yo F referred to occupational therapy for impaired self-care and functional mobility 2/2 hospitalization for hypomagnesium  Referred By: Dr. ELIN Blake  Past Medical History Relevant to Rehab: T2 DM, Vascular dementia, HTN, HLD, recent dysphagia  Family/Caregiver Present: No  Co-Treatment: PT  Co-Treatment Reason: To maximize pt safety and outcomes  Prior to Session Communication: Bedside nurse  Patient Position Received: Bed, 3 rail up, Alarm off, not on at start of session  Preferred Learning Style: verbal, kinesthetic  General Comment: Pt is pleasantly confused during evaluation and agreeable to therapy. Pt cleared by RN  prior to session.  Precautions:  Medical Precautions: Fall precautions  Precautions Comment: ramos Bocanegra IV, purewick    Pain:  Pain Assessment  Pain Assessment: 0-10  0-10 (Numeric) Pain Score: 0 - No pain    Objective   Cognition:  Overall Cognitive Status: Impaired at baseline (severe dementia)  Arousal/Alertness: Appropriate responses to stimuli  Orientation Level: Disoriented to place, Disoriented to time, Disoriented to situation, Disoriented to person (Pt only oriented to first name)    Home Living:  Type of Home: House  Lives With: Spouse  Home Layout: Two level  Home Access: Stairs to enter without rails  Entrance Stairs-Number of Steps: 3  Home Living Comments: Pt is a poor hisotrian, unable to obtain PLOF and home setup at this time   Prior Function:  Level of Lynn: Needs assistance with ADLs, Needs assistance with homemaking, Needs assistance with functional transfers  Receives Help From: Family  ADL Assistance: Needs assistance  Homemaking Assistance: Needs assistance  Ambulatory Assistance: Needs assistance (w/ walker)    ADL:  Eating Assistance: Minimal (Per EMR, pt's  was cutting up her food for her)  Grooming Assistance: Minimal  Bathing Assistance: Maximal  UE Dressing Assistance: Minimal  LE Dressing Assistance: Maximal  Toileting  Assistance with Device: Total  Toileting Deficit: Urinary Catheter  Functional Assistance: Stand by  ADL Comments: Pt completed bed mobility w/ min A and STs at EOB w/ CGA. Pt able to take steps at EOB w/ CGA and required max A for LE dressing. Otehr ADLs anticipated.  Activity Tolerance:  Endurance: Tolerates less than 10 min exercise, no significant change in vital signs  Bed Mobility/Transfers:   Bed Mobility  Bed Mobility: Yes  Bed Mobility 1  Bed Mobility 1: Supine to sitting, Sitting to supine  Level of Assistance 1: Moderate assistance  Bed Mobility Comments 1: Assist to bring BLEs into/out of bed and fully erect trunk at EOB    Transfers  Transfer: Yes  Transfer 1  Transfer From 1: Bed to  Transfer to 1: Stand  Technique 1: Sit to stand, Stand to sit  Transfer Device 1: Walker  Transfer Level of Assistance 1: Contact guard (+1 for safety)  Trials/Comments 1: Pt able to take approximately 5 steps at EOB w/ max VCs for sequencing and proper walker positioning    Sitting Balance:  Static Sitting Balance  Static Sitting-Balance Support: Feet unsupported  Static Sitting-Level of Assistance: Contact guard, Minimum assistance  Static Sitting-Comment/Number of Minutes: Pt w/ posterior lean present when seated EOB  Dynamic Sitting Balance  Dynamic Sitting-Balance Support: Feet supported  Dynamic Sitting-Level of Assistance: Contact guard, Minimum assistance  Dynamic Sitting-Balance: Forward lean  Standing Balance:  Static Standing Balance  Static Standing-Balance Support: Bilateral upper extremity supported  Static Standing-Level of Assistance: Contact guard  Dynamic Standing Balance  Dynamic Standing-Balance Support: Bilateral upper extremity supported  Dynamic Standing-Level of Assistance: Contact guard    Vision:   Vision - Basic Assessment  Current Vision: Wears glasses only for reading  Sensation:  Light Touch: No apparent deficits  Strength:  Strength Comments: BUE grossly 4-/5  Coordination:  Movements are  Fluid and Coordinated: Yes   Hand Function:  Hand Function  Gross Grasp: Functional  Coordination: Functional  Extremities:   RUE: Within Functional Limits   LUE: Within Functional Limits    Outcome Measures:   [facility] Daily Activity  Putting on and taking off regular lower body clothing: A lot  Bathing (including washing, rinsing, drying): A lot  Putting on and taking off regular upper body clothing: A lot  Toileting, which includes using toilet, bedpan or urinal: Total  Taking care of personal grooming such as brushing teeth: A little  Eating Meals: A little  Daily Activity - Total Score: 13    Education Documentation  Body Mechanics, taught by Emely Singh OT at 4/14/2025  2:42 PM.  Learner: Patient  Readiness: Acceptance  Method: Explanation  Response: Needs Reinforcement  Comment: Pt educated on POC, DC recs, safety and body mechanics when completing transfers and ADLs    ADL Training, taught by Emely Singh OT at 4/14/2025  2:42 PM.  Learner: Patient  Readiness: Acceptance  Method: Explanation  Response: Needs Reinforcement  Comment: Pt educated on POC, DC recs, safety and body mechanics when completing transfers and ADLs    Goals:   Encounter Problems       Encounter Problems (Active)       ADLs       Patient will perform UB and LB bathing with minimal assist  level of assistance and PRN bathroom equipment.       Start:  04/14/25    Expected End:  04/28/25            Patient with complete lower body dressing with minimal assist  level of assistance donning and doffing all LE clothes  with PRN adaptive equipment while supported sitting and standing       Start:  04/14/25    Expected End:  04/28/25            Patient will complete daily grooming tasks with set-up level of assistance and PRN adaptive equipment while supported sitting.       Start:  04/14/25    Expected End:  04/28/25            Patient will complete toileting including hygiene clothing management/hygiene with minimal assist  level of  assistance and PRN bathroom equipment.       Start:  04/14/25    Expected End:  04/28/25               BALANCE       Pt will maintain dynamic seated balance during ADL task with supervision level of assistance in order to demonstrate decreased risk of falling and improved postural control.       Start:  04/14/25    Expected End:  04/28/25               TRANSFERS       Patient will perform bed mobility supervision level of assistance and bed rails in order to improve safety and independence with mobility       Start:  04/14/25    Expected End:  04/28/25            Patient will complete functional transfer to chair/toilet with least restrictive device with contact guard assist level of assistance.       Start:  04/14/25    Expected End:  04/28/25

## 2025-04-14 NOTE — PROGRESS NOTES
Talked with provider and pt admitted.   Will hold off on ccm at this time.     Will revisit per provider once the pt dc from snf/hospital if appropriate for ccm.

## 2025-04-14 NOTE — PROGRESS NOTES
04/14/25 1143   Discharge Planning   Living Arrangements Spouse/significant other   Support Systems Spouse/significant other;Children   Assistance Needed patient is A&Ox2 with confusion, needs assistance with ADL's, uses a walker when able PCP- PRAVIN Hask[facility]   Type of Residence Private residence   Number of Stairs to Enter Residence 3   Number of Stairs Within Residence 0   Do you have animals or pets at home? No   Who is requesting discharge planning? Provider   Home or Post Acute Services Post acute facilities (Rehab/SNF/etc)   Type of Post Acute Facility Services Skilled nursing   Expected Discharge Disposition SNF  (PT/OT evals pending but family interested in SNF)   Does the patient need discharge transport arranged? Yes   RoundTrip coordination needed? Yes   Has discharge transport been arranged? No     4/14/2025 1145: PAQN list sent to son at request of spouse at this time.

## 2025-04-14 NOTE — CONSULTS
"Nutrition Initial Assessment:   Nutrition Assessment    Reason for Assessment: Provider consult order    Patient is a 80 y.o. female presenting with poor PO intake & generalized weakness.     Speech therapy consulted (4/14), recommend a soft & bite sized diet w/ thin liquids. Diet order subsequently changed.     PMH: NIDDM-type II, Vascular dementia, HTN, HLD, and Dysphagia     Nutrition History:  Food and Nutrient History: Visit made, pt resting in bed at that time. Able to answer questions but a somewhat limited historian. Reports that at home she continues to eat her usual x3 meals/day. Notes she doesn't finish her meals because the portions her  gives her are \"too much food\". Only ate ~25% off lunch tray today. C/o feelings of abdominal fullness. Pt states she drinks chocolate shakes at home occassionally but not daily, unable to identify brand name. Is amenable to chocolate Ensure Plus HP & Magic Cups this admission. No additional needs at this time.  Food Allergy:  (None)     Anthropometrics:  Height: 160 cm (5' 3\")   Weight: 59.1 kg (130 lb 3.2 oz)   BMI (Calculated): 23.07  IBW/kg (Dietitian Calculated): 52.3 kg  Percent of IBW: 113 %    Weight History:   Wt Readings from Last 60 Encounters:   04/13/25 59.1 kg (130 lb 3.2 oz) --> Admit wt   03/21/25 56.2 kg (124 lb)   02/20/25 60.3 kg (133 lb)   01/27/25 60.8 kg (134 lb)   10/11/24 59.1 kg (130 lb 3.2 oz)   09/19/24 59 kg (130 lb)   03/29/24 58.1 kg (128 lb)     Weight Change %:  Weight History / % Weight Change: 3% x 3 months  Significant Weight Loss: No    Nutrition Focused Physical Exam Findings:  Subcutaneous Fat Loss:   Orbital Fat Pads: Mild-Moderate (slight dark circles and slight hollowing)  Buccal Fat Pads: Mild-Moderate (flat cheeks, minimal bounce)  Triceps: Well nourished (ample fat tissue)  Muscle Wasting:  Temporalis: Mild-Moderate (slight depression)  Pectoralis (Clavicular Region): Mild-Moderate (some protrusion of " clavicle)  Deltoid/Trapezius: Mild-Moderate (slight protrusion of acromion process)  Interosseous: Severe (depressed area between thumb and forefinger)  Edema:  Edema: +1 trace (LLE)  Physical Findings:  Hair: Negative  Eyes: Negative  Nails: Negative  Skin: Positive (Coccyx wound)  Digestive System Findings:  (Early satiety & Abdominal fullness)  Mouth Findings: Dysphagia    Nutrition Significant Labs:  BMP Trend:   Results from last 7 days   Lab Units 04/14/25  0717 04/13/25  1121   GLUCOSE mg/dL 148* 198*   CALCIUM mg/dL 7.8* 8.9   SODIUM mmol/L 137 135*   POTASSIUM mmol/L 3.3* 3.6   CO2 mmol/L 28 29   CHLORIDE mmol/L 101 94*   BUN mg/dL 12 24*   CREATININE mg/dL 0.68 1.07*      Nutrition Specific Medications:  Scheduled medications  docusate sodium, 100 mg, oral, BID  insulin lispro, 0-5 Units, subcutaneous, TID AC  mirtazapine, 15 mg, oral, Nightly  potassium chloride CR, 20 mEq, oral, Daily    Continuous medications  sodium chloride 0.9%, 75 mL/hr, Last Rate: 75 mL/hr (04/14/25 1124)    I/O:   LBM: 4/14/25 ; Stool Appearance: Soft, Loose (04/14/25 1135)    Dietary Orders (From admission, onward)       Start     Ordered    04/14/25 1213  Adult diet Consistent Carb; CCD 60 gm/meal; Soft and bite sized 6; 1:1 Feeding  Diet effective now        Question Answer Comment   Diet type Consistent Carb    Carb diet selection: CCD 60 gm/meal    Texture Soft and bite sized 6    Select tray type: 1:1 Feeding        04/14/25 1212    04/13/25 1512  May Participate in Room Service With Assistance  ( ROOM SERVICE MAY PARTICIPATE WITH ASSISTANCE)  Once        Question:  .  Answer:  Yes    04/13/25 1511             Estimated Needs:   Total Energy Estimated Needs in 24 hours (kCal):  (9649-3850)  Method for Estimating Needs: 25-28 kcals/kg x ABW (59.1 kg)  Total Protein Estimated Needs in 24 Hours (g):  (70+)  Method for Estimating 24 Hour Protein Needs: ~1.2 g/kg x ABW (59.1 kg)  Total Fluid Estimated Needs in 24 Hours (mL):   (3819-8676)  Method for Estimating 24 Hour Fluid Needs: 1mL/kcal or per team        Nutrition Diagnosis   Malnutrition Diagnosis  Patient has Malnutrition Diagnosis: Yes  Diagnosis Status: New  Malnutrition Diagnosis: Moderate malnutrition related to acute disease or injury  Related to: recent swallowing difficulty in setting of vascular dementia  As Evidenced by: meeting <75% EENs for >7 days & mild-moderate muscle wasting/fat losses      Nutrition Interventions/Recommendations      Nutrition Recommendations:  Per SLP recommendations, continue a soft & bite sized (level 6) diet w/ thin liquids  Recommend removal of 60gm CCD restriction given poor PO intake    Consider addition of an appetite stimulant if PO intake does not improve w/ altered diet texture    Check vitamin D level & replete PRN      Nutrition Interventions/Goals:   Medical Food Supplement: Commercial beverage medical food supplement therapy  Goal: Ensure Plus HP daily (350 kcals/20g protein each)  +  Magic Cup daily (290 kcals/9g protein each)      Nutrition Monitoring and Evaluation   Food/Nutrient Related History Monitoring  Monitoring and Evaluation Plan: Estimated Energy Intake  Estimated Energy Intake: Energy intake 50 -75% of estimated energy needs    Time Spent (min): 60 minutes

## 2025-04-14 NOTE — CONSULTS
Wound Care Consult     Visit Date: 4/14/2025      Patient Name: Inge Clark         MRN: [id number]           YOB: 1945     Reason for Consult:   Skin changes     Wound History:   Present on admission     Pertinent Labs:   Albumin   Date Value Ref Range Status   04/14/2025 3.3 (L) 3.4 - 5.0 g/dL Final     ALBUMIN (MG/L) IN URINE   Date Value Ref Range Status   06/09/2022 <7.0 Not Established mg/L Final       Wound Assessment:  Wound 04/13/25 Coccyx (Active)   Wound Image   04/13/25 1454   Shape linear 04/13/25 1500   Wound Length (cm) 3 cm 04/13/25 1500   Wound Width (cm) 0.5 cm 04/13/25 1500   Wound Surface Area (cm^2) 1.5 cm^2 04/13/25 1500   Drainage Description None 04/13/25 2200   Drainage Amount None 04/13/25 2200   Dressing Foam 04/13/25 2200   Dressing Changed Changed 04/13/25 2200   Dressing Status Clean;Dry 04/14/25 0905       Wound Team Summary Assessment:   81 y/o CF was admitted 4-13-25 with hypomagnesia, dysphagia, poor po intake.  EMR and images reviewed. Patient sound asleep.  Status d/w CLARISSA Hill.  Skin is intact other tan very dull pink faint discoloration along the intergluteal cleft with dry desquamation, no open or acute changes.  Assessment points to recovering MASD, zinc barrier recommended and ordered.      Wound Team Plan:   40% zinc oxide.     Michael aDvis RN, WCC, DWC  4/14/2025  4:30 PM

## 2025-04-14 NOTE — PROGRESS NOTES
Inge Clark is a 80 y.o. female on day 1 of admission presenting with Hypomagnesemia.      Subjective   Patient resting in bed, states she ate well this morning with assistance and denies any current complaints.  at bedside reports she looks better.    Objective     Last Recorded Vitals  /72 (BP Location: Right arm)   Pulse 83   Temp 36.2 °C (97.2 °F) (Temporal)   Resp 16   Wt 59.1 kg (130 lb 3.2 oz)   SpO2 96%   Intake/Output last 3 Shifts:    Intake/Output Summary (Last 24 hours) at 4/14/2025 1452  Last data filed at 4/14/2025 1445  Gross per 24 hour   Intake 1717.5 ml   Output 1700 ml   Net 17.5 ml       Admission Weight  Weight: 54.4 kg (120 lb) (04/13/25 1121)    Daily Weight  04/13/25 : 59.1 kg (130 lb 3.2 oz)    Image Results  ECG 12 lead  Atrial fibrillation with a competing junctional pacemaker  Nonspecific ST abnormality  Abnormal ECG  When compared with ECG of 11-AUG-2022 20:04,  Previous ECG has undetermined rhythm, needs review      Physical Exam  Constitutional: alert and oriented x1-2 at baseline, awake, cooperative, no acute distress  Skin: warm and dry  Head/Neck: Normocephalic, atraumatic  Eyes: clear sclera  ENMT: mucous membranes moist  Cardio: Regular rate and rhythm, no murmur  Resp: CTA bilaterally, good respiratory effort  Gastrointestinal: Soft, nontender, nondistended, BSx4  Musculoskeletal: generalized weakness  Neuro: alert and oriented x1-2, no neuro deficits  Psychological: Appropriate mood and behavior    Relevant Results  Scheduled medications  atenolol, 50 mg, oral, Daily  atorvastatin, 40 mg, oral, Daily  docusate sodium, 100 mg, oral, BID  donepezil, 10 mg, oral, Nightly  enoxaparin, 40 mg, subcutaneous, q24h  insulin lispro, 0-5 Units, subcutaneous, TID AC  mirtazapine, 15 mg, oral, Nightly  potassium chloride CR, 20 mEq, oral, Daily  zinc oxide, 1 Application, Topical, BID      Continuous medications     PRN medications  PRN medications: acetaminophen  **OR** acetaminophen **OR** acetaminophen, alum-mag hydroxide-simeth, melatonin, ondansetron ODT **OR** ondansetron    Results for orders placed or performed during the hospital encounter of 04/13/25 (from the past 24 hours)   POCT GLUCOSE   Result Value Ref Range    POCT Glucose 144 (H) 74 - 99 mg/dL   Urinalysis with Reflex Culture and Microscopic   Result Value Ref Range    Color, Urine Light-Yellow Light-Yellow, Yellow, Dark-Yellow    Appearance, Urine Clear Clear    Specific Gravity, Urine 1.014 1.005 - 1.035    pH, Urine 6.5 5.0, 5.5, 6.0, 6.5, 7.0, 7.5, 8.0    Protein, Urine NEGATIVE NEGATIVE, 10 (TRACE), 20 (TRACE) mg/dL    Glucose, Urine Normal Normal mg/dL    Blood, Urine NEGATIVE NEGATIVE mg/dL    Ketones, Urine NEGATIVE NEGATIVE mg/dL    Bilirubin, Urine NEGATIVE NEGATIVE mg/dL    Urobilinogen, Urine Normal Normal mg/dL    Nitrite, Urine NEGATIVE NEGATIVE    Leukocyte Esterase, Urine 250 Rosanna/uL (A) NEGATIVE   Extra Urine Gray Tube   Result Value Ref Range    Extra Tube Hold for add-ons.    Microscopic Only, Urine   Result Value Ref Range    WBC, Urine 11-20 (A) 1-5, NONE /HPF    RBC, Urine 3-5 NONE, 1-2, 3-5 /HPF   Lactate   Result Value Ref Range    Lactate 1.4 0.4 - 2.0 mmol/L   POCT GLUCOSE   Result Value Ref Range    POCT Glucose 199 (H) 74 - 99 mg/dL   CBC   Result Value Ref Range    WBC 7.4 4.4 - 11.3 x10*3/uL    nRBC 0.0 0.0 - 0.0 /100 WBCs    RBC 3.94 (L) 4.00 - 5.20 x10*6/uL    Hemoglobin 11.9 (L) 12.0 - 16.0 g/dL    Hematocrit 35.2 (L) 36.0 - 46.0 %    MCV 89 80 - 100 fL    MCH 30.2 26.0 - 34.0 pg    MCHC 33.8 32.0 - 36.0 g/dL    RDW 12.9 11.5 - 14.5 %    Platelets 90 (L) 150 - 450 x10*3/uL   Comprehensive Metabolic Panel   Result Value Ref Range    Glucose 148 (H) 74 - 99 mg/dL    Sodium 137 136 - 145 mmol/L    Potassium 3.3 (L) 3.5 - 5.3 mmol/L    Chloride 101 98 - 107 mmol/L    Bicarbonate 28 21 - 32 mmol/L    Anion Gap 11 10 - 20 mmol/L    Urea Nitrogen 12 6 - 23 mg/dL    Creatinine 0.68  0.50 - 1.05 mg/dL    eGFR 89 >60 mL/min/1.73m*2    Calcium 7.8 (L) 8.6 - 10.3 mg/dL    Albumin 3.3 (L) 3.4 - 5.0 g/dL    Alkaline Phosphatase 98 33 - 136 U/L    Total Protein 6.2 (L) 6.4 - 8.2 g/dL    AST 14 9 - 39 U/L    Bilirubin, Total 0.7 0.0 - 1.2 mg/dL    ALT 11 7 - 45 U/L   Magnesium   Result Value Ref Range    Magnesium 1.26 (L) 1.60 - 2.40 mg/dL   POCT GLUCOSE   Result Value Ref Range    POCT Glucose 156 (H) 74 - 99 mg/dL   POCT GLUCOSE   Result Value Ref Range    POCT Glucose 208 (H) 74 - 99 mg/dL         Assessment & Plan    79yo CF with PMH of NIDDM-type II, vascular dementia, HTN, HLD, and recent dysphagia that presented to the ED with poor PO intake and was admitted for generalized weakness.     Generalized weakness  - likely secondary to poor PO intake  - start IV fluids at maintenance  - consult dietary  - PT/OT consulted, rec mod intensity and awaiting preferences from family     Dysphagia  - speech therapy consulted, rec 1:1 feeding with soft&bite sized 6 texture     Hypomagnesemia  - replete and recheck for goal >2    Hypokalemia  - replete and recheck for goal of 4     NIDDM-type II  - most recent HbA1c 7.7 just two months ago  - hold home metformin, consider restart at lower dose pending discharging diet  - continue ISS  - rec dietary consult to help family choose low carb/sugar items     Hypertension  - hold home chlorthlidone in setting of above  - continue home atenolol but at lower dose     Vascular dementia  - continue home donepezil and mirtazapine     DVT Proph: lovenox     Dispo: Patient requires close inpatient monitoring in setting of weakness secondary to poor PO intake with dysphagia, discharge planning pending SNF choice and precert.      Malnutrition Diagnosis Status: New  Malnutrition Diagnosis: Moderate malnutrition related to acute disease or injury  Related to: recent swallowing difficulty in setting of vascular dementia  As Evidenced by: meeting <75% EENs for >7 days &  mild-moderate muscle wasting/fat losses  I agree with the dietitian's malnutrition diagnosis.         Ana Blake, DO

## 2025-04-14 NOTE — CARE PLAN
The patient's goals for the shift include  patient will remain comfortable throughout shift.     The clinical goals for the shift include Patient's pain will be managed throughout shift.

## 2025-04-14 NOTE — CARE PLAN
The patient's goals for the shift include      The clinical goals for the shift include Patient will remain free from injury, and rest comfortably this shift

## 2025-04-14 NOTE — PROGRESS NOTES
Physical Therapy    Physical Therapy Evaluation    Patient Name: Inge Clark  MRN: [id number]  Department: [address]  Room: [address] [place] [address]  Today's Date: 4/14/2025   Time Calculation  Start Time: 1308  Stop Time: 1326  Time Calculation (min): 18 min    Assessment/Plan   PT Assessment  PT Assessment Results: Decreased strength, Decreased mobility, Decreased cognition  Rehab Prognosis: Fair  Barriers to Discharge Home: Caregiver assistance, Cognition needs, Physical needs  Caregiver Assistance: Caregiver assistance needed per identified barriers - however, level of patient's required assistance exceeds assistance available at home  Cognition Needs: 24hr supervision for safety awareness needed, Cognition-related high falls risk  Physical Needs: 24hr mobility assistance needed, High falls risk due to function or environment  Evaluation/Treatment Tolerance: Patient limited by fatigue  Medical Staff Made Aware: Yes  Barriers to Participation: Comorbidities  End of Session Communication: Bedside nurse  End of Session Patient Position: Bed, 3 rail up, Alarm on  IP OR SWING BED PT PLAN  Inpatient or Swing Bed: Inpatient  PT Plan  Treatment/Interventions: Bed mobility, Transfer training, Gait training, Strengthening, Therapeutic exercise  PT Plan: Ongoing PT  PT Frequency: 3 times per week  PT Discharge Recommendations: Moderate intensity level of continued care  Equipment Recommended upon Discharge: Wheeled walker  PT Recommended Transfer Status: Assist x1  PT - OK to Discharge: Yes (Per PT POC)    Subjective   General Visit Information:  General  Reason for Referral: 79 yo female admitted 2' to hypomagnesemia, diarrhea  Referred By: Dr. ELIN Blake  Past Medical History Relevant to Rehab: T2 DM, Vascular dementia, HTN, HLD, recent dysphagia  Family/Caregiver Present: No  Co-Treatment: OT  Co-Treatment Reason: to maximize Pt's outcomes and safety  Prior to Session Communication: Bedside nurse  Patient Position Received: Bed, 3  rail up, Alarm off, not on at start of session  General Comment: Pt is pleasantly confused but is agreeable to work with therapy. pt is exhibiting strong dementia. She doesnt know her , where she is or month/year. Based on her currnet status, recommend MODERATE follow up serviceswith associated memory care unit  Home Living:  Home Living  Type of Home: House  Lives With: Spouse  Home Adaptive Equipment:  (unable to ascertain)  Home Layout: Two level (per Pt)  Home Access:  (unable to ascertain)  Bathroom Shower/Tub:  (unable to ascertain)  Prior Level of Function:  Prior Function Per Pt/Caregiver Report  Level of [place]: Needs assistance with ADLs, Needs assistance with homemaking, Needs assistance with functional transfers  Receives Help From: Family  Ambulatory Assistance: Needs assistance  Precautions:  Precautions  Medical Precautions: Fall precautions (YVAN Lucio masimo)             Objective   Pain:  Pain Assessment  Pain Assessment: 0-10  0-10 (Numeric) Pain Score: 0 - No pain  Cognition:  Cognition  Overall Cognitive Status: Impaired at baseline (severe dementia)    General Assessments:  General Observation  General Observation: Pt is exhibiting severe dementia. She would benifit from MODERATE follow up serviceswith memory care unit               Activity Tolerance  Endurance: Tolerates less than 10 min exercise, no significant change in vital signs         Strength  Strength Comments: B LE are 4 of 5  Static Sitting Balance  Static Sitting-Balance Support: Bilateral upper extremity supported, Feet supported  Static Sitting-Level of Assistance: Contact guard, Minimum assistance (for retro lean)    Static Standing Balance  Static Standing-Balance Support: Bilateral upper extremity supported (FWW)  Static Standing-Level of Assistance: Contact guard (x 2)  Dynamic Standing Balance  Dynamic Standing-Balance Support: Bilateral upper extremity supported (FWW)  Dynamic Standing-Level of Assistance:  Contact guard (x 2)  Dynamic Standing-Balance:  (slight retro lean)  Functional Assessments:  Bed Mobility  Bed Mobility: Yes  Bed Mobility 1  Bed Mobility 1: Supine to sitting, Sitting to supine  Level of Assistance 1: Minimum assistance, Moderate assistance    Transfers  Transfer: Yes  Transfer 1  Transfer From 1: Bed to  Transfer to 1: Stand  Technique 1: Sit to stand, Stand to sit  Transfer Device 1:  (FWW)  Transfer Level of Assistance 1: Contact guard (x 2)    Ambulation/Gait Training  Ambulation/Gait Training Performed: Yes  Ambulation/Gait Training 1  Surface 1: Level tile  Device 1: Rolling walker  Assistance 1: Contact guard (x 2)  Quality of Gait 1: Decreased step length    Stairs  Stairs: No  Extremity/Trunk Assessments:  RLE   RLE : Within Functional Limits  LLE   LLE : Within Functional Limits  Outcome Measures:  [facility] Basic Mobility  Turning from your back to your side while in a flat bed without using bedrails: A lot  Moving from lying on your back to sitting on the side of a flat bed without using bedrails: A lot  Moving to and from bed to chair (including a wheelchair): A lot  Standing up from a chair using your arms (e.g. wheelchair or bedside chair): A lot  To walk in hospital room: A lot  Climbing 3-5 steps with railing: Total  Basic Mobility - Total Score: 11    Encounter Problems       Encounter Problems (Active)       Mobility       STG - Patient will ambulate 50-80 feet close supervision with FWW (Progressing)       Start:  04/14/25    Expected End:  04/28/25               PT Transfers       STG - Patient to transfer to and from sit to supine close supervision (Progressing)       Start:  04/14/25    Expected End:  04/28/25            STG - Patient will transfer sit to and from stand close supervision with FWW (Progressing)       Start:  04/14/25    Expected End:  04/28/25                       Education Documentation  No documentation found.  Education Comments  No comments  found.

## 2025-04-14 NOTE — PROGRESS NOTES
Speech-Language Pathology    Speech-Language Pathology Clinical Swallow Evaluation    Patient Name: Inge Clark  MRN: [id number]  : 1945  Today's Date: 25  Start Time: 1141  Stop Time: 1205  Time Calculation (min): 24 min      ASSESSMENT  Impressions:  Mild oral phase and no suspected pharyngeal phase dysphagia based on clinical swallow evaluation. Pt would benefit from skilled ST to minimize aspiration risk and ensure ongoing safety with the least restrictive diet.  Prognosis: Good    PLAN    RECOMMENDATIONS:  Is MBSS recommended? No; no pharyngeal dysphagia suspected.  Solid consistency: Soft/bite-sized (IDDSI level 6)  Liquid consistency: Thin (IDDSI 0)  Medication administration: Crushed in puree, Whole in puree    Compensatory swallow strategies:  - Upright positioning for all PO intake  - Slow rate of intake  - One bite/sip at a time  - Supervision recommended during meals    Recommended frequency/duration:  Skilled SLP services recommended: Yes  Frequency: 1x/week as needed  Duration: x1 follow-up visit to ensure ongoing safety with current diet  Discharge recommendation: Home with no further SLP  Treatment/Interventions: Assess diet tolerance  Strengths: Family/caregiver support and Baseline functional status  Barriers to participation in tx: Cognition    Goals (start date 2025):  - Patient will tolerate current diet without noted pulmonary compromise or evidence of pulmonary sequela as noted in patient chart and/or reported by patient/family.   Status: The patient's  cut up the meat / turkey he was feeding her into tiny pieces. He requested the diet be downgraded to soft and bite sized.    Progress this date: n/a      SUBJECTIVE    PMHx relevant to rehab:   81yo CF with PMH of NIDDM-type II, vascular dementia, HTN, HLD, and recent dysphagia that presented to the ED with poor PO intake and was admitted for generalized weakness.   Chief complaint: Pt was admitted on 25 due to    Chief Complaint   Patient presents with    Hyperglycemia    Tremors   . She was found to have Hypomagnesemia.    Relevant imaging results:  4/13/25 Chest X-Ray: IMPRESSION:  No radiographic evidence of acute cardiopulmonary disease    General Visit Information:  SLP Received On: 04/14/25  Patient Class: Inpatient  Living Environment: Home  Ordering Physician: Dr. Blake  Reason for Referral: concern for dysphagia  Prior to Session Communication: Bedside nurse    RN cleared pt to participate in session and reported that her  has been feeding her.     Pt reported confused but pleasant conversation. Her  reported that the patient does better with food cut up for her- and she eats better these days when she is fed.    [employer] Diet: soft/ chopped, thin  Current Diet : regular, thin    Status at time of evaluation:  Pain Assessment  Pain Assessment: 0-10  0-10 (Numeric) Pain Score: 0 - No pain    Pt was pleasantly confused for session.  Orientation: Disoriented x4  Ability to follow functional commands: WFL  Nutritional status: Appears well-nourished/no concerns    Respiratory status: Room air  Baseline Vocal Quality: Normal  Volitional Cough: Strong  Volitional Swallow: Within Functional Limits  Patient positioning: Upright in bed      OBJECTIVE  Clinical swallow evaluation completed and consisted of interview (family assisted), limited oral motor assessment, and PO trials (turkey, mashed potatoes, water).    ORAL PHASE: Dentition in good condition. Oral mucosa were pink, moist, and free of obvious lesions. Lingual strength and ROM were good. Labial strength/ROM were wfl. Labial seal was adequate. Mastication of regular solids was wfl. A/P transit and oral clearance were good.    PHARYNGEAL PHASE: Laryngeal elevation was visualized or palpated with all trials, however adequacy of hyolaryngeal elevation/excursion cannot be determined at bedside. No immediate or delayed s/sx aspiration/penetration were  observed with any consistencies.    Was 3oz challenge administered: Yes; pt unable to successfully complete due to taking breaks. No overt s/sx aspiration were observed.     Treatment/Education:  Results and recommendations were relayed to: Patient and Family  Education provided: Yes   Learner: Patient and Significant other   Barriers to learning: Cognitive limitations barrier   Method of teaching: Verbal, Written, and Demonstration   Topic: role of ST, results of assessment, recommended diet modifications, and recommended safe swallow strategies   Outcome of teaching: Caregiver demonstrated good understanding  Treatment provided: No

## 2025-04-15 LAB
ALBUMIN SERPL BCP-MCNC: 3.5 G/DL (ref 3.4–5)
ALP SERPL-CCNC: 99 U/L (ref 33–136)
ALT SERPL W P-5'-P-CCNC: 9 U/L (ref 7–45)
ANION GAP SERPL CALC-SCNC: 16 MMOL/L (ref 10–20)
AST SERPL W P-5'-P-CCNC: 17 U/L (ref 9–39)
BACTERIA UR CULT: NORMAL
BILIRUB SERPL-MCNC: 0.7 MG/DL (ref 0–1.2)
BUN SERPL-MCNC: 8 MG/DL (ref 6–23)
CALCIUM SERPL-MCNC: 8.4 MG/DL (ref 8.6–10.3)
CHLORIDE SERPL-SCNC: 100 MMOL/L (ref 98–107)
CO2 SERPL-SCNC: 24 MMOL/L (ref 21–32)
CREAT SERPL-MCNC: 0.62 MG/DL (ref 0.5–1.05)
EGFRCR SERPLBLD CKD-EPI 2021: >90 ML/MIN/1.73M*2
ERYTHROCYTE [DISTWIDTH] IN BLOOD BY AUTOMATED COUNT: 13.2 % (ref 11.5–14.5)
GLUCOSE BLD MANUAL STRIP-MCNC: 158 MG/DL (ref 74–99)
GLUCOSE BLD MANUAL STRIP-MCNC: 171 MG/DL (ref 74–99)
GLUCOSE BLD MANUAL STRIP-MCNC: 189 MG/DL (ref 74–99)
GLUCOSE BLD MANUAL STRIP-MCNC: 281 MG/DL (ref 74–99)
GLUCOSE SERPL-MCNC: 163 MG/DL (ref 74–99)
HCT VFR BLD AUTO: 40.9 % (ref 36–46)
HGB BLD-MCNC: 12.9 G/DL (ref 12–16)
MAGNESIUM SERPL-MCNC: 1.56 MG/DL (ref 1.6–2.4)
MCH RBC QN AUTO: 30.4 PG (ref 26–34)
MCHC RBC AUTO-ENTMCNC: 31.5 G/DL (ref 32–36)
MCV RBC AUTO: 96 FL (ref 80–100)
NRBC BLD-RTO: 0 /100 WBCS (ref 0–0)
PLATELET # BLD AUTO: 110 X10*3/UL (ref 150–450)
POTASSIUM SERPL-SCNC: 3.5 MMOL/L (ref 3.5–5.3)
PROT SERPL-MCNC: 6.8 G/DL (ref 6.4–8.2)
RBC # BLD AUTO: 4.25 X10*6/UL (ref 4–5.2)
SODIUM SERPL-SCNC: 136 MMOL/L (ref 136–145)
WBC # BLD AUTO: 8.5 X10*3/UL (ref 4.4–11.3)

## 2025-04-15 PROCEDURE — 94760 N-INVAS EAR/PLS OXIMETRY 1: CPT

## 2025-04-15 PROCEDURE — 1100000001 HC PRIVATE ROOM DAILY

## 2025-04-15 PROCEDURE — 2500000001 HC RX 250 WO HCPCS SELF ADMINISTERED DRUGS (ALT 637 FOR MEDICARE OP): Performed by: FAMILY MEDICINE

## 2025-04-15 PROCEDURE — 97530 THERAPEUTIC ACTIVITIES: CPT | Mod: GO,CO,59

## 2025-04-15 PROCEDURE — 84075 ASSAY ALKALINE PHOSPHATASE: CPT | Performed by: FAMILY MEDICINE

## 2025-04-15 PROCEDURE — 99233 SBSQ HOSP IP/OBS HIGH 50: CPT | Performed by: INTERNAL MEDICINE

## 2025-04-15 PROCEDURE — 36415 COLL VENOUS BLD VENIPUNCTURE: CPT | Performed by: FAMILY MEDICINE

## 2025-04-15 PROCEDURE — 2500000004 HC RX 250 GENERAL PHARMACY W/ HCPCS (ALT 636 FOR OP/ED): Performed by: NURSE PRACTITIONER

## 2025-04-15 PROCEDURE — 82947 ASSAY GLUCOSE BLOOD QUANT: CPT

## 2025-04-15 PROCEDURE — 83735 ASSAY OF MAGNESIUM: CPT | Performed by: FAMILY MEDICINE

## 2025-04-15 PROCEDURE — 97535 SELF CARE MNGMENT TRAINING: CPT | Mod: GO,CO

## 2025-04-15 PROCEDURE — 2500000002 HC RX 250 W HCPCS SELF ADMINISTERED DRUGS (ALT 637 FOR MEDICARE OP, ALT 636 FOR OP/ED): Performed by: FAMILY MEDICINE

## 2025-04-15 PROCEDURE — 85027 COMPLETE CBC AUTOMATED: CPT | Performed by: FAMILY MEDICINE

## 2025-04-15 PROCEDURE — 2500000004 HC RX 250 GENERAL PHARMACY W/ HCPCS (ALT 636 FOR OP/ED): Performed by: FAMILY MEDICINE

## 2025-04-15 RX ORDER — MAGNESIUM SULFATE HEPTAHYDRATE 40 MG/ML
2 INJECTION, SOLUTION INTRAVENOUS ONCE
Status: COMPLETED | OUTPATIENT
Start: 2025-04-15 | End: 2025-04-15

## 2025-04-15 RX ADMIN — DONEPEZIL HYDROCHLORIDE 10 MG: 5 TABLET ORAL at 20:13

## 2025-04-15 RX ADMIN — ATORVASTATIN CALCIUM 40 MG: 40 TABLET, FILM COATED ORAL at 08:22

## 2025-04-15 RX ADMIN — Medication 1 APPLICATION: at 04:02

## 2025-04-15 RX ADMIN — INSULIN LISPRO 3 UNITS: 100 INJECTION, SOLUTION INTRAVENOUS; SUBCUTANEOUS at 12:16

## 2025-04-15 RX ADMIN — DOCUSATE SODIUM 100 MG: 100 CAPSULE, LIQUID FILLED ORAL at 08:22

## 2025-04-15 RX ADMIN — ACETAMINOPHEN 650 MG: 325 TABLET ORAL at 08:22

## 2025-04-15 RX ADMIN — INSULIN LISPRO 1 UNITS: 100 INJECTION, SOLUTION INTRAVENOUS; SUBCUTANEOUS at 08:22

## 2025-04-15 RX ADMIN — MAGNESIUM SULFATE HEPTAHYDRATE 2 G: 2 INJECTION, SOLUTION INTRAVENOUS at 10:23

## 2025-04-15 RX ADMIN — ATENOLOL 50 MG: 50 TABLET ORAL at 08:22

## 2025-04-15 RX ADMIN — DOCUSATE SODIUM 100 MG: 100 CAPSULE, LIQUID FILLED ORAL at 20:13

## 2025-04-15 RX ADMIN — MIRTAZAPINE 15 MG: 15 TABLET, FILM COATED ORAL at 20:14

## 2025-04-15 RX ADMIN — POTASSIUM CHLORIDE 20 MEQ: 1500 TABLET, EXTENDED RELEASE ORAL at 08:22

## 2025-04-15 RX ADMIN — Medication 1 APPLICATION: at 08:26

## 2025-04-15 RX ADMIN — ENOXAPARIN SODIUM 40 MG: 40 INJECTION SUBCUTANEOUS at 17:03

## 2025-04-15 RX ADMIN — INSULIN LISPRO 1 UNITS: 100 INJECTION, SOLUTION INTRAVENOUS; SUBCUTANEOUS at 17:05

## 2025-04-15 RX ADMIN — Medication 1 APPLICATION: at 20:04

## 2025-04-15 ASSESSMENT — COGNITIVE AND FUNCTIONAL STATUS - GENERAL
HELP NEEDED FOR BATHING: A LOT
EATING MEALS: A LITTLE
DRESSING REGULAR LOWER BODY CLOTHING: A LOT
TOILETING: TOTAL
DRESSING REGULAR LOWER BODY CLOTHING: TOTAL
TOILETING: A LOT
TURNING FROM BACK TO SIDE WHILE IN FLAT BAD: A LITTLE
EATING MEALS: A LITTLE
MOVING TO AND FROM BED TO CHAIR: A LOT
HELP NEEDED FOR BATHING: A LOT
DAILY ACTIVITIY SCORE: 15
WALKING IN HOSPITAL ROOM: TOTAL
CLIMB 3 TO 5 STEPS WITH RAILING: TOTAL
DRESSING REGULAR UPPER BODY CLOTHING: A LITTLE
STANDING UP FROM CHAIR USING ARMS: A LOT
MOVING FROM LYING ON BACK TO SITTING ON SIDE OF FLAT BED WITH BEDRAILS: A LITTLE
DAILY ACTIVITIY SCORE: 12
PERSONAL GROOMING: A LITTLE
DRESSING REGULAR UPPER BODY CLOTHING: A LOT
PERSONAL GROOMING: A LITTLE
MOBILITY SCORE: 12

## 2025-04-15 ASSESSMENT — PAIN SCALES - GENERAL
PAINLEVEL_OUTOF10: 0 - NO PAIN

## 2025-04-15 ASSESSMENT — PAIN - FUNCTIONAL ASSESSMENT
PAIN_FUNCTIONAL_ASSESSMENT: 0-10
PAIN_FUNCTIONAL_ASSESSMENT: 0-10

## 2025-04-15 ASSESSMENT — ACTIVITIES OF DAILY LIVING (ADL): HOME_MANAGEMENT_TIME_ENTRY: 12

## 2025-04-15 NOTE — PROGRESS NOTES
Occupational Therapy    Occupational Therapy Treatment    Name: Inge Clark  MRN: [id number]  Department: [address]  Room: [address] [place] [address]  Date: 04/15/25  Time Calculation  Start Time: 1034  Stop Time: 1059  Time Calculation (min): 25 min    Assessment:  OT Assessment: Pt is a 81 yo F referred to occupational therapy for impaired self-care and functional mobility 2/2 hospitalization for hypomagnesium. Pt w/ decreased cognition this date and required min A for bed mobility and CGA for STS and transfer to bedside chair. Pt dependent for LB dressing. Pt would benefit from continued OT services at the MOD intensity level to increase safety and functional independence.  Prognosis: Good  Barriers to Discharge Home: Caregiver assistance, Cognition needs  Caregiver Assistance: Caregiver assistance needed per identified barriers - however, level of patient's required assistance exceeds assistance available at home  Cognition Needs: 24hr supervision for safety awareness needed, Medication and/or medical management daily assist needed, Insight of patient limited regarding functional ability/needs, Cognition-related high falls risk  Evaluation/Treatment Tolerance: Patient tolerated treatment well  Medical Staff Made Aware: Yes  End of Session Communication: Bedside nurse  End of Session Patient Position: Bed, 3 rail up, Alarm on  Plan:  Treatment Interventions: ADL retraining, Functional transfer training, UE strengthening/ROM, Endurance training, Cognitive reorientation, Patient/family training, Equipment evaluation/education, Compensatory technique education  OT Frequency: 3 times per week  OT Discharge Recommendations: Moderate intensity level of continued care  Equipment Recommended upon Discharge: Wheeled walker  OT Recommended Transfer Status: Assist of 1  OT - OK to Discharge: Yes (In accord with OT POC)    Subjective   Previous Visit Info:  OT Last Visit  OT Received On: 04/15/25  General:  General  Reason for Referral: 80  yo female admitted 2' to hypomagnesemia, diarrhea  Referred By: Dr. ELIN Blake  Past Medical History Relevant to Rehab: T2 DM, Vascular dementia, HTN, HLD, recent dysphagia  Family/Caregiver Present: Yes  Caregiver Feedback: spouse present appropriately encouraging patient engagement  Prior to Session Communication: Bedside nurse  Patient Position Received: Bed, 3 rail up, Alarm on  Preferred Learning Style: verbal, kinesthetic  General Comment: Pt pleasantly confused and compliant to treatment.  Precautions:  Medical Precautions: Fall precautions (Purewick external catheter, IV, Stef constant pulse ox.)    Pain Assessment:  Pain Assessment  Pain Assessment: 0-10  0-10 (Numeric) Pain Score: 0 - No pain    Objective   Cognition:  Overall Cognitive Status: Impaired at baseline  Arousal/Alertness: Appropriate responses to stimuli  Orientation Level: Disoriented to place, Disoriented to time, Disoriented to situation  Activities of Daily Living: LE Dressing  LE Dressing: Yes  Sock Level of Assistance: Dependent  Adult Briefs Level of Assistance: Dependent  LE Dressing Where Assessed: Edge of bed  LE Dressing Comments: breif started over feet and pulled up over hips from knee level arm in arm.     Bed Mobility/Transfers: Bed Mobility  Bed Mobility: Yes  Bed Mobility 1  Bed Mobility 1: Supine to sitting, Scooting  Level of Assistance 1: Minimum assistance, Moderate verbal cues, Moderate tactile cues  Bed Mobility Comments 1: Assist needed with LE's and trunk    Transfers  Transfer: Yes  Transfer 1  Transfer From 1: Bed to  Transfer to 1: Stand  Technique 1: Sit to stand, Stand to sit  Transfer Level of Assistance 1: Contact guard (arm in arm with assist to counter initial retro-lean)  Trials/Comments 1: (x2)  Transfers 2  Transfer From 2: Stand to  Transfer to 2: Chair with arms  Technique 2: Stand pivot  Transfer Level of Assistance 2: Arm in arm assistance, Moderate verbal cues, Moderate tactile  cues  Trials/Comments 2: Increased time needed with posterior support and tactile cues for sequencing.    Sitting Balance:  Static Sitting Balance  Static Sitting-Balance Support: Feet supported  Static Sitting-Level of Assistance: Contact guard  Static Sitting-Comment/Number of Minutes: Mild posterior lean corrected.  Dynamic Sitting Balance  Dynamic Sitting-Balance Support: Feet supported  Dynamic Sitting-Level of Assistance: Contact guard  Dynamic Sitting-Balance: Forward lean, Reaching for objects, Trunk control activities  Standing Balance:  Static Standing Balance  Static Standing-Balance Support: Bilateral upper extremity supported  Static Standing-Level of Assistance: Contact guard  Dynamic Standing Balance  Dynamic Standing-Balance Support: Bilateral upper extremity supported  Dynamic Standing-Level of Assistance: Contact guard    Outcome Measures:  [facility] Daily Activity  Putting on and taking off regular lower body clothing: Total  Bathing (including washing, rinsing, drying): A lot  Putting on and taking off regular upper body clothing: A lot  Toileting, which includes using toilet, bedpan or urinal: Total  Taking care of personal grooming such as brushing teeth: A little  Eating Meals: A little  Daily Activity - Total Score: 12    Education Documentation  Precautions, taught by ISAAK Dewitt at 4/15/2025 11:58 AM.  Learner: Significant Other, Patient  Readiness: Acceptance  Method: Explanation, Demonstration  Response: Verbalizes Understanding, Demonstrated Understanding, Needs Reinforcement  Comment: Pt educated on falls precautions, effective body mechanics for mobility and self-care in session. No evidence of learning despite patient participation and compliance. Habituation and repetition indicated with continued reinforcement for uptake.    Body Mechanics, taught by ISAAK Dewitt at 4/15/2025 11:58 AM.  Learner: Significant Other, Patient  Readiness: Acceptance  Method: Explanation,  Demonstration  Response: Verbalizes Understanding, Demonstrated Understanding, Needs Reinforcement  Comment: Pt educated on falls precautions, effective body mechanics for mobility and self-care in session. No evidence of learning despite patient participation and compliance. Habituation and repetition indicated with continued reinforcement for uptake.    ADL Training, taught by ISAAK Dewitt at 4/15/2025 11:58 AM.  Learner: Significant Other, Patient  Readiness: Acceptance  Method: Explanation, Demonstration  Response: Verbalizes Understanding, Demonstrated Understanding, Needs Reinforcement  Comment: Pt educated on falls precautions, effective body mechanics for mobility and self-care in session. No evidence of learning despite patient participation and compliance. Habituation and repetition indicated with continued reinforcement for uptake.    Goals:  Encounter Problems       Encounter Problems (Active)       ADLs       Patient will perform UB and LB bathing with minimal assist  level of assistance and PRN bathroom equipment. (Progressing)       Start:  04/14/25    Expected End:  04/28/25            Patient with complete lower body dressing with minimal assist  level of assistance donning and doffing all LE clothes  with PRN adaptive equipment while supported sitting and standing (Progressing)       Start:  04/14/25    Expected End:  04/28/25            Patient will complete daily grooming tasks with set-up level of assistance and PRN adaptive equipment while supported sitting.       Start:  04/14/25    Expected End:  04/28/25            Patient will complete toileting including hygiene clothing management/hygiene with minimal assist  level of assistance and PRN bathroom equipment.       Start:  04/14/25    Expected End:  04/28/25               BALANCE       Pt will maintain dynamic seated balance during ADL task with supervision level of assistance in order to demonstrate decreased risk of falling and  improved postural control. (Progressing)       Start:  04/14/25    Expected End:  04/28/25               TRANSFERS       Patient will perform bed mobility supervision level of assistance and bed rails in order to improve safety and independence with mobility (Progressing)       Start:  04/14/25    Expected End:  04/28/25            Patient will complete functional transfer to chair/toilet with least restrictive device with contact guard assist level of assistance. (Progressing)       Start:  04/14/25    Expected End:  04/28/25

## 2025-04-15 NOTE — CARE PLAN
The patient's goals for the shift include patient will remain safe.     The patient will work with therapy and be up in chair this shift.

## 2025-04-15 NOTE — PROGRESS NOTES
04/15/25 0858   Discharge Planning   Living Arrangements Spouse/significant other   Support Systems Spouse/significant other;Children   Assistance Needed patient is A&Ox2 with confusion, needs assistance with ADL's, uses a walker when able PCP- PRAVIN [facility]   Type of Residence Private residence   Number of Stairs to Enter Residence 3   Number of Stairs Within Residence 0   Do you have animals or pets at home? No   Who is requesting discharge planning? Provider   Home or Post Acute Services Post acute facilities (Rehab/SNF/etc)   Type of Post Acute Facility Services Skilled nursing   Expected Discharge Disposition SNF  (SNF choices obtained from son- 1.[facility] 2. [facility] 3. [facility] referral built and sent in [facility])   Does the patient need discharge transport arranged? Yes   RoundTrip coordination needed? Yes   Has discharge transport been arranged? No     4/15/2025 1140: Voicemail left for son to confirm [facility] is FOC so insurance authorization can be submitted.

## 2025-04-15 NOTE — PROGRESS NOTES
Patient: Inge Clark  Room/bed: 120/120-A  Admitted on: 4/13/2025    Age: 80 y.o.   Gender: female  Code Status:  Full Code   Admitting Dx: Hypomagnesemia [E83.42]  Weakness [R53.1]  Diarrhea, unspecified type [R19.7]    MRN: [id number]  PCP: PRAVIN Haskins-CNS       Subjective   Patient seen and examined this AM in her room. Patient is setting on the bed with  at bedside reports that she is much better than yesterday. She is confused  unable to provide accurate information regarding her BD, Place. She ina any N/V ,F/C.     Objective    Physical Exam  Constitutional:       Appearance: Normal appearance.   HENT:      Head: Normocephalic.      Mouth/Throat:      Mouth: Mucous membranes are moist.   Eyes:      Extraocular Movements: Extraocular movements intact.   Cardiovascular:      Rate and Rhythm: Normal rate and regular rhythm.   Pulmonary:      Effort: Pulmonary effort is normal.      Breath sounds: Normal breath sounds.   Abdominal:      General: Bowel sounds are normal.      Palpations: Abdomen is soft.      Comments: Dysphagia.    Musculoskeletal:      Cervical back: Normal range of motion.      Comments: Generalized weakness.    Skin:     General: Skin is warm.   Neurological:      General: No focal deficit present.      Mental Status: She is disoriented.      Comments: Confused , unable to state her birth date and place.    Psychiatric:         Mood and Affect: Mood normal.         Behavior: Behavior normal.          Temp:  [36.8 °C (98.2 °F)-36.9 °C (98.4 °F)] 36.8 °C (98.2 °F)  Heart Rate:  [] 93  Resp:  [16-18] 18  BP: (120-138)/(61-74) 133/71    Vitals:    04/13/25 1745   Weight: 59.1 kg (130 lb 3.2 oz)             I/Os    Intake/Output Summary (Last 24 hours) at 4/15/2025 1322  Last data filed at 4/15/2025 1305  Gross per 24 hour   Intake 420 ml   Output 1550 ml   Net -1130 ml       Labs:   Results from last 72 hours   Lab Units 04/15/25  0625 04/14/25  0717 04/13/25  1121    SODIUM mmol/L 136 137 135*   POTASSIUM mmol/L 3.5 3.3* 3.6   CHLORIDE mmol/L 100 101 94*   CO2 mmol/L 24 28 29   BUN mg/dL 8 12 24*   CREATININE mg/dL 0.62 0.68 1.07*   GLUCOSE mg/dL 163* 148* 198*   CALCIUM mg/dL 8.4* 7.8* 8.9   ANION GAP mmol/L 16 11 16   EGFR mL/min/1.73m*2 >90 89 53*      Results from last 72 hours   Lab Units 04/15/25  0625 04/14/25  0717 04/13/25  1121   WBC AUTO x10*3/uL 8.5 7.4 10.1   HEMOGLOBIN g/dL 12.9 11.9* 13.3   HEMATOCRIT % 40.9 35.2* 38.4   PLATELETS AUTO x10*3/uL 110* 90* 103*   NEUTROS PCT AUTO %  --   --  67.0   LYMPHS PCT AUTO %  --   --  23.5   MONOS PCT AUTO %  --   --  7.0   EOS PCT AUTO %  --   --  2.0      Lab Results   Component Value Date    CALCIUM 8.4 (L) 04/15/2025    PHOS 2.1 (L) 08/13/2022         Lab Results   Component Value Date    URINECULTURE  04/13/2025     Growth indicates contamination with periurethral howard. Repeat culture if clinically indicated.       Images:  ECG 12 lead  Atrial fibrillation with a competing junctional pacemaker  Nonspecific ST abnormality  Abnormal ECG  When compared with ECG of 11-AUG-2022 20:04,  Previous ECG has undetermined rhythm, needs review       Meds    Scheduled medications  atenolol, 50 mg, oral, Daily  atorvastatin, 40 mg, oral, Daily  docusate sodium, 100 mg, oral, BID  donepezil, 10 mg, oral, Nightly  enoxaparin, 40 mg, subcutaneous, q24h  insulin lispro, 0-5 Units, subcutaneous, TID AC  mirtazapine, 15 mg, oral, Nightly  potassium chloride CR, 20 mEq, oral, Daily  zinc oxide, 1 Application, Topical, BID      Continuous medications     PRN medications  PRN medications: acetaminophen **OR** acetaminophen **OR** acetaminophen, alum-mag hydroxide-simeth, melatonin, ondansetron ODT **OR** ondansetron     Assessment and Plan    Inge EDUARDO Amber is a 80 y.o. female  with PMH of HTN, HLD, T2DM with recent dysphagia that present to ED with poor PO intake and concern of hyperglycemia. She is been admitted for generalized weakness  .     Generalized weakness:   - Likely secondary to electrolytes imbalance, poor oral intake, and poor self-care related her her dementia.  - Maintain Fall precautions .  - PT/OT, Nutrition, and SLP consulted.  - Treat underlying condition.  -  reports overall improvement    CV: HTN ,HLD :  - On atenolol , atrovastatin   - VSS    T2DM:   - Sliding scale and accucheck Q4.   - Recent HgbA1c 7.7    Hypo ( magnesemia / kalemia) :  - Replaced with Mg 2g / Potassium 20 mEq.   - Today mg 1.56 improving  / K 3.5 normalized.   - Daily CMP , Mg     Dysphagia:   - Aspiration precautions   - Consultation to speech therapy  recommended  adult diet consistent Carb ; CCD 60gm/meal; soft&bite sized 6 ; 1:1 feeding.    Vascular dementia:   - On donepezil and mirtazapine.  - No acute aggressive behaviors noted at this time.  - Supportive care    DVT  prophylaxis :  -On Lovenox     Fluids/ electrolytes / nutritions:   - Laboratory data reviewed.   - Electrolytes reviewed.   - Daily CBC , CMP , Mg .    Disposition:   -Plan of care discussed with attending, Dr. Awad.  - at bedside. Updated on plan of care.   -Planning for discharge to SNF. Precert initiated.     COURTNEY MARIN

## 2025-04-15 NOTE — CARE PLAN
The patient's goals for the shift include      The clinical goals for the shift include Patient will remain free from injury and rest comfortably this shift

## 2025-04-16 LAB
ALBUMIN SERPL BCP-MCNC: 3.5 G/DL (ref 3.4–5)
ALP SERPL-CCNC: 100 U/L (ref 33–136)
ALT SERPL W P-5'-P-CCNC: 13 U/L (ref 7–45)
ANION GAP SERPL CALC-SCNC: 15 MMOL/L (ref 10–20)
AST SERPL W P-5'-P-CCNC: 17 U/L (ref 9–39)
BILIRUB SERPL-MCNC: 0.9 MG/DL (ref 0–1.2)
BUN SERPL-MCNC: 10 MG/DL (ref 6–23)
CALCIUM SERPL-MCNC: 8.8 MG/DL (ref 8.6–10.3)
CHLORIDE SERPL-SCNC: 96 MMOL/L (ref 98–107)
CO2 SERPL-SCNC: 27 MMOL/L (ref 21–32)
CREAT SERPL-MCNC: 0.65 MG/DL (ref 0.5–1.05)
EGFRCR SERPLBLD CKD-EPI 2021: 90 ML/MIN/1.73M*2
ERYTHROCYTE [DISTWIDTH] IN BLOOD BY AUTOMATED COUNT: 13.1 % (ref 11.5–14.5)
GLUCOSE BLD MANUAL STRIP-MCNC: 163 MG/DL (ref 74–99)
GLUCOSE BLD MANUAL STRIP-MCNC: 199 MG/DL (ref 74–99)
GLUCOSE BLD MANUAL STRIP-MCNC: 205 MG/DL (ref 74–99)
GLUCOSE BLD MANUAL STRIP-MCNC: 260 MG/DL (ref 74–99)
GLUCOSE SERPL-MCNC: 170 MG/DL (ref 74–99)
HCT VFR BLD AUTO: 35.6 % (ref 36–46)
HGB BLD-MCNC: 12.5 G/DL (ref 12–16)
MAGNESIUM SERPL-MCNC: 1.44 MG/DL (ref 1.6–2.4)
MCH RBC QN AUTO: 30 PG (ref 26–34)
MCHC RBC AUTO-ENTMCNC: 35.1 G/DL (ref 32–36)
MCV RBC AUTO: 86 FL (ref 80–100)
NRBC BLD-RTO: 0 /100 WBCS (ref 0–0)
PLATELET # BLD AUTO: 121 X10*3/UL (ref 150–450)
POTASSIUM SERPL-SCNC: 3.7 MMOL/L (ref 3.5–5.3)
PROT SERPL-MCNC: 7.1 G/DL (ref 6.4–8.2)
Q ONSET: 220 MS
QRS COUNT: 14 BEATS
QRS DURATION: 74 MS
QT INTERVAL: 382 MS
QTC CALCULATION(BAZETT): 446 MS
QTC FREDERICIA: 424 MS
R AXIS: 76 DEGREES
RBC # BLD AUTO: 4.16 X10*6/UL (ref 4–5.2)
SODIUM SERPL-SCNC: 134 MMOL/L (ref 136–145)
T AXIS: 51 DEGREES
T OFFSET: 411 MS
VENTRICULAR RATE: 82 BPM
WBC # BLD AUTO: 9 X10*3/UL (ref 4.4–11.3)

## 2025-04-16 PROCEDURE — 80053 COMPREHEN METABOLIC PANEL: CPT | Performed by: FAMILY MEDICINE

## 2025-04-16 PROCEDURE — 2500000004 HC RX 250 GENERAL PHARMACY W/ HCPCS (ALT 636 FOR OP/ED): Mod: JZ | Performed by: FAMILY MEDICINE

## 2025-04-16 PROCEDURE — 2500000002 HC RX 250 W HCPCS SELF ADMINISTERED DRUGS (ALT 637 FOR MEDICARE OP, ALT 636 FOR OP/ED): Performed by: FAMILY MEDICINE

## 2025-04-16 PROCEDURE — 99232 SBSQ HOSP IP/OBS MODERATE 35: CPT | Performed by: INTERNAL MEDICINE

## 2025-04-16 PROCEDURE — 97530 THERAPEUTIC ACTIVITIES: CPT | Mod: GP,CQ

## 2025-04-16 PROCEDURE — 2500000001 HC RX 250 WO HCPCS SELF ADMINISTERED DRUGS (ALT 637 FOR MEDICARE OP): Performed by: FAMILY MEDICINE

## 2025-04-16 PROCEDURE — 36415 COLL VENOUS BLD VENIPUNCTURE: CPT | Performed by: FAMILY MEDICINE

## 2025-04-16 PROCEDURE — 94760 N-INVAS EAR/PLS OXIMETRY 1: CPT

## 2025-04-16 PROCEDURE — 92526 ORAL FUNCTION THERAPY: CPT | Mod: GN

## 2025-04-16 PROCEDURE — 83735 ASSAY OF MAGNESIUM: CPT | Performed by: FAMILY MEDICINE

## 2025-04-16 PROCEDURE — 2500000004 HC RX 250 GENERAL PHARMACY W/ HCPCS (ALT 636 FOR OP/ED): Mod: JZ | Performed by: NURSE PRACTITIONER

## 2025-04-16 PROCEDURE — 82947 ASSAY GLUCOSE BLOOD QUANT: CPT

## 2025-04-16 PROCEDURE — 1100000001 HC PRIVATE ROOM DAILY

## 2025-04-16 PROCEDURE — 85027 COMPLETE CBC AUTOMATED: CPT | Performed by: FAMILY MEDICINE

## 2025-04-16 RX ORDER — MAGNESIUM SULFATE 4 G/50ML
4 INJECTION INTRAVENOUS ONCE
Status: COMPLETED | OUTPATIENT
Start: 2025-04-16 | End: 2025-04-16

## 2025-04-16 RX ADMIN — Medication 1 APPLICATION: at 09:33

## 2025-04-16 RX ADMIN — POTASSIUM CHLORIDE 20 MEQ: 1500 TABLET, EXTENDED RELEASE ORAL at 09:33

## 2025-04-16 RX ADMIN — MAGNESIUM SULFATE HEPTAHYDRATE 4 G: 4 INJECTION, SOLUTION INTRAVENOUS at 09:35

## 2025-04-16 RX ADMIN — INSULIN LISPRO 2 UNITS: 100 INJECTION, SOLUTION INTRAVENOUS; SUBCUTANEOUS at 12:22

## 2025-04-16 RX ADMIN — DONEPEZIL HYDROCHLORIDE 10 MG: 5 TABLET ORAL at 21:05

## 2025-04-16 RX ADMIN — ATENOLOL 50 MG: 50 TABLET ORAL at 09:33

## 2025-04-16 RX ADMIN — DOCUSATE SODIUM 100 MG: 100 CAPSULE, LIQUID FILLED ORAL at 21:05

## 2025-04-16 RX ADMIN — INSULIN LISPRO 1 UNITS: 100 INJECTION, SOLUTION INTRAVENOUS; SUBCUTANEOUS at 17:10

## 2025-04-16 RX ADMIN — ACETAMINOPHEN 650 MG: 650 SOLUTION ORAL at 17:09

## 2025-04-16 RX ADMIN — MIRTAZAPINE 15 MG: 15 TABLET, FILM COATED ORAL at 21:05

## 2025-04-16 RX ADMIN — ENOXAPARIN SODIUM 40 MG: 40 INJECTION SUBCUTANEOUS at 17:09

## 2025-04-16 RX ADMIN — Medication 1 APPLICATION: at 21:27

## 2025-04-16 RX ADMIN — DOCUSATE SODIUM 100 MG: 100 CAPSULE, LIQUID FILLED ORAL at 09:33

## 2025-04-16 RX ADMIN — INSULIN LISPRO 1 UNITS: 100 INJECTION, SOLUTION INTRAVENOUS; SUBCUTANEOUS at 09:35

## 2025-04-16 ASSESSMENT — COGNITIVE AND FUNCTIONAL STATUS - GENERAL
MOVING FROM LYING ON BACK TO SITTING ON SIDE OF FLAT BED WITH BEDRAILS: A LOT
CLIMB 3 TO 5 STEPS WITH RAILING: TOTAL
WALKING IN HOSPITAL ROOM: TOTAL
TURNING FROM BACK TO SIDE WHILE IN FLAT BAD: A LOT
TOILETING: A LOT
STANDING UP FROM CHAIR USING ARMS: TOTAL
MOBILITY SCORE: 10
STANDING UP FROM CHAIR USING ARMS: A LOT
CLIMB 3 TO 5 STEPS WITH RAILING: TOTAL
MOVING TO AND FROM BED TO CHAIR: A LOT
EATING MEALS: A LITTLE
PERSONAL GROOMING: A LITTLE
DRESSING REGULAR LOWER BODY CLOTHING: A LOT
HELP NEEDED FOR BATHING: A LOT
DAILY ACTIVITIY SCORE: 14
MOBILITY SCORE: 8
MOVING FROM LYING ON BACK TO SITTING ON SIDE OF FLAT BED WITH BEDRAILS: A LOT
MOVING TO AND FROM BED TO CHAIR: TOTAL
TURNING FROM BACK TO SIDE WHILE IN FLAT BAD: A LOT
WALKING IN HOSPITAL ROOM: TOTAL
DRESSING REGULAR UPPER BODY CLOTHING: A LOT

## 2025-04-16 ASSESSMENT — PAIN - FUNCTIONAL ASSESSMENT
PAIN_FUNCTIONAL_ASSESSMENT: 0-10
PAIN_FUNCTIONAL_ASSESSMENT: WONG-BAKER FACES
PAIN_FUNCTIONAL_ASSESSMENT: 0-10
PAIN_FUNCTIONAL_ASSESSMENT: 0-10

## 2025-04-16 ASSESSMENT — PAIN SCALES - GENERAL
PAINLEVEL_OUTOF10: 0 - NO PAIN

## 2025-04-16 ASSESSMENT — PAIN SCALES - WONG BAKER: WONGBAKER_NUMERICALRESPONSE: NO HURT

## 2025-04-16 NOTE — DOCUMENTATION CLARIFICATION NOTE
"    PATIENT:               JENNIFER FLANAGAN  ACCT #:                  [id number]  MRN:                       [id number]  :                       1945  ADMIT DATE:       2025 11:16 AM  DISCH DATE:  RESPONDING PROVIDER #:        [id number]          PROVIDER RESPONSE TEXT:    Low platelets, not requiring treatment or evaluation    CDI QUERY TEXT:    Clarification    Question: Is there a diagnosis indicative of the lab values?    When answering this query, please exercise your independent professional judgment. The fact that a question is being asked, does not imply that any particular answer is desired or expected.    The patient's clinical indicators include:  Clinical Information: 81 y/o female admitted since  with weakness.    Clinical Indicators:    -  Platelets: 103, 90, 110, 121     Progress note: \"Platelets are low, but stable, since admission:    Treatment: daily monitoring    Risk Factors: N/A  Options provided:  -- Thrombocytopenia is clinically significant and required treatment/monitoring  -- Low platelets, not requiring treatment or evaluation  -- Other - I will add my own diagnosis  -- Refer to Clinical Documentation Reviewer    Query created by: Roxanne Adkins on 2025 11:00 AM      Electronically signed by:  TIMOTHY TOVAR MD 2025 11:17 AM          "

## 2025-04-16 NOTE — CARE PLAN
The patient's goals for the shift include      The clinical goals for the shift include Patient will rest comfortably and remain free from injury this shift

## 2025-04-16 NOTE — PROGRESS NOTES
Patient: Inge Clark  Room/bed: 120/120-A  Admitted on: 2025    Age: 80 y.o.   Gender: female  Code Status:  Full Code   Admitting Dx: Hypomagnesemia [E83.42]  Weakness [R53.1]  Diarrhea, unspecified type [R19.7]    MRN: [id number]  PCP: PRAVIN Haskins-CNS       Subjective   Patient seen and examined this AM in her room. Patient is lying on the bed with her  at bedside reports that no changes on her condition since yesterday but much better than on admission. She still weak during the exam.  Patient  was asking about possible rehab placement.She  remains confused to Place and time . She denies any N/V ,F/C.     Objective    Physical Exam  Constitutional:       Appearance: Normal appearance.      Comments: Alert only to self. Does not know , place, or time.    HENT:      Head: Normocephalic.   Eyes:      Extraocular Movements: Extraocular movements intact.   Cardiovascular:      Rate and Rhythm: Normal rate and regular rhythm.      Pulses: Normal pulses.   Pulmonary:      Effort: Pulmonary effort is normal.      Breath sounds: Normal breath sounds.   Abdominal:      General: Bowel sounds are normal.      Palpations: Abdomen is soft.      Comments: + BM    Musculoskeletal:      Cervical back: Normal range of motion.      Comments: Generalized weakness   Skin:     General: Skin is warm.   Neurological:      General: No focal deficit present.      Mental Status: She is disoriented.      Comments: Confused, disoriented to Place, time .   No changes in her LOC.    Psychiatric:         Mood and Affect: Mood normal.          Temp:  [36.6 °C (97.9 °F)-37 °C (98.6 °F)] 37 °C (98.6 °F)  Heart Rate:  [81-90] 81  Resp:  [18-20] 18  BP: (106-123)/(63-76) 106/63    Vitals:    25 1745   Weight: 59.1 kg (130 lb 3.2 oz)             I/Os    Intake/Output Summary (Last 24 hours) at 2025 1034  Last data filed at 2025 0601  Gross per 24 hour   Intake 425 ml   Output 600 ml   Net -175 ml        Labs:   Results from last 72 hours   Lab Units 04/16/25  0734 04/15/25  0625 04/14/25  0717   SODIUM mmol/L 134* 136 137   POTASSIUM mmol/L 3.7 3.5 3.3*   CHLORIDE mmol/L 96* 100 101   CO2 mmol/L 27 24 28   BUN mg/dL 10 8 12   CREATININE mg/dL 0.65 0.62 0.68   GLUCOSE mg/dL 170* 163* 148*   CALCIUM mg/dL 8.8 8.4* 7.8*   ANION GAP mmol/L 15 16 11   EGFR mL/min/1.73m*2 90 >90 89      Results from last 72 hours   Lab Units 04/16/25  0734 04/15/25  0625 04/14/25  0717 04/13/25  1121   WBC AUTO x10*3/uL 9.0 8.5 7.4 10.1   HEMOGLOBIN g/dL 12.5 12.9 11.9* 13.3   HEMATOCRIT % 35.6* 40.9 35.2* 38.4   PLATELETS AUTO x10*3/uL 121* 110* 90* 103*   NEUTROS PCT AUTO %  --   --   --  67.0   LYMPHS PCT AUTO %  --   --   --  23.5   MONOS PCT AUTO %  --   --   --  7.0   EOS PCT AUTO %  --   --   --  2.0      Lab Results   Component Value Date    CALCIUM 8.8 04/16/2025    PHOS 2.1 (L) 08/13/2022          Micro/ID:     Lab Results   Component Value Date    URINECULTURE  04/13/2025     Growth indicates contamination with periurethral howard. Repeat culture if clinically indicated.       Images:  ECG 12 lead  Atrial fibrillation with a competing junctional pacemaker  Nonspecific ST abnormality  Abnormal ECG  When compared with ECG of 11-AUG-2022 20:04,  Previous ECG has undetermined rhythm, needs review       Meds    Scheduled medications  Scheduled Medications[1]  Continuous medications  Continuous Medications[2]  PRN medications  PRN Medications[3]     Assessment and Plan    Inge Clark is a 80 y.o. female with PMH of HTN, HLD, T2DM with recent dysphagia that present to ED with poor PO intake and concern of hyperglycemia. She is been admitted for generalized weakness .      Generalized weakness:   - Likely secondary to electrolytes imbalance, poor oral intake, and poor self-care related her advanced dementia.  - Maintain Fall precautions .  - PT/OT, Nutrition, and SLP consulted.  - Treat underlying condition.  -   reports overall improvement but on exam, remains very weak and deconditioned.      CV: HTN ,HLD :  - On atenolol, atrovastatin   - VSS     T2DM:   - Sliding scale and accucheck Q4.   - Recent HgbA1c 7.7     Hypo ( magnesemia / kalemia) :  - Replaced with Mg 4 gm / Potassium 20 mEq.   - Today mg 1. 44 with replacement   - Today K 3.7 resolved.   - Daily CMP , Mg      Dysphagia:   - Aspiration precautions   - Consultation to speech therapy  recommended  adult diet consistent Carb ; CCD 60gm/meal; soft&bite sized 6 ; 1:1 feeding.     Vascular dementia:   - On donepezil and mirtazapine.  - No acute aggressive behaviors noted at this time.  - Supportive care     DVT  prophylaxis :  -On Lovenox      Fluids/ electrolytes / nutritions:   - Laboratory data reviewed.   - Electrolytes reviewed.   - Nutritional concerns as noted above.      Disposition:   -Plan of care discussed with attending, Dr. Awad.  - at bedside. Updated on plan of care.   -Planning for discharge to SNF. Precert initiated.       COURTNEY MARIN I was present during examination and have reviewed above plan of care and in agreement. Discussed with RN, TCC, and attending, Dr. Awad.    Radha Eli, APRN-CNP         [1] atenolol, 50 mg, oral, Daily  atorvastatin, 40 mg, oral, Daily  docusate sodium, 100 mg, oral, BID  donepezil, 10 mg, oral, Nightly  enoxaparin, 40 mg, subcutaneous, q24h  insulin lispro, 0-5 Units, subcutaneous, TID AC  magnesium sulfate, 4 g, intravenous, Once  mirtazapine, 15 mg, oral, Nightly  potassium chloride CR, 20 mEq, oral, Daily  zinc oxide, 1 Application, Topical, BID  [2]    [3] PRN medications: acetaminophen **OR** acetaminophen **OR** [DISCONTINUED] acetaminophen, alum-mag hydroxide-simeth, melatonin, ondansetron ODT **OR** ondansetron

## 2025-04-16 NOTE — PROGRESS NOTES
04/16/25 1208   Discharge Planning   Living Arrangements Spouse/significant other   Support Systems Spouse/significant other;Children   Assistance Needed patient is A&Ox2 with confusion, needs assistance with ADL's, uses a walker when able PCP- PRAVIN Haskins-CNS   Type of Residence Private residence   Number of Stairs to Enter Residence 3   Number of Stairs Within Residence 0   Do you have animals or pets at home? No   Who is requesting discharge planning? Provider   Home or Post Acute Services Post acute facilities (Rehab/SNF/etc)   Type of Post Acute Facility Services Skilled nursing   Expected Discharge Disposition SNF  (thania stein [facility]- requested precert be submitted by direct precert team with PT note from 4/16, OT note from 4/15)   Does the patient need discharge transport arranged? Yes   RoundTrip coordination needed? Yes   Has discharge transport been arranged? No     4/16/2025 6483: Provider made aware auth is back-- will plan for DC tomorrow 4/17. Called spouse and son to make them aware.

## 2025-04-16 NOTE — PROGRESS NOTES
Speech-Language Pathology    Speech-Language Pathology Clinical Swallow Treatment and Discharge Summary    Patient Name: Inge Clark  MRN: [id number]  : 1945  Today's Date: 25  Start Time: 1525  Stop Time: 1544  Time Calculation (min): 19 min    ASSESSMENT  SLP TX Intervention Outcome: Making Progress Towards Goals  Treatment Tolerance: Patient tolerated treatment well    Impressions:   Managing current diet without obvious aspiration.   Her appetite is diminished but eats with assist of her .   PLAN  Is MBSS recommended? No; no pharyngeal dysphagia suspected.  Recommended solid consistency: Soft/bite-sized (IDDSI level 6)  Recommended liquid consistency: Thin (IDDSI 0)  Recommended medication administration: Whole in thin liquid, Whole in puree  Safe swallow strategies:  - Upright positioning for all PO intake  - Slow rate of intake  - Small bites  - Small sips    Discharge recommendation: Home with no further SLP  Inpatient/Swing Bed or Outpatient: Inpatient  SLP Frequency: One time visit  Duration: 1 week  Patient/Caregiver Agreeable: Yes    SUBJECTIVE  Prior to Session Communication: Bedside nurse  RN cleared pt to participate in session and reported that her  was here earlier.   Respiratory status: Room air  Positioning: Upright in bed  Pt was alert and cooperative for session.    Pain Assessment  Pain Assessment: 0-10  0-10 (Numeric) Pain Score: 0 - No pain  Woodruff-Baker FACES Pain Rating: No hurt       Orientation: Oriented to self  Ability to follow functional commands: WFL    OBJECTIVE  Therapeutic Swallow Intervention : PO Trials  3 ounce water challenge. Oral trials of Soft and bite sized diet/ IDDSI level 6. Good oral and pharyngeal management with assist.     Treatment/Education:  Results and recommendations were relayed to: Patient  Education provided: Yes   Learner: Patient   Barriers to learning: Cognitive limitations barrier   Method of teaching: Verbal and  Written   Topic: encouraging intake, upright posture to eat safely.    Outcome of teaching: Needs reinforcement     Goals (start date 4/14/2025):  - Patient will tolerate current diet without noted pulmonary compromise or evidence of pulmonary sequela as noted in patient chart and/or reported by patient/family.              Status: appetite is diminished, but oral and pharyngeal management was wfl for the assessment today.               Progress this date: yes

## 2025-04-16 NOTE — PROGRESS NOTES
Physical Therapy    Physical Therapy Treatment    Patient Name: Inge Clark  MRN: [id number]  Department: [address]  Room: [address] [place] [address]  Today's Date: 4/16/2025  Time Calculation  Start Time: 1010  Stop Time: 1033  Time Calculation (min): 23 min         Assessment/Plan   PT Assessment  PT Assessment Results: Decreased strength, Decreased mobility, Decreased cognition  Rehab Prognosis: Fair  Barriers to Discharge Home: Caregiver assistance, Cognition needs, Physical needs  Caregiver Assistance: Caregiver assistance needed per identified barriers - however, level of patient's required assistance exceeds assistance available at home  Cognition Needs: 24hr supervision for safety awareness needed, Cognition-related high falls risk  Physical Needs: 24hr mobility assistance needed, High falls risk due to function or environment  End of Session Communication: Bedside nurse  Assessment Comment: dejon to recommend Mod intensity skilled PT dependent upon patient cognition and participation for trnasfers, gait, and dynamic sitting/standing balance training to return to PLOF  End of Session Patient Position: Bed, 3 rail up, Alarm on     PT Plan  Treatment/Interventions: Bed mobility, Transfer training, Gait training, Strengthening, Therapeutic exercise  PT Plan: Ongoing PT  PT Frequency: 3 times per week  PT Discharge Recommendations: Moderate intensity level of continued care  Equipment Recommended upon Discharge: Wheeled walker  PT Recommended Transfer Status: Assist x1  PT - OK to Discharge: Yes      General Visit Information:   PT  Visit  PT Received On: 04/16/25  General  Reason for Referral: 79 yo female admitted 2' to hypomagnesemia, diarrhea  Referred By: Dr. ELIN Blake  Past Medical History Relevant to Rehab: T2 DM, Vascular dementia, HTN, HLD, recent dysphagia  Family/Caregiver Present: Yes  Caregiver Feedback:   Prior to Session Communication: Bedside nurse  Patient Position Received: Bed, 3 rail up, Alarm  off, not on at start of session  General Comment: AXOX1, lethargic, easily aroused to name and followed simple instructions. spouse at bedside. (decresed command with incresed lethargy to the point she fell asleep sitting edge of bed with head slump down and minimal trunk control. Hd to be returned to bed dependently. ARoused to name the whole time but would not stay alert and on task.)    Subjective   Precautions:  Precautions  Medical Precautions: Fall precautions (Merrittck, IV, masimo)            Objective   Pain:  Pain Assessment  Pain Assessment: Woodruff-Baker FACES  Woodruff-Baker FACES Pain Rating: No hurt  Cognition:  Cognition  Overall Cognitive Status: Impaired at baseline  Orientation Level: Disoriented to situation, Disoriented to time, Disoriented to place  Coordination:     Postural Control:  Postural Control  Postural Control: Impaired  Head Control: poor (forward)  Trunk Control: poor (retro-leaning)  Righting Reactions: delayed  Static Sitting Balance  Static Sitting-Balance Support: Bilateral upper extremity supported, Feet supported  Static Sitting-Level of Assistance: Moderate assistance, Minimum assistance (for retro lean)  Static Sitting-Comment/Number of Minutes: 7 minutes  Static Standing Balance  Static Standing-Balance Support: Bilateral upper extremity supported (FWW)  Static Standing-Level of Assistance: Contact guard (x 2)  Dynamic Standing Balance  Dynamic Standing-Balance Support: Bilateral upper extremity supported (FWW)  Dynamic Standing-Level of Assistance: Contact guard (x 2)  Dynamic Standing-Balance:  (slight retro lean)  Extremity/Trunk Assessments:    Activity Tolerance:  Activity Tolerance  Endurance: Tolerates 10 - 20 min exercise with multiple rests  Treatments:  Therapeutic Exercise  Therapeutic Exercise Performed: Yes    Aquatic Exercise  Aquatic Exercise Performed: No (no carry over)    Therapeutic Activity  Therapeutic Activity Performed: No                   Bed Mobility  Bed  Mobility: Yes  Bed Mobility 1  Bed Mobility 1: Supine to sitting  Level of Assistance 1: Moderate assistance, Moderate tactile cues, Moderate verbal cues  Bed Mobility 2  Bed Mobility  2: Sitting to supine  Level of Assistance 2: Dependent, +2, Maximum verbal cues, Maximum tactile cues  Bed Mobility 3  Bed Mobility 3: Scooting  Level of Assistance 3: Dependent, +2, Maximum tactile cues, Maximum verbal cues    Ambulation/Gait Training  Ambulation/Gait Training Performed: No  Ambulation/Gait Training 1  Assistance 1:  (x 2)  Transfers  Transfer: Yes  Transfer 1  Transfer From 1: Sit to  Transfer to 1: Stand  Technique 1: Sit to stand, Stand to sit  Transfer Level of Assistance 1: Hand held assistance, Maximum assistance, Maximum verbal cues, Maximum tactile cues (x 2)  Trials/Comments 1: x3 (unable to stand, pateitn would retro -lean, push feet forward and resist.)    Stairs  Stairs: No                     Outcome Measures:  [facility] Basic Mobility  Turning from your back to your side while in a flat bed without using bedrails: A lot  Moving from lying on your back to sitting on the side of a flat bed without using bedrails: A lot  Moving to and from bed to chair (including a wheelchair): Total  Standing up from a chair using your arms (e.g. wheelchair or bedside chair): Total  To walk in hospital room: Total  Climbing 3-5 steps with railing: Total  Basic Mobility - Total Score: 8    Education Documentation  Precautions, taught by Flora Up PTA at 4/16/2025 11:42 AM.  Learner: Patient  Readiness: Nonacceptance  Method: Demonstration, Explanation  Response: Needs Reinforcement  Comment: dementia and no carry over from patient    Body Mechanics, taught by Flora Up PTA at 4/16/2025 11:42 AM.  Learner: Patient  Readiness: Nonacceptance  Method: Demonstration, Explanation  Response: Needs Reinforcement  Comment: dementia and no carry over from patient    ADL Training, taught by Flora Up PTA at 4/16/2025 11:42  AM.  Learner: Patient  Readiness: Nonacceptance  Method: Demonstration, Explanation  Response: Needs Reinforcement  Comment: dementia and no carry over from patient    Education Comments  No comments found.        OP EDUCATION:       Encounter Problems       Encounter Problems (Active)       Mobility       STG - Patient will ambulate 50-80 feet close supervision with FWW (Not Progressing)       Start:  04/14/25    Expected End:  04/28/25               PT Transfers       STG - Patient to transfer to and from sit to supine close supervision (Progressing)       Start:  04/14/25    Expected End:  04/28/25            STG - Patient will transfer sit to and from stand close supervision with FWW (Not Progressing)       Start:  04/14/25    Expected End:  04/28/25               Pain - Adult          Safety       LTG - Patient will adhere to hip precautions during ADL's and transfers       Start:  04/13/25            LTG - Patient will demonstrate safety requirements appropriate to situation/environment       Start:  04/13/25            LTG - Patient will utilize safety techniques       Start:  04/13/25            STG - Patient locks brakes on wheelchair       Start:  04/13/25            STG - Patient uses call light consistently to request assistance with transfers       Start:  04/13/25            STG - Patient uses gait belt during all transfers       Start:  04/13/25            Goal 1       Start:  04/13/25            Goal 2       Start:  04/13/25            Goal 3       Start:  04/13/25

## 2025-04-17 ENCOUNTER — APPOINTMENT (OUTPATIENT)
Dept: RADIOLOGY | Facility: HOSPITAL | Age: 80
End: 2025-04-17
Payer: MEDICARE

## 2025-04-17 VITALS
OXYGEN SATURATION: 91 % | SYSTOLIC BLOOD PRESSURE: 111 MMHG | DIASTOLIC BLOOD PRESSURE: 60 MMHG | HEIGHT: 63 IN | WEIGHT: 130.2 LBS | RESPIRATION RATE: 18 BRPM | BODY MASS INDEX: 23.07 KG/M2 | TEMPERATURE: 100 F | HEART RATE: 102 BPM

## 2025-04-17 PROBLEM — E83.42 HYPOMAGNESEMIA: Status: RESOLVED | Noted: 2024-08-15 | Resolved: 2025-04-17

## 2025-04-17 LAB
ANION GAP SERPL CALC-SCNC: 17 MMOL/L (ref 10–20)
BUN SERPL-MCNC: 14 MG/DL (ref 6–23)
CALCIUM SERPL-MCNC: 9.3 MG/DL (ref 8.6–10.3)
CHLORIDE SERPL-SCNC: 94 MMOL/L (ref 98–107)
CO2 SERPL-SCNC: 25 MMOL/L (ref 21–32)
CREAT SERPL-MCNC: 0.85 MG/DL (ref 0.5–1.05)
EGFRCR SERPLBLD CKD-EPI 2021: 70 ML/MIN/1.73M*2
ERYTHROCYTE [DISTWIDTH] IN BLOOD BY AUTOMATED COUNT: 13.1 % (ref 11.5–14.5)
GLUCOSE BLD MANUAL STRIP-MCNC: 178 MG/DL (ref 74–99)
GLUCOSE BLD MANUAL STRIP-MCNC: 202 MG/DL (ref 74–99)
GLUCOSE SERPL-MCNC: 188 MG/DL (ref 74–99)
HCT VFR BLD AUTO: 39.5 % (ref 36–46)
HGB BLD-MCNC: 13.5 G/DL (ref 12–16)
MCH RBC QN AUTO: 30.8 PG (ref 26–34)
MCHC RBC AUTO-ENTMCNC: 34.2 G/DL (ref 32–36)
MCV RBC AUTO: 90 FL (ref 80–100)
NRBC BLD-RTO: 0 /100 WBCS (ref 0–0)
PLATELET # BLD AUTO: 115 X10*3/UL (ref 150–450)
POTASSIUM SERPL-SCNC: 3.9 MMOL/L (ref 3.5–5.3)
RBC # BLD AUTO: 4.39 X10*6/UL (ref 4–5.2)
SODIUM SERPL-SCNC: 132 MMOL/L (ref 136–145)
WBC # BLD AUTO: 12.5 X10*3/UL (ref 4.4–11.3)

## 2025-04-17 PROCEDURE — 71046 X-RAY EXAM CHEST 2 VIEWS: CPT | Performed by: RADIOLOGY

## 2025-04-17 PROCEDURE — 2500000002 HC RX 250 W HCPCS SELF ADMINISTERED DRUGS (ALT 637 FOR MEDICARE OP, ALT 636 FOR OP/ED): Performed by: FAMILY MEDICINE

## 2025-04-17 PROCEDURE — 2500000004 HC RX 250 GENERAL PHARMACY W/ HCPCS (ALT 636 FOR OP/ED): Performed by: NURSE PRACTITIONER

## 2025-04-17 PROCEDURE — 2500000001 HC RX 250 WO HCPCS SELF ADMINISTERED DRUGS (ALT 637 FOR MEDICARE OP): Performed by: FAMILY MEDICINE

## 2025-04-17 PROCEDURE — 80048 BASIC METABOLIC PNL TOTAL CA: CPT | Performed by: NURSE PRACTITIONER

## 2025-04-17 PROCEDURE — 71046 X-RAY EXAM CHEST 2 VIEWS: CPT

## 2025-04-17 PROCEDURE — 82947 ASSAY GLUCOSE BLOOD QUANT: CPT

## 2025-04-17 PROCEDURE — 36415 COLL VENOUS BLD VENIPUNCTURE: CPT | Performed by: NURSE PRACTITIONER

## 2025-04-17 PROCEDURE — 85027 COMPLETE CBC AUTOMATED: CPT | Performed by: NURSE PRACTITIONER

## 2025-04-17 PROCEDURE — 99239 HOSP IP/OBS DSCHRG MGMT >30: CPT | Performed by: INTERNAL MEDICINE

## 2025-04-17 RX ORDER — TALC
3 POWDER (GRAM) TOPICAL NIGHTLY PRN
Start: 2025-04-17

## 2025-04-17 RX ORDER — ATENOLOL 50 MG/1
50 TABLET ORAL DAILY
Start: 2025-04-17 | End: 2025-04-30 | Stop reason: SDUPTHER

## 2025-04-17 RX ORDER — METHYLPREDNISOLONE 4 MG/1
20 TABLET ORAL DAILY
Start: 2025-04-18 | End: 2025-04-19

## 2025-04-17 RX ORDER — METHYLPREDNISOLONE 4 MG/1
24 TABLET ORAL ONCE
Status: COMPLETED | OUTPATIENT
Start: 2025-04-17 | End: 2025-04-17

## 2025-04-17 RX ORDER — METHYLPREDNISOLONE 4 MG/1
4 TABLET ORAL ONCE
Status: DISCONTINUED | OUTPATIENT
Start: 2025-04-22 | End: 2025-04-17 | Stop reason: HOSPADM

## 2025-04-17 RX ORDER — METHYLPREDNISOLONE 4 MG/1
12 TABLET ORAL ONCE
Status: DISCONTINUED | OUTPATIENT
Start: 2025-04-20 | End: 2025-04-17 | Stop reason: HOSPADM

## 2025-04-17 RX ORDER — ACETAMINOPHEN 325 MG/1
650 TABLET ORAL EVERY 6 HOURS PRN
Start: 2025-04-17

## 2025-04-17 RX ORDER — DOCUSATE SODIUM 100 MG/1
100 CAPSULE, LIQUID FILLED ORAL 2 TIMES DAILY
Start: 2025-04-17

## 2025-04-17 RX ORDER — METHYLPREDNISOLONE 4 MG/1
20 TABLET ORAL ONCE
Status: DISCONTINUED | OUTPATIENT
Start: 2025-04-18 | End: 2025-04-17 | Stop reason: HOSPADM

## 2025-04-17 RX ORDER — METHYLPREDNISOLONE 4 MG/1
8 TABLET ORAL ONCE
Status: DISCONTINUED | OUTPATIENT
Start: 2025-04-21 | End: 2025-04-17 | Stop reason: HOSPADM

## 2025-04-17 RX ORDER — EAR PLUGS
1 EACH OTIC (EAR) 2 TIMES DAILY
Start: 2025-04-17

## 2025-04-17 RX ORDER — INSULIN LISPRO 100 [IU]/ML
0-5 INJECTION, SOLUTION INTRAVENOUS; SUBCUTANEOUS
Start: 2025-04-17

## 2025-04-17 RX ORDER — METHYLPREDNISOLONE 4 MG/1
16 TABLET ORAL ONCE
Status: DISCONTINUED | OUTPATIENT
Start: 2025-04-19 | End: 2025-04-17 | Stop reason: HOSPADM

## 2025-04-17 RX ORDER — ENOXAPARIN SODIUM 100 MG/ML
40 INJECTION SUBCUTANEOUS EVERY 24 HOURS
Start: 2025-04-17

## 2025-04-17 RX ADMIN — METHYLPREDNISOLONE 24 MG: 4 TABLET ORAL at 12:11

## 2025-04-17 RX ADMIN — INSULIN LISPRO 1 UNITS: 100 INJECTION, SOLUTION INTRAVENOUS; SUBCUTANEOUS at 09:05

## 2025-04-17 RX ADMIN — POTASSIUM CHLORIDE 20 MEQ: 1500 TABLET, EXTENDED RELEASE ORAL at 09:05

## 2025-04-17 RX ADMIN — ATORVASTATIN CALCIUM 40 MG: 40 TABLET, FILM COATED ORAL at 09:04

## 2025-04-17 RX ADMIN — ATENOLOL 50 MG: 50 TABLET ORAL at 09:04

## 2025-04-17 RX ADMIN — Medication 1 APPLICATION: at 09:05

## 2025-04-17 RX ADMIN — INSULIN LISPRO 2 UNITS: 100 INJECTION, SOLUTION INTRAVENOUS; SUBCUTANEOUS at 12:12

## 2025-04-17 RX ADMIN — DOCUSATE SODIUM 100 MG: 100 CAPSULE, LIQUID FILLED ORAL at 09:05

## 2025-04-17 ASSESSMENT — COGNITIVE AND FUNCTIONAL STATUS - GENERAL
DAILY ACTIVITIY SCORE: 14
MOBILITY SCORE: 10
WALKING IN HOSPITAL ROOM: TOTAL
TURNING FROM BACK TO SIDE WHILE IN FLAT BAD: A LOT
CLIMB 3 TO 5 STEPS WITH RAILING: TOTAL
DRESSING REGULAR UPPER BODY CLOTHING: A LOT
DRESSING REGULAR LOWER BODY CLOTHING: A LOT
MOVING FROM LYING ON BACK TO SITTING ON SIDE OF FLAT BED WITH BEDRAILS: A LOT
STANDING UP FROM CHAIR USING ARMS: A LOT
EATING MEALS: A LITTLE
HELP NEEDED FOR BATHING: A LOT
TOILETING: A LOT
MOVING TO AND FROM BED TO CHAIR: A LOT
PERSONAL GROOMING: A LITTLE

## 2025-04-17 ASSESSMENT — PAIN SCALES - GENERAL: PAINLEVEL_OUTOF10: 0 - NO PAIN

## 2025-04-17 NOTE — PROGRESS NOTES
04/17/25 1223   Discharge Planning   Living Arrangements Spouse/significant other   Support Systems Spouse/significant other;Children   Assistance Needed patient is A&Ox2 with confusion, needs assistance with ADL's, uses a walker when able PCP- PRAVIN Haskins-[facility]   Type of Residence Private residence   Number of Stairs to Enter Residence 3   Number of Stairs Within Residence 0   Do you have animals or pets at home? No   Who is requesting discharge planning? Provider   Home or Post Acute Services Post acute facilities (Rehab/SNF/etc)   Type of Post Acute Facility Services Skilled nursing   Expected Discharge Disposition SNF  ([facility])   Does the patient need discharge transport arranged? Yes   RoundTrip coordination needed? Yes   Has discharge transport been arranged? Yes   What day is the transport expected? 04/17/25   What time is the transport expected? 1400     4/17/2025 1205: Spouse at bedside updated about discharge arrangements listed above. Bedside nurse provided with report number.

## 2025-04-17 NOTE — CARE PLAN
The patient's goals for the shift include      The clinical goals for the shift include pt will remain afebrile during shift

## 2025-04-17 NOTE — DISCHARGE SUMMARY
Discharge Diagnosis  Hypomagnesemia    Issues Requiring Follow-Up  Medical Management, Strength and Conditioning at SNF    Discharge Meds     Medication List      START taking these medications     acetaminophen 325 mg tablet; Commonly known as: Tylenol; Take 2 tablets   (650 mg) by mouth every 6 hours if needed for mild pain (1 - 3) or   headaches.   atenolol 50 mg tablet; Commonly known as: Tenormin; Take 1 tablet (50   mg) by mouth once daily.   docusate sodium 100 mg capsule; Commonly known as: Colace; Take 1   capsule (100 mg) by mouth 2 times a day.   enoxaparin 40 mg/0.4 mL syringe; Commonly known as: Lovenox; Inject 0.4   mL (40 mg) under the skin once every 24 hours.   insulin lispro 100 unit/mL injection; Inject 0-5 Units under the skin 3   times a day before meals. Take as directed per insulin instructions.   melatonin 3 mg tablet; Take 1 tablet (3 mg) by mouth as needed at   bedtime for sleep.   methylPREDNISolone 4 mg tablet; Commonly known as: Medrol; Take 5   tablets (20 mg) by mouth once daily for 1 day. Do not fill before April 18, 2025.; Start taking on: April 18, 2025   zinc oxide 40 % ointment ointment; Apply 1 Application topically 2 times   a day.     CONTINUE taking these medications     atorvastatin 40 mg tablet; Commonly known as: Lipitor; Take 1 tablet (40   mg) by mouth once daily.   blood-glucose,,cont misc; Use as instructed   donepezil 10 mg tablet; Commonly known as: Aricept; Take 1 tablet (10   mg) by mouth once daily at bedtime.   FreeStyle Elsie 14 Day Sensor kit; Generic drug: flash glucose sensor   kit; Use as instructed   mirtazapine 15 mg tablet; Commonly known as: Remeron; Take 1 tablet (15   mg) by mouth once daily at bedtime.   OneTouch Delica Plus Lancet 30 gauge misc; Generic drug: lancets   OneTouch Ultra Test; Generic drug: blood sugar diagnostic; TEST ONCE   DAILY   OneTouch Ultra2 Meter misc; Generic drug: blood-glucose meter; Apply 1   kit topically once  daily.   potassium chloride CR 20 mEq ER tablet; Commonly known as: Klor-Con M20;   Take 1 tablet (20 mEq) by mouth once daily. Do not crush or chew.     STOP taking these medications     atenoloL-chlorthalidone 100-25 mg tablet; Commonly known as: Tenoretic   metFORMIN 1,000 mg tablet; Commonly known as: Glucophage   pioglitazone 15 mg tablet; Commonly known as: Actos   polyethylene glycol 17 gram packet; Commonly known as: Glycolax, Miralax       Test Results Pending At Discharge  Pending Labs       No current pending labs.            Hospital Course  Inge Clark is a 80 y.o. female with PMH of HTN, HLD, T2DM with recent dysphagia that present to ED with poor PO intake and concern of hyperglycemia. She is been admitted for generalized weakness, likely secondary to electrolytes imbalance, poor oral intake, and poor self-care related her advanced dementia. PT/OT/SLP, nutrition services were all consulted. No evidence of acute infectious process. SLP recommended dietary modifications with 1:1 feeding.  reports overall improvement but on exam, remains very weak and deconditioned. Electrolytes monitored and replaced as needed. Hemodynamically stable for discharge to SNF.     Medical Management:     Right Wrist Pain with Erythema  - Suspect related to gout  - Added Medrol Dose Pack   - Supportive care with Ice, elevation     CV: HTN ,HLD:  - On atenolol, atrovastatin   - VSS     T2DM:   - Sliding scale and accucheck Q4.   - Recent HgbA1c 7.7     Dysphagia:   - Aspiration precautions   - Consultation to speech therapy  recommended  adult diet consistent Carb ; CCD 60gm/meal; soft&bite sized 6 ; 1:1 feeding.     Vascular dementia:   - On donepezil and mirtazapine.  - No acute aggressive behaviors noted at this time.  - Supportive care     DVT  prophylaxis :  -On Lovenox      Fluids/ electrolytes / nutrition:   - Laboratory data reviewed: CBC/BMP.   - Electrolytes reviewed.   - Nutritional concerns as noted  above.      Disposition:   Plan of care discussed with attending, Dr. Awad. Seen and examined in her room this AM. Awake and alert but remains very confused.  present and feels she is more aggressive with him today. She is c/o right wrist pain. No recurrence of fever. No new complaints. Medications reviewed and reconciled. Scripts prepared as needed. Greater than 35 minutes spent in coordination of care, including physical examination, chart review, medication reconciliation, collaboration with providers, staff, and family.     Pertinent Physical Exam At Time of Discharge  Physical Exam  Constitutional: Alert to self; acute pain in right wrist; restless. Cooperative. Significant STML.   Eyes: EOM's intact  HEENT: Normocephalic, Atraumatic. Oral mucosa moist.   Neck: Supple. No JVD, lymphadenopathy.   Lungs: CTAB with fair air movement. Respirations even and unlabored on room air.   Heart: RRR  Abdomen: Soft, non-tender, non-distended, +BS  MS/Extremities: DUKES x 4 with right wrist pain, edema, erythema, and tenderness. No edema. Peripheral pulses intact bilaterally. Profound weakness.   Neuro: Alert to self; no focal deficits; gross motor and sensation intact. Cognitive impairment.   Skin: Warm and dry. No rashes or lesions  Psych: Normal affect.      Outpatient Follow-Up  Future Appointments   Date Time Provider Department [place]   5/22/2025  8:30 AM NISHI Baker [id number] [facility]   6/2/2025  2:15 PM Amna Knight PA-C [id number] [facility]   7/30/2025  9:00 AM TRICIA Haskins [id number] [facility]   10/13/2025  1:20 PM TRICIA Haskins [id number] [facility]         NISHI Del Valle

## 2025-04-17 NOTE — PROGRESS NOTES
Occupational Therapy                 Therapy Communication Note    Patient Name: Inge Clark  MRN: [id number]  Department: [address]  Room: [address] [place], [place] [address]  Today's Date: 4/17/2025     Discipline: Occupational Therapy    OT Missed Visit: Yes     Missed Visit Reason: Patient refused (Patient refused treatment pending discharge to SNF later this afternoon. Communicated with nurse.)    Missed Time: Attempt @ 1341

## 2025-04-21 ENCOUNTER — TELEPHONE (OUTPATIENT)
Dept: HOME HEALTH SERVICES | Facility: HOME HEALTH | Age: 80
End: 2025-04-21
Payer: MEDICARE

## 2025-04-21 NOTE — TELEPHONE ENCOUNTER
Home Care received a referral for Nursing, Physical Therapy, Occupational Therapy, and Speech Language Pathology. Unfortunately, we are unable to accept and process the referral at this time.    Reason:  Admitted to the hospital and then discharged to a post acute facility    Patients, please reach out to the referring provider or your PCP to assist in obtaining an alternative home care agency and/or guidance to meet your needs.    Providers, please reach out to  Home Care at [phone] with any questions regarding the declined referral.

## 2025-04-28 ENCOUNTER — TELEPHONE (OUTPATIENT)
Dept: PRIMARY CARE | Facility: CLINIC | Age: 80
End: 2025-04-28
Payer: MEDICARE

## 2025-04-28 NOTE — TELEPHONE ENCOUNTER
Patient is being discharged from [place] at [facility] and they want her to see Maryann. They are giving her a higher dose of oral medication and the NP wants her monitored closely. Please call to schedule?

## 2025-04-29 ENCOUNTER — TELEPHONE (OUTPATIENT)
Dept: PRIMARY CARE | Facility: CLINIC | Age: 80
End: 2025-04-29
Payer: MEDICARE

## 2025-04-29 ENCOUNTER — PATIENT OUTREACH (OUTPATIENT)
Dept: PRIMARY CARE | Facility: CLINIC | Age: 80
End: 2025-04-29
Payer: MEDICARE

## 2025-04-29 NOTE — TELEPHONE ENCOUNTER
----- Message from Jessica DALLAS sent at 4/29/2025  9:48 AM EDT -----  Regarding: FW: TCM completed. No open times for me to schedule within 13 days    ----- Message -----  From: Venu He RN  Sent: 4/29/2025   9:37 AM EDT  To: Do [id number] Clerical  Subject: TCM completed. No open times for me to sched#    TCM completed. No open times for me to schedule within 13 days. Can you please contact pt to schedule hosp follow up within 13 days of discharge.This patient was discharged from: [facility] diagnosis: DM2, Dementia  Date of discharge: 28 Apr 25     Thank you

## 2025-04-29 NOTE — PROGRESS NOTES
Discharge Facility: [facility]  Discharge Diagnosis: Hypomagnesemia, Dementia   Admission Date: 13 Apr 25 (Isabel)  Discharge Date: 28 Apr 25 ([facility])    PCP Appointment Date: Tasked to office  Specialist Appointment Date: 22 May 25 (Quinn Owen)  Hospital Encounter and Summary Linked: Yes  ED to Hosp-Admission (Discharged) with Shannan Awad MD; Charan Gaston MD; Ana Blake DO (04/13/2025)     See discharge assessment below for further details     Wrap Up  Wrap Up Additional Comments:  stating patient is doing well so far since discharge.  able to obtain all newly prescribed medications without difficulty.  did not have any questions regarding medications or discharge instructions at this time.  confirms that [facility] is set to see them tomorrow. message sent to PCP office in regards to scheduling.  ok with contact in 2 wks. (4/29/2025  9:38 AM)  Call End Time: 0938 (4/29/2025  9:38 AM)    Engagement  Call Start Time: 0928 (Spoke with  (Deacon)) (4/29/2025  9:38 AM)    Medications  Medications reviewed with patient/caregiver?: Yes (4/29/2025  9:38 AM)  Is the patient having any side effects they believe may be caused by any medication additions or changes?: No (4/29/2025  9:38 AM)  Does the patient have all medications ordered at discharge?: Yes (4/29/2025  9:38 AM)  Prescription Comments:  able to obtain all newly prescribed medications without difficulty (4/29/2025  9:38 AM)  Is the patient taking all medications as directed (includes completed medication regime)?: Yes (4/29/2025  9:38 AM)  Medication Comments:  has no questions in regards to medications or discharge instructions (4/29/2025  9:38 AM)    Appointments  Does the patient have a primary care provider?: Yes (Tasked to office) (4/29/2025  9:38 AM)  Has the patient kept scheduled appointments due by today?: Yes (4/29/2025  9:38 AM)    Self Management  What is the  home health agency?: [facility] set to see patient tomorrow per  (4/29/2025  9:38 AM)  Has home health visited the patient within 72 hours of discharge?: Call prior to 72 hours (4/29/2025  9:38 AM)  What Durable Medical Equipment (DME) was ordered?: None (4/29/2025  9:38 AM)    Patient Teaching  Does the patient have access to their discharge instructions?: Yes (4/29/2025  9:38 AM)  Care Management Interventions: Reviewed instructions with patient (4/29/2025  9:38 AM)  What is the patient's perception of their health status since discharge?: Improving (4/29/2025  9:38 AM)  Is the patient/caregiver able to teach back the hierarchy of who to call/visit for symptoms/problems? PCP, Specialist, Home Health nurse, Urgent Care, ED, 911: Yes (4/29/2025  9:38 AM)  Patient/Caregiver Education Comments: None (4/29/2025  9:38 AM)

## 2025-04-29 NOTE — DOCUMENTATION CLARIFICATION NOTE
"    PATIENT:               [facility] FLANAGAN  [id number]  MRN:                       [id number]  :                       1945  ADMIT DATE:       2025 11:16 AM  DISCH DATE:        2025 2:42 PM  RESPONDING PROVIDER #:        [id number]          PROVIDER RESPONSE TEXT:    No evidence of AMARI    CDI QUERY TEXT:    Clarification        Instruction:    Based on your assessment of the patient and the clinical information, please provide the requested documentation by clicking on the appropriate radio button and enter any additional information if prompted.    Question: Please clarify a diagnosis for the patient renal status    When answering this query, please exercise your independent professional judgment. The fact that a question is being asked, does not imply that any particular answer is desired or expected.    The patient's clinical indicators include:  Clinical Information: 80 Yr F arrives to the ED with c/o high BG, shaking/twitching.    Clinical Indicators:  - Creatinine: 1.07, 0.68, 0.62, 0.65, 0.85  - BUN: 24, 12, 8, 10, 14   ED PN: \"Patient has had decreased p.o. intake has not been able to get around by herself which is not her baseline.\"   HP: \"Recent dysphagia that presented to the ED with poor PO intake. patient was been having some swallowing problems and has therefore not been eating as well. Generalized weakness likely secondary to poor PO intake. Start IV fluids at maintenance.\"  4/15 CNP Mullet: \"Generalized weakness likely secondary to electrolytes imbalance, poor oral intake, and poor self-care related her her dementia.\"    Treatment: Daily BMP,  1,000 ML LR IVF bolus, NS IVF at 75 ML/HR.    Risk Factors: Vascular dementia; dysphagia  Options provided:  -- Acute Kidney Injury  -- Other - I will add my own diagnosis  -- Refer to Clinical Documentation Reviewer    Query created by: Madison Juan on 2025 1:01 PM      Electronically signed " by:  PAUL COSTELLO-CNP 4/29/2025 2:21 PM

## 2025-04-29 NOTE — DOCUMENTATION CLARIFICATION NOTE
"    PATIENT:               JENNIFER FLANAGAN  ACCT #:                  [id number]  MRN:                       [id number]  :                       1945  ADMIT DATE:       2025 11:16 AM  DISCH DATE:        2025 2:42 PM  RESPONDING PROVIDER #:        [id number]          PROVIDER RESPONSE TEXT:    Acute impaired functional mobility likely secondary to poor oral intake, electrolyte imbalance, and progressive/advanced disease process    CDI QUERY TEXT:    Clarification        Instruction:    Based on your assessment of the patient and the clinical information, please provide the requested documentation by clicking on the appropriate radio button and enter any additional information if prompted.    Question: Is there a diagnosis indicative of the patients documented debility requiring total assistance with all ADL/IADLs    When answering this query, please exercise your independent professional judgment. The fact that a question is being asked, does not imply that any particular answer is desired or expected.    The patient's clinical indicators include:  Clinical Information: 80 Yr F arrives to the ED with c/o high BG, shaking/twitching.    Clinical Indicators:  - Nursing ADL assessments: \"Needs assistance, can't safely complete daily activities, use of assistive devices.\"  - AM-PAC daily activity score 14; AM-PAC mobility score 10   ED Triage: Presented in wheelchair, family concerned patient may need facility care of patient moving forward.    \"Per , patient has become weak and it is becoming difficult to take care of her. Generalized weakness likely secondary to poor PO intake. Start IV fluids at maintenance. Vascular dementia.\"   Williams RN: \"Needs assistance with ADL's, uses a walker when able.\"   SLP Rosario: \"She eats better these days when she is fed.\"   Marcin PT:  \"Decreased strength, Decreased mobility, Decreased cognition. Rehab Prognosis: Fair. Cognition " "Needs: 24hr supervision for safety awareness needed, Cognition-related high falls risk. Physical Needs: 24hr mobility assistance needed. Pt is exhibiting severe dementia.\"  4/14 Francisco OT: \"24hr supervision for safety awareness needed, Medication and/or medical management daily assist needed, Insight of patient limited regarding functional ability/needs, Cognition-related high falls risk.\"  4/15 JO Schulerlet: \"Generalized weakness likely secondary to electrolytes imbalance, poor oral intake, and poor self-care related her her dementia.\"  4/17 DC Summary: \"SLP recommended dietary modifications with 1:1 feeding. Remains very weak and deconditioned.\"    Treatment: PT/OT/SLP evaluation, discharge to SNF    Risk Factors: Vascular dementia, dysphagia  Options provided:  -- Complete immobility due to severe physical disability or frailty  -- Functional quadriplegia  -- Other - I will add my own diagnosis  -- Refer to Clinical Documentation Reviewer    Query created by: Madison Juan on 4/21/2025 1:11 PM      Electronically signed by:  PAUL LION APRMORIS-CNP 4/29/2025 2:21 PM          "

## 2025-04-30 ENCOUNTER — TELEPHONE (OUTPATIENT)
Dept: PRIMARY CARE | Facility: CLINIC | Age: 80
End: 2025-04-30
Payer: MEDICARE

## 2025-04-30 DIAGNOSIS — E11.69 DM TYPE 2 WITH DIABETIC MIXED HYPERLIPIDEMIA (MULTI): Primary | ICD-10-CM

## 2025-04-30 DIAGNOSIS — E78.2 DM TYPE 2 WITH DIABETIC MIXED HYPERLIPIDEMIA (MULTI): Primary | ICD-10-CM

## 2025-04-30 DIAGNOSIS — I10 PRIMARY HYPERTENSION: ICD-10-CM

## 2025-04-30 RX ORDER — GLIPIZIDE 2.5 MG/1
2.5 TABLET ORAL DAILY
Qty: 30 TABLET | Refills: 11 | Status: SHIPPED | OUTPATIENT
Start: 2025-04-30 | End: 2026-04-30

## 2025-04-30 RX ORDER — ATENOLOL 50 MG/1
50 TABLET ORAL DAILY
Qty: 30 TABLET | Refills: 2 | Status: SHIPPED | OUTPATIENT
Start: 2025-04-30

## 2025-05-02 ENCOUNTER — TELEPHONE (OUTPATIENT)
Dept: PRIMARY CARE | Facility: CLINIC | Age: 80
End: 2025-05-02
Payer: MEDICARE

## 2025-05-06 ENCOUNTER — APPOINTMENT (OUTPATIENT)
Dept: RADIOLOGY | Facility: HOSPITAL | Age: 80
DRG: 070 | End: 2025-05-06
Payer: MEDICARE

## 2025-05-06 ENCOUNTER — HOSPITAL ENCOUNTER (INPATIENT)
Facility: HOSPITAL | Age: 80
DRG: 070 | End: 2025-05-06
Attending: STUDENT IN AN ORGANIZED HEALTH CARE EDUCATION/TRAINING PROGRAM | Admitting: INTERNAL MEDICINE
Payer: MEDICARE

## 2025-05-06 ENCOUNTER — APPOINTMENT (OUTPATIENT)
Dept: CARDIOLOGY | Facility: HOSPITAL | Age: 80
DRG: 070 | End: 2025-05-06
Payer: MEDICARE

## 2025-05-06 DIAGNOSIS — Z96.641 PRESENCE OF RIGHT ARTIFICIAL HIP JOINT: ICD-10-CM

## 2025-05-06 DIAGNOSIS — R63.4 WEIGHT LOSS, UNINTENTIONAL: ICD-10-CM

## 2025-05-06 DIAGNOSIS — I82.220 ACUTE DEEP VEIN THROMBOSIS (DVT) OF INFERIOR VENA CAVA (MULTI): ICD-10-CM

## 2025-05-06 DIAGNOSIS — I10 BENIGN HYPERTENSION: ICD-10-CM

## 2025-05-06 DIAGNOSIS — N12 PYELONEPHRITIS: ICD-10-CM

## 2025-05-06 DIAGNOSIS — A32.9: ICD-10-CM

## 2025-05-06 DIAGNOSIS — R53.1 GENERALIZED WEAKNESS: ICD-10-CM

## 2025-05-06 DIAGNOSIS — Z13.21 ENCOUNTER FOR VITAMIN DEFICIENCY SCREENING: ICD-10-CM

## 2025-05-06 DIAGNOSIS — G54.2 DISORDER OF RIGHT CERVICAL NERVE ROOT: ICD-10-CM

## 2025-05-06 DIAGNOSIS — G93.41 METABOLIC ENCEPHALOPATHY: ICD-10-CM

## 2025-05-06 DIAGNOSIS — R50.9 FEVER AND CHILLS: ICD-10-CM

## 2025-05-06 DIAGNOSIS — E11.9 TYPE 2 DIABETES MELLITUS WITHOUT COMPLICATION, WITHOUT LONG-TERM CURRENT USE OF INSULIN: ICD-10-CM

## 2025-05-06 DIAGNOSIS — E55.9 VITAMIN D DEFICIENCY: ICD-10-CM

## 2025-05-06 DIAGNOSIS — B02.9 HERPES ZOSTER WITHOUT COMPLICATION: ICD-10-CM

## 2025-05-06 DIAGNOSIS — R63.0 POOR APPETITE: ICD-10-CM

## 2025-05-06 DIAGNOSIS — M47.812 CERVICAL ARTHRITIS: ICD-10-CM

## 2025-05-06 DIAGNOSIS — E11.69 MIXED DIABETIC HYPERLIPIDEMIA ASSOCIATED WITH TYPE 2 DIABETES MELLITUS: ICD-10-CM

## 2025-05-06 DIAGNOSIS — Z01.89 ENCOUNTER FOR GERIATRIC ASSESSMENT: ICD-10-CM

## 2025-05-06 DIAGNOSIS — Z00.00 MEDICARE ANNUAL WELLNESS VISIT, SUBSEQUENT: ICD-10-CM

## 2025-05-06 DIAGNOSIS — R62.7 ADULT FAILURE TO THRIVE: Primary | ICD-10-CM

## 2025-05-06 DIAGNOSIS — R65.10 SIRS (SYSTEMIC INFLAMMATORY RESPONSE SYNDROME) (MULTI): ICD-10-CM

## 2025-05-06 DIAGNOSIS — R29.6 MULTIPLE FALLS: ICD-10-CM

## 2025-05-06 DIAGNOSIS — E78.5 HYPERLIPIDEMIA, UNSPECIFIED HYPERLIPIDEMIA TYPE: ICD-10-CM

## 2025-05-06 DIAGNOSIS — E87.6 HYPOKALEMIA: ICD-10-CM

## 2025-05-06 DIAGNOSIS — R26.81 UNSTEADINESS ON FEET: ICD-10-CM

## 2025-05-06 DIAGNOSIS — L98.9 SKIN LESION: ICD-10-CM

## 2025-05-06 DIAGNOSIS — R69 TAKING MULTIPLE MEDICATIONS FOR CHRONIC DISEASE: ICD-10-CM

## 2025-05-06 DIAGNOSIS — R42 DIZZINESS: ICD-10-CM

## 2025-05-06 DIAGNOSIS — I82.90 THROMBUS: ICD-10-CM

## 2025-05-06 DIAGNOSIS — F01.C3 SEVERE VASCULAR DEMENTIA WITH MOOD DISTURBANCE: ICD-10-CM

## 2025-05-06 DIAGNOSIS — R41.89 IMPAIRED COGNITION: ICD-10-CM

## 2025-05-06 DIAGNOSIS — E78.2 MIXED DIABETIC HYPERLIPIDEMIA ASSOCIATED WITH TYPE 2 DIABETES MELLITUS: ICD-10-CM

## 2025-05-06 LAB
ALBUMIN SERPL BCP-MCNC: 3.8 G/DL (ref 3.4–5)
ALP SERPL-CCNC: 93 U/L (ref 33–136)
ALT SERPL W P-5'-P-CCNC: 28 U/L (ref 7–45)
ANION GAP BLDV CALCULATED.4IONS-SCNC: 12 MMOL/L (ref 10–25)
ANION GAP SERPL CALC-SCNC: 17 MMOL/L (ref 10–20)
APPEARANCE UR: CLEAR
APTT PPP: 30 SECONDS (ref 26–36)
APTT PPP: 32 SECONDS (ref 26–36)
AST SERPL W P-5'-P-CCNC: 22 U/L (ref 9–39)
BASE EXCESS BLDV CALC-SCNC: 4.2 MMOL/L (ref -2–3)
BASOPHILS # BLD AUTO: 0.02 X10*3/UL (ref 0–0.1)
BASOPHILS NFR BLD AUTO: 0.2 %
BILIRUB DIRECT SERPL-MCNC: 0.3 MG/DL (ref 0–0.3)
BILIRUB SERPL-MCNC: 1.3 MG/DL (ref 0–1.2)
BILIRUB UR STRIP.AUTO-MCNC: NEGATIVE MG/DL
BODY TEMPERATURE: ABNORMAL
BUN SERPL-MCNC: 22 MG/DL (ref 6–23)
CA-I BLDV-SCNC: 1.15 MMOL/L (ref 1.1–1.33)
CALCIUM SERPL-MCNC: 8.9 MG/DL (ref 8.6–10.3)
CARDIAC TROPONIN I PNL SERPL HS: 8 NG/L (ref 0–13)
CARDIAC TROPONIN I PNL SERPL HS: 8 NG/L (ref 0–13)
CHLORIDE BLDV-SCNC: 96 MMOL/L (ref 98–107)
CHLORIDE SERPL-SCNC: 93 MMOL/L (ref 98–107)
CO2 SERPL-SCNC: 26 MMOL/L (ref 21–32)
COLOR UR: YELLOW
CREAT SERPL-MCNC: 1.17 MG/DL (ref 0.5–1.05)
EGFRCR SERPLBLD CKD-EPI 2021: 48 ML/MIN/1.73M*2
EOSINOPHIL # BLD AUTO: 0.09 X10*3/UL (ref 0–0.4)
EOSINOPHIL NFR BLD AUTO: 0.8 %
ERYTHROCYTE [DISTWIDTH] IN BLOOD BY AUTOMATED COUNT: 13.7 % (ref 11.5–14.5)
FLUAV RNA RESP QL NAA+PROBE: NOT DETECTED
FLUBV RNA RESP QL NAA+PROBE: NOT DETECTED
GLUCOSE BLDV-MCNC: 337 MG/DL (ref 74–99)
GLUCOSE SERPL-MCNC: 309 MG/DL (ref 74–99)
GLUCOSE UR STRIP.AUTO-MCNC: ABNORMAL MG/DL
HCO3 BLDV-SCNC: 26.9 MMOL/L (ref 22–26)
HCT VFR BLD AUTO: 32.6 % (ref 36–46)
HCT VFR BLD EST: 36 % (ref 36–46)
HGB BLD-MCNC: 11.4 G/DL (ref 12–16)
HGB BLDV-MCNC: 11.9 G/DL (ref 12–16)
HOLD SPECIMEN: 293
IMM GRANULOCYTES # BLD AUTO: 0.04 X10*3/UL (ref 0–0.5)
IMM GRANULOCYTES NFR BLD AUTO: 0.3 % (ref 0–0.9)
INHALED O2 CONCENTRATION: 21 %
INR PPP: 1.5 (ref 0.9–1.1)
INR PPP: 1.5 (ref 0.9–1.1)
KETONES UR STRIP.AUTO-MCNC: NEGATIVE MG/DL
LACTATE BLDV-SCNC: 3.2 MMOL/L (ref 0.4–2)
LACTATE SERPL-SCNC: 2 MMOL/L (ref 0.4–2)
LACTATE SERPL-SCNC: 3.3 MMOL/L (ref 0.4–2)
LEUKOCYTE ESTERASE UR QL STRIP.AUTO: NEGATIVE
LIPASE SERPL-CCNC: 20 U/L (ref 9–82)
LYMPHOCYTES # BLD AUTO: 0.68 X10*3/UL (ref 0.8–3)
LYMPHOCYTES NFR BLD AUTO: 5.9 %
MAGNESIUM SERPL-MCNC: 1.27 MG/DL (ref 1.6–2.4)
MCH RBC QN AUTO: 30.5 PG (ref 26–34)
MCHC RBC AUTO-ENTMCNC: 35 G/DL (ref 32–36)
MCV RBC AUTO: 87 FL (ref 80–100)
MONOCYTES # BLD AUTO: 0.65 X10*3/UL (ref 0.05–0.8)
MONOCYTES NFR BLD AUTO: 5.6 %
MRSA DNA SPEC QL NAA+PROBE: NOT DETECTED
NEUTROPHILS # BLD AUTO: 10.13 X10*3/UL (ref 1.6–5.5)
NEUTROPHILS NFR BLD AUTO: 87.2 %
NITRITE UR QL STRIP.AUTO: NEGATIVE
NRBC BLD-RTO: 0 /100 WBCS (ref 0–0)
OXYHGB MFR BLDV: 89.3 % (ref 45–75)
PCO2 BLDV: 33 MM HG (ref 41–51)
PH BLDV: 7.52 PH (ref 7.33–7.43)
PH UR STRIP.AUTO: 5.5 [PH]
PLATELET # BLD AUTO: 77 X10*3/UL (ref 150–450)
PO2 BLDV: 58 MM HG (ref 35–45)
POLYCHROMASIA BLD QL SMEAR: NORMAL
POTASSIUM BLDV-SCNC: 2.8 MMOL/L (ref 3.5–5.3)
POTASSIUM SERPL-SCNC: 2.8 MMOL/L (ref 3.5–5.3)
PROT SERPL-MCNC: 7.8 G/DL (ref 6.4–8.2)
PROT UR STRIP.AUTO-MCNC: ABNORMAL MG/DL
PROTHROMBIN TIME: 16.4 SECONDS (ref 9.8–12.4)
PROTHROMBIN TIME: 16.7 SECONDS (ref 9.8–12.4)
RBC # BLD AUTO: 3.74 X10*6/UL (ref 4–5.2)
RBC # UR STRIP.AUTO: NEGATIVE MG/DL
RBC #/AREA URNS AUTO: NORMAL /HPF
RBC MORPH BLD: NORMAL
RSV RNA RESP QL NAA+PROBE: NOT DETECTED
SAO2 % BLDV: 92 % (ref 45–75)
SARS-COV-2 RNA RESP QL NAA+PROBE: NOT DETECTED
SODIUM BLDV-SCNC: 132 MMOL/L (ref 136–145)
SODIUM SERPL-SCNC: 133 MMOL/L (ref 136–145)
SP GR UR STRIP.AUTO: 1.02
UFH PPP CHRO-ACNC: 0.9 IU/ML (ref ?–1.1)
UROBILINOGEN UR STRIP.AUTO-MCNC: NORMAL MG/DL
WBC # BLD AUTO: 11.6 X10*3/UL (ref 4.4–11.3)
WBC #/AREA URNS AUTO: NORMAL /HPF

## 2025-05-06 PROCEDURE — 96374 THER/PROPH/DIAG INJ IV PUSH: CPT | Mod: 59

## 2025-05-06 PROCEDURE — 85520 HEPARIN ASSAY: CPT | Performed by: NURSE PRACTITIONER

## 2025-05-06 PROCEDURE — 83735 ASSAY OF MAGNESIUM: CPT | Performed by: NURSE PRACTITIONER

## 2025-05-06 PROCEDURE — 84484 ASSAY OF TROPONIN QUANT: CPT | Performed by: NURSE PRACTITIONER

## 2025-05-06 PROCEDURE — 85025 COMPLETE CBC W/AUTO DIFF WBC: CPT | Performed by: NURSE PRACTITIONER

## 2025-05-06 PROCEDURE — 84132 ASSAY OF SERUM POTASSIUM: CPT | Performed by: NURSE PRACTITIONER

## 2025-05-06 PROCEDURE — 83605 ASSAY OF LACTIC ACID: CPT | Performed by: NURSE PRACTITIONER

## 2025-05-06 PROCEDURE — 85730 THROMBOPLASTIN TIME PARTIAL: CPT | Performed by: NURSE PRACTITIONER

## 2025-05-06 PROCEDURE — 96367 TX/PROPH/DG ADDL SEQ IV INF: CPT

## 2025-05-06 PROCEDURE — 74177 CT ABD & PELVIS W/CONTRAST: CPT

## 2025-05-06 PROCEDURE — 71046 X-RAY EXAM CHEST 2 VIEWS: CPT | Performed by: RADIOLOGY

## 2025-05-06 PROCEDURE — 87641 MR-STAPH DNA AMP PROBE: CPT | Performed by: NURSE PRACTITIONER

## 2025-05-06 PROCEDURE — 70450 CT HEAD/BRAIN W/O DYE: CPT

## 2025-05-06 PROCEDURE — 81001 URINALYSIS AUTO W/SCOPE: CPT | Performed by: NURSE PRACTITIONER

## 2025-05-06 PROCEDURE — 85610 PROTHROMBIN TIME: CPT | Performed by: STUDENT IN AN ORGANIZED HEALTH CARE EDUCATION/TRAINING PROGRAM

## 2025-05-06 PROCEDURE — 85610 PROTHROMBIN TIME: CPT | Performed by: NURSE PRACTITIONER

## 2025-05-06 PROCEDURE — 93971 EXTREMITY STUDY: CPT | Performed by: STUDENT IN AN ORGANIZED HEALTH CARE EDUCATION/TRAINING PROGRAM

## 2025-05-06 PROCEDURE — 70450 CT HEAD/BRAIN W/O DYE: CPT | Performed by: STUDENT IN AN ORGANIZED HEALTH CARE EDUCATION/TRAINING PROGRAM

## 2025-05-06 PROCEDURE — 96366 THER/PROPH/DIAG IV INF ADDON: CPT

## 2025-05-06 PROCEDURE — 99285 EMERGENCY DEPT VISIT HI MDM: CPT | Mod: 25 | Performed by: STUDENT IN AN ORGANIZED HEALTH CARE EDUCATION/TRAINING PROGRAM

## 2025-05-06 PROCEDURE — 85730 THROMBOPLASTIN TIME PARTIAL: CPT | Performed by: STUDENT IN AN ORGANIZED HEALTH CARE EDUCATION/TRAINING PROGRAM

## 2025-05-06 PROCEDURE — 2500000001 HC RX 250 WO HCPCS SELF ADMINISTERED DRUGS (ALT 637 FOR MEDICARE OP): Performed by: NURSE PRACTITIONER

## 2025-05-06 PROCEDURE — 85060 BLOOD SMEAR INTERPRETATION: CPT | Performed by: PATHOLOGY

## 2025-05-06 PROCEDURE — 1200000002 HC GENERAL ROOM WITH TELEMETRY DAILY

## 2025-05-06 PROCEDURE — 87637 SARSCOV2&INF A&B&RSV AMP PRB: CPT | Performed by: NURSE PRACTITIONER

## 2025-05-06 PROCEDURE — 99223 1ST HOSP IP/OBS HIGH 75: CPT

## 2025-05-06 PROCEDURE — 2500000004 HC RX 250 GENERAL PHARMACY W/ HCPCS (ALT 636 FOR OP/ED): Mod: JZ | Performed by: NURSE PRACTITIONER

## 2025-05-06 PROCEDURE — 71260 CT THORAX DX C+: CPT | Performed by: STUDENT IN AN ORGANIZED HEALTH CARE EDUCATION/TRAINING PROGRAM

## 2025-05-06 PROCEDURE — 83690 ASSAY OF LIPASE: CPT | Performed by: NURSE PRACTITIONER

## 2025-05-06 PROCEDURE — 82248 BILIRUBIN DIRECT: CPT

## 2025-05-06 PROCEDURE — 36415 COLL VENOUS BLD VENIPUNCTURE: CPT | Performed by: NURSE PRACTITIONER

## 2025-05-06 PROCEDURE — 93005 ELECTROCARDIOGRAM TRACING: CPT

## 2025-05-06 PROCEDURE — 2500000001 HC RX 250 WO HCPCS SELF ADMINISTERED DRUGS (ALT 637 FOR MEDICARE OP)

## 2025-05-06 PROCEDURE — 71046 X-RAY EXAM CHEST 2 VIEWS: CPT

## 2025-05-06 PROCEDURE — 87040 BLOOD CULTURE FOR BACTERIA: CPT | Mod: GEALAB | Performed by: NURSE PRACTITIONER

## 2025-05-06 PROCEDURE — 2550000001 HC RX 255 CONTRASTS: Performed by: STUDENT IN AN ORGANIZED HEALTH CARE EDUCATION/TRAINING PROGRAM

## 2025-05-06 PROCEDURE — 93970 EXTREMITY STUDY: CPT

## 2025-05-06 PROCEDURE — 96368 THER/DIAG CONCURRENT INF: CPT

## 2025-05-06 PROCEDURE — 99291 CRITICAL CARE FIRST HOUR: CPT | Mod: 25 | Performed by: STUDENT IN AN ORGANIZED HEALTH CARE EDUCATION/TRAINING PROGRAM

## 2025-05-06 PROCEDURE — 74177 CT ABD & PELVIS W/CONTRAST: CPT | Performed by: STUDENT IN AN ORGANIZED HEALTH CARE EDUCATION/TRAINING PROGRAM

## 2025-05-06 PROCEDURE — 96365 THER/PROPH/DIAG IV INF INIT: CPT

## 2025-05-06 RX ORDER — MIRTAZAPINE 15 MG/1
15 TABLET, FILM COATED ORAL NIGHTLY
Status: DISCONTINUED | OUTPATIENT
Start: 2025-05-06 | End: 2025-05-12 | Stop reason: HOSPADM

## 2025-05-06 RX ORDER — ATENOLOL 50 MG/1
50 TABLET ORAL DAILY
Status: DISCONTINUED | OUTPATIENT
Start: 2025-05-07 | End: 2025-05-12 | Stop reason: HOSPADM

## 2025-05-06 RX ORDER — MAGNESIUM SULFATE HEPTAHYDRATE 40 MG/ML
2 INJECTION, SOLUTION INTRAVENOUS ONCE
Status: COMPLETED | OUTPATIENT
Start: 2025-05-06 | End: 2025-05-06

## 2025-05-06 RX ORDER — POTASSIUM CHLORIDE 14.9 MG/ML
20 INJECTION INTRAVENOUS
Status: COMPLETED | OUTPATIENT
Start: 2025-05-06 | End: 2025-05-06

## 2025-05-06 RX ORDER — INSULIN LISPRO 100 [IU]/ML
0-10 INJECTION, SOLUTION INTRAVENOUS; SUBCUTANEOUS
Status: DISCONTINUED | OUTPATIENT
Start: 2025-05-06 | End: 2025-05-08

## 2025-05-06 RX ORDER — ACETAMINOPHEN 325 MG/1
650 TABLET ORAL EVERY 4 HOURS PRN
Status: DISCONTINUED | OUTPATIENT
Start: 2025-05-06 | End: 2025-05-12 | Stop reason: HOSPADM

## 2025-05-06 RX ORDER — VANCOMYCIN HYDROCHLORIDE 1 G/200ML
1000 INJECTION, SOLUTION INTRAVENOUS ONCE
Status: COMPLETED | OUTPATIENT
Start: 2025-05-06 | End: 2025-05-06

## 2025-05-06 RX ORDER — DOCUSATE SODIUM 100 MG/1
100 CAPSULE, LIQUID FILLED ORAL 2 TIMES DAILY
Status: DISCONTINUED | OUTPATIENT
Start: 2025-05-06 | End: 2025-05-07

## 2025-05-06 RX ORDER — BISACODYL 5 MG
10 TABLET, DELAYED RELEASE (ENTERIC COATED) ORAL 2 TIMES DAILY
Status: DISCONTINUED | OUTPATIENT
Start: 2025-05-06 | End: 2025-05-07

## 2025-05-06 RX ORDER — POTASSIUM CHLORIDE 1.5 G/1.58G
20 POWDER, FOR SOLUTION ORAL ONCE
Status: COMPLETED | OUTPATIENT
Start: 2025-05-06 | End: 2025-05-06

## 2025-05-06 RX ORDER — HEPARIN SODIUM 10000 [USP'U]/100ML
0-4500 INJECTION, SOLUTION INTRAVENOUS CONTINUOUS
Status: DISCONTINUED | OUTPATIENT
Start: 2025-05-06 | End: 2025-05-08

## 2025-05-06 RX ORDER — ACETAMINOPHEN 650 MG/1
650 SUPPOSITORY RECTAL EVERY 4 HOURS PRN
Status: DISCONTINUED | OUTPATIENT
Start: 2025-05-06 | End: 2025-05-12 | Stop reason: HOSPADM

## 2025-05-06 RX ORDER — ACETAMINOPHEN 160 MG/5ML
650 SOLUTION ORAL EVERY 4 HOURS PRN
Status: DISCONTINUED | OUTPATIENT
Start: 2025-05-06 | End: 2025-05-12 | Stop reason: HOSPADM

## 2025-05-06 RX ORDER — VANCOMYCIN HYDROCHLORIDE 1 G/20ML
INJECTION, POWDER, LYOPHILIZED, FOR SOLUTION INTRAVENOUS DAILY PRN
Status: DISCONTINUED | OUTPATIENT
Start: 2025-05-06 | End: 2025-05-06

## 2025-05-06 RX ORDER — ATORVASTATIN CALCIUM 40 MG/1
40 TABLET, FILM COATED ORAL DAILY
Status: DISCONTINUED | OUTPATIENT
Start: 2025-05-07 | End: 2025-05-12 | Stop reason: HOSPADM

## 2025-05-06 RX ORDER — ACETAMINOPHEN 325 MG/1
650 TABLET ORAL ONCE
Status: COMPLETED | OUTPATIENT
Start: 2025-05-06 | End: 2025-05-06

## 2025-05-06 RX ORDER — TALC
3 POWDER (GRAM) TOPICAL NIGHTLY PRN
Status: DISCONTINUED | OUTPATIENT
Start: 2025-05-06 | End: 2025-05-12 | Stop reason: HOSPADM

## 2025-05-06 RX ORDER — GLIPIZIDE 5 MG/1
2.5 TABLET ORAL DAILY
Status: DISCONTINUED | OUTPATIENT
Start: 2025-05-07 | End: 2025-05-12 | Stop reason: HOSPADM

## 2025-05-06 RX ORDER — DONEPEZIL HYDROCHLORIDE 5 MG/1
10 TABLET, FILM COATED ORAL NIGHTLY
Status: DISCONTINUED | OUTPATIENT
Start: 2025-05-06 | End: 2025-05-12 | Stop reason: HOSPADM

## 2025-05-06 RX ADMIN — HEPARIN SODIUM 1000 UNITS/HR: 10000 INJECTION, SOLUTION INTRAVENOUS at 18:52

## 2025-05-06 RX ADMIN — MIRTAZAPINE 15 MG: 15 TABLET, FILM COATED ORAL at 23:05

## 2025-05-06 RX ADMIN — VANCOMYCIN HYDROCHLORIDE 1000 MG: 1 INJECTION, SOLUTION INTRAVENOUS at 18:51

## 2025-05-06 RX ADMIN — ACETAMINOPHEN 650 MG: 325 TABLET ORAL at 16:04

## 2025-05-06 RX ADMIN — POTASSIUM CHLORIDE 20 MEQ: 14.9 INJECTION, SOLUTION INTRAVENOUS at 17:08

## 2025-05-06 RX ADMIN — BISACODYL 10 MG: 5 TABLET, COATED ORAL at 23:05

## 2025-05-06 RX ADMIN — POTASSIUM CHLORIDE 20 MEQ: 1.5 POWDER, FOR SOLUTION ORAL at 17:07

## 2025-05-06 RX ADMIN — IOHEXOL 75 ML: 350 INJECTION, SOLUTION INTRAVENOUS at 17:41

## 2025-05-06 RX ADMIN — DOCUSATE SODIUM 100 MG: 100 CAPSULE, LIQUID FILLED ORAL at 23:05

## 2025-05-06 RX ADMIN — SODIUM CHLORIDE 1000 ML: 0.9 INJECTION, SOLUTION INTRAVENOUS at 16:02

## 2025-05-06 RX ADMIN — PIPERACILLIN SODIUM AND TAZOBACTAM SODIUM 3.38 G: 3; .375 INJECTION, SOLUTION INTRAVENOUS at 16:04

## 2025-05-06 RX ADMIN — POTASSIUM CHLORIDE 20 MEQ: 14.9 INJECTION, SOLUTION INTRAVENOUS at 19:26

## 2025-05-06 RX ADMIN — MAGNESIUM SULFATE HEPTAHYDRATE 2 G: 2 INJECTION, SOLUTION INTRAVENOUS at 17:07

## 2025-05-06 RX ADMIN — DONEPEZIL HYDROCHLORIDE 10 MG: 5 TABLET ORAL at 23:05

## 2025-05-06 SDOH — ECONOMIC STABILITY: HOUSING INSECURITY: AT ANY TIME IN THE PAST 12 MONTHS, WERE YOU HOMELESS OR LIVING IN A SHELTER (INCLUDING NOW)?: PATIENT UNABLE TO ANSWER

## 2025-05-06 SDOH — SOCIAL STABILITY: SOCIAL INSECURITY
WITHIN THE LAST YEAR, HAVE YOU BEEN RAPED OR FORCED TO HAVE ANY KIND OF SEXUAL ACTIVITY BY YOUR PARTNER OR EX-PARTNER?: PATIENT UNABLE TO ANSWER

## 2025-05-06 SDOH — SOCIAL STABILITY: SOCIAL INSECURITY: DO YOU FEEL ANYONE HAS EXPLOITED OR TAKEN ADVANTAGE OF YOU FINANCIALLY OR OF YOUR PERSONAL PROPERTY?: UNABLE TO ASSESS

## 2025-05-06 SDOH — ECONOMIC STABILITY: HOUSING INSECURITY
IN THE LAST 12 MONTHS, WAS THERE A TIME WHEN YOU WERE NOT ABLE TO PAY THE MORTGAGE OR RENT ON TIME?: PATIENT UNABLE TO ANSWER

## 2025-05-06 SDOH — ECONOMIC STABILITY: HOUSING INSECURITY: IN THE PAST 12 MONTHS, HOW MANY TIMES HAVE YOU MOVED WHERE YOU WERE LIVING?: 0

## 2025-05-06 SDOH — ECONOMIC STABILITY: FOOD INSECURITY
HOW HARD IS IT FOR YOU TO PAY FOR THE VERY BASICS LIKE FOOD, HOUSING, MEDICAL CARE, AND HEATING?: PATIENT UNABLE TO ANSWER

## 2025-05-06 SDOH — ECONOMIC STABILITY: INCOME INSECURITY
IN THE PAST 12 MONTHS HAS THE ELECTRIC, GAS, OIL, OR WATER COMPANY THREATENED TO SHUT OFF SERVICES IN YOUR HOME?: PATIENT UNABLE TO ANSWER

## 2025-05-06 SDOH — SOCIAL STABILITY: SOCIAL INSECURITY: WERE YOU ABLE TO COMPLETE ALL THE BEHAVIORAL HEALTH SCREENINGS?: NO

## 2025-05-06 SDOH — ECONOMIC STABILITY: FOOD INSECURITY
WITHIN THE PAST 12 MONTHS, YOU WORRIED THAT YOUR FOOD WOULD RUN OUT BEFORE YOU GOT THE MONEY TO BUY MORE.: PATIENT UNABLE TO ANSWER

## 2025-05-06 SDOH — SOCIAL STABILITY: SOCIAL INSECURITY: HAS ANYONE EVER THREATENED TO HURT YOUR FAMILY OR YOUR PETS?: UNABLE TO ASSESS

## 2025-05-06 SDOH — SOCIAL STABILITY: SOCIAL INSECURITY: ABUSE: ADULT

## 2025-05-06 SDOH — SOCIAL STABILITY: SOCIAL INSECURITY: HAVE YOU HAD ANY THOUGHTS OF HARMING ANYONE ELSE?: UNABLE TO ASSESS

## 2025-05-06 SDOH — SOCIAL STABILITY: SOCIAL INSECURITY: ARE YOU OR HAVE YOU BEEN THREATENED OR ABUSED PHYSICALLY, EMOTIONALLY, OR SEXUALLY BY ANYONE?: UNABLE TO ASSESS

## 2025-05-06 SDOH — SOCIAL STABILITY: SOCIAL INSECURITY
WITHIN THE LAST YEAR, HAVE YOU BEEN HUMILIATED OR EMOTIONALLY ABUSED IN OTHER WAYS BY YOUR PARTNER OR EX-PARTNER?: PATIENT UNABLE TO ANSWER

## 2025-05-06 SDOH — SOCIAL STABILITY: SOCIAL INSECURITY
WITHIN THE LAST YEAR, HAVE YOU BEEN KICKED, HIT, SLAPPED, OR OTHERWISE PHYSICALLY HURT BY YOUR PARTNER OR EX-PARTNER?: PATIENT UNABLE TO ANSWER

## 2025-05-06 SDOH — ECONOMIC STABILITY: FOOD INSECURITY
WITHIN THE PAST 12 MONTHS, THE FOOD YOU BOUGHT JUST DIDN'T LAST AND YOU DIDN'T HAVE MONEY TO GET MORE.: PATIENT UNABLE TO ANSWER

## 2025-05-06 SDOH — SOCIAL STABILITY: SOCIAL INSECURITY: WITHIN THE LAST YEAR, HAVE YOU BEEN AFRAID OF YOUR PARTNER OR EX-PARTNER?: PATIENT UNABLE TO ANSWER

## 2025-05-06 SDOH — SOCIAL STABILITY: SOCIAL INSECURITY: DO YOU FEEL UNSAFE GOING BACK TO THE PLACE WHERE YOU ARE LIVING?: UNABLE TO ASSESS

## 2025-05-06 SDOH — SOCIAL STABILITY: SOCIAL INSECURITY: DOES ANYONE TRY TO KEEP YOU FROM HAVING/CONTACTING OTHER FRIENDS OR DOING THINGS OUTSIDE YOUR HOME?: UNABLE TO ASSESS

## 2025-05-06 SDOH — SOCIAL STABILITY: SOCIAL INSECURITY: HAVE YOU HAD THOUGHTS OF HARMING ANYONE ELSE?: UNABLE TO ASSESS

## 2025-05-06 SDOH — ECONOMIC STABILITY: TRANSPORTATION INSECURITY
IN THE PAST 12 MONTHS, HAS LACK OF TRANSPORTATION KEPT YOU FROM MEDICAL APPOINTMENTS OR FROM GETTING MEDICATIONS?: PATIENT UNABLE TO ANSWER

## 2025-05-06 SDOH — SOCIAL STABILITY: SOCIAL INSECURITY: ARE THERE ANY APPARENT SIGNS OF INJURIES/BEHAVIORS THAT COULD BE RELATED TO ABUSE/NEGLECT?: UNABLE TO ASSESS

## 2025-05-06 ASSESSMENT — LIFESTYLE VARIABLES
HOW OFTEN DO YOU HAVE A DRINK CONTAINING ALCOHOL: PATIENT UNABLE TO ANSWER
HOW MANY STANDARD DRINKS CONTAINING ALCOHOL DO YOU HAVE ON A TYPICAL DAY: PATIENT UNABLE TO ANSWER
AUDIT-C TOTAL SCORE: -1
HOW OFTEN DO YOU HAVE 6 OR MORE DRINKS ON ONE OCCASION: PATIENT UNABLE TO ANSWER
AUDIT-C TOTAL SCORE: -1
SKIP TO QUESTIONS 9-10: 0

## 2025-05-06 ASSESSMENT — COGNITIVE AND FUNCTIONAL STATUS - GENERAL
DAILY ACTIVITIY SCORE: 20
CLIMB 3 TO 5 STEPS WITH RAILING: A LOT
DRESSING REGULAR UPPER BODY CLOTHING: A LITTLE
STANDING UP FROM CHAIR USING ARMS: A LITTLE
DRESSING REGULAR LOWER BODY CLOTHING: A LITTLE
TURNING FROM BACK TO SIDE WHILE IN FLAT BAD: A LITTLE
MOVING TO AND FROM BED TO CHAIR: A LITTLE
PATIENT BASELINE BEDBOUND: NO
HELP NEEDED FOR BATHING: A LITTLE
MOBILITY SCORE: 16
MOVING FROM LYING ON BACK TO SITTING ON SIDE OF FLAT BED WITH BEDRAILS: A LITTLE
WALKING IN HOSPITAL ROOM: A LOT
TOILETING: A LITTLE

## 2025-05-06 ASSESSMENT — PAIN SCALES - PAIN ASSESSMENT IN ADVANCED DEMENTIA (PAINAD)
BREATHING: NORMAL
TOTALSCORE: 0
BODYLANGUAGE: RELAXED
CONSOLABILITY: NO NEED TO CONSOLE
FACIALEXPRESSION: SMILING OR INEXPRESSIVE

## 2025-05-06 ASSESSMENT — ACTIVITIES OF DAILY LIVING (ADL)
LACK_OF_TRANSPORTATION: PATIENT UNABLE TO ANSWER
ASSISTIVE_DEVICE: WALKER
GROOMING: UNABLE TO ASSESS
WALKS IN HOME: UNABLE TO ASSESS
ADEQUATE_TO_COMPLETE_ADL: YES
TOILETING: UNABLE TO ASSESS
JUDGMENT_ADEQUATE_SAFELY_COMPLETE_DAILY_ACTIVITIES: NO
PATIENT'S MEMORY ADEQUATE TO SAFELY COMPLETE DAILY ACTIVITIES?: NO
FEEDING YOURSELF: UNABLE TO ASSESS
HEARING - RIGHT EAR: FUNCTIONAL
BATHING: UNABLE TO ASSESS
HEARING - LEFT EAR: FUNCTIONAL
DRESSING YOURSELF: UNABLE TO ASSESS

## 2025-05-06 ASSESSMENT — PATIENT HEALTH QUESTIONNAIRE - PHQ9
1. LITTLE INTEREST OR PLEASURE IN DOING THINGS: NOT AT ALL
SUM OF ALL RESPONSES TO PHQ9 QUESTIONS 1 & 2: 0
2. FEELING DOWN, DEPRESSED OR HOPELESS: NOT AT ALL

## 2025-05-06 ASSESSMENT — PAIN - FUNCTIONAL ASSESSMENT: PAIN_FUNCTIONAL_ASSESSMENT: PAINAD (PAIN ASSESSMENT IN ADVANCED DEMENTIA SCALE)

## 2025-05-06 ASSESSMENT — PAIN SCALES - GENERAL: PAINLEVEL_OUTOF10: 0 - NO PAIN

## 2025-05-06 NOTE — ED TRIAGE NOTES
Pt BIB POV from home w/ daughter. Pt has h/o dementia but is increased confusion, mumbling, 6 days of no BM, and shaking her arms. Pulse elevated at home. Pt alert, verbal, pleasantly confused. No h/o CVA.

## 2025-05-06 NOTE — ED PROVIDER NOTES
[facility]  [facility]  ED  Encounter Note  Admit Date/RoomTime: 2025  3:36 PM  ED Room: 127/127-A  NAME: Inge Clark  : 1945  MRN: [id number]     Chief Complaint:  Altered Mental Status    HISTORY OF PRESENT ILLNESS        Inge Clark is a 80 y.o. female who presents to the ED for evaluation of metabolic encephalopathy.  PMH of NIDDM-type II, vascular dementia, HTN, HLD,  dysphagia that presented to the ED also for not able to ambulate well.    Both  and pt are fair historian.  Daughter stated that pt has been mumbling 6 days of no bm and shaking her arms. No longer wants to walk.    Was here a month ago for low mag, and placement. Just got out last week. Could not work with therapy few days ago. Denied trauma falls, blood thinner.      stated that she is near baseline but he has parkinson's and cannot help or do much.  She ate oatmeal okay this am and some jello.    She is DM but did not know what her glucoses have been running. Temperature 101.5 in ED.      ROS   Pertinent positives and negatives are stated within HPI, all other systems reviewed and are negative.    Past Medical History:  has a past medical history of Elevated troponin I level (08/15/2024), Essential (primary) hypertension (2022), Insect bite (nonvenomous) of scalp, initial encounter (CODE) (2017), Listeriosis, unspecified (2016), Other conditions influencing health status (2018), Pyelonephritis of left kidney (2023), Type 2 diabetes mellitus with other specified complication (2022), Unilateral primary osteoarthritis, right hip (2019), and Vitamin D deficiency, unspecified.    Surgical History:  has a past surgical history that includes Other surgical history (2019).    Social History:  reports that she has never smoked. She has never used smokeless tobacco. She reports current alcohol use of about 1.0 standard drink of alcohol per week. She  reports that she does not use drugs.    Family History: family history includes Heart attack in her mother; Hypertension in her father; Stroke in her mother.     Allergies: Grass pollen    PHYSICAL EXAM   Oxygen Saturation Interpretation: Abnormal.    Physical Exam  Constitutional/General: Alert and oriented x1,  appears sick  HEENT:  NC/NT. PERRLA.  Airway patent.  Neck: Supple, full ROM. No midline vertebral tenderness or crepitus.   Respiratory: Lung sounds clear to auscultation bilaterally. No wheezes, rhonchi or stridor. Not in respiratory distress.  CV:  Regular rate. Regular rhythm. No murmurs or rubs. 2+ distal pulses.  GI:  Abdomen soft, non-tender, non-distended. +BS. No rebound, guarding, or rigidity. No pulsatile masses.  Musculoskeletal: Moves all extremities x 4. Warm and well perfused. Capillary refill <3 seconds  Integument: Skin warm and dry. No rashes.   Neurologic: Alert and oriented with no focal deficits, symmetric strength 5/5 in the upper and lower extremities bilaterally.  Psychiatric: Normal affect.    Lab / Imaging Results   (All laboratory and radiology results have been personally reviewed by myself)  Labs:  Results for orders placed or performed during the hospital encounter of 05/06/25   CBC and Auto Differential    Collection Time: 05/06/25  3:49 PM   Result Value Ref Range    WBC 11.6 (H) 4.4 - 11.3 x10*3/uL    nRBC 0.0 0.0 - 0.0 /100 WBCs    RBC 3.74 (L) 4.00 - 5.20 x10*6/uL    Hemoglobin 11.4 (L) 12.0 - 16.0 g/dL    Hematocrit 32.6 (L) 36.0 - 46.0 %    MCV 87 80 - 100 fL    MCH 30.5 26.0 - 34.0 pg    MCHC 35.0 32.0 - 36.0 g/dL    RDW 13.7 11.5 - 14.5 %    Platelets 77 (L) 150 - 450 x10*3/uL    Neutrophils % 87.2 40.0 - 80.0 %    Immature Granulocytes %, Automated 0.3 0.0 - 0.9 %    Lymphocytes % 5.9 13.0 - 44.0 %    Monocytes % 5.6 2.0 - 10.0 %    Eosinophils % 0.8 0.0 - 6.0 %    Basophils % 0.2 0.0 - 2.0 %    Neutrophils Absolute 10.13 (H) 1.60 - 5.50 x10*3/uL    Immature  Granulocytes Absolute, Automated 0.04 0.00 - 0.50 x10*3/uL    Lymphocytes Absolute 0.68 (L) 0.80 - 3.00 x10*3/uL    Monocytes Absolute 0.65 0.05 - 0.80 x10*3/uL    Eosinophils Absolute 0.09 0.00 - 0.40 x10*3/uL    Basophils Absolute 0.02 0.00 - 0.10 x10*3/uL   Magnesium    Collection Time: 05/06/25  3:49 PM   Result Value Ref Range    Magnesium 1.27 (L) 1.60 - 2.40 mg/dL   Comprehensive metabolic panel    Collection Time: 05/06/25  3:49 PM   Result Value Ref Range    Glucose 309 (H) 74 - 99 mg/dL    Sodium 133 (L) 136 - 145 mmol/L    Potassium 2.8 (LL) 3.5 - 5.3 mmol/L    Chloride 93 (L) 98 - 107 mmol/L    Bicarbonate 26 21 - 32 mmol/L    Anion Gap 17 10 - 20 mmol/L    Urea Nitrogen 22 6 - 23 mg/dL    Creatinine 1.17 (H) 0.50 - 1.05 mg/dL    eGFR 48 (L) >60 mL/min/1.73m*2    Calcium 8.9 8.6 - 10.3 mg/dL    Albumin 3.8 3.4 - 5.0 g/dL    Alkaline Phosphatase 93 33 - 136 U/L    Total Protein 7.8 6.4 - 8.2 g/dL    AST 22 9 - 39 U/L    Bilirubin, Total 1.3 (H) 0.0 - 1.2 mg/dL    ALT 28 7 - 45 U/L   Lipase    Collection Time: 05/06/25  3:49 PM   Result Value Ref Range    Lipase 20 9 - 82 U/L   Lactate    Collection Time: 05/06/25  3:49 PM   Result Value Ref Range    Lactate 3.3 (H) 0.4 - 2.0 mmol/L   Protime-INR    Collection Time: 05/06/25  3:49 PM   Result Value Ref Range    Protime 16.4 (H) 9.8 - 12.4 seconds    INR 1.5 (H) 0.9 - 1.1   aPTT    Collection Time: 05/06/25  3:49 PM   Result Value Ref Range    aPTT 32 26 - 36 seconds   Troponin I, High Sensitivity, Initial    Collection Time: 05/06/25  3:49 PM   Result Value Ref Range    Troponin I, High Sensitivity 8 0 - 13 ng/L   Blood Gas Venous Full Panel    Collection Time: 05/06/25  3:49 PM   Result Value Ref Range    POCT pH, Venous 7.52 (H) 7.33 - 7.43 pH    POCT pCO2, Venous 33 (L) 41 - 51 mm Hg    POCT pO2, Venous 58 (H) 35 - 45 mm Hg    POCT SO2, Venous 92 (H) 45 - 75 %    POCT Oxy Hemoglobin, Venous 89.3 (H) 45.0 - 75.0 %    POCT Hematocrit Calculated, Venous  36.0 36.0 - 46.0 %    POCT Sodium, Venous 132 (L) 136 - 145 mmol/L    POCT Potassium, Venous 2.8 (LL) 3.5 - 5.3 mmol/L    POCT Chloride, Venous 96 (L) 98 - 107 mmol/L    POCT Ionized Calicum, Venous 1.15 1.10 - 1.33 mmol/L    POCT Glucose, Venous 337 (H) 74 - 99 mg/dL    POCT Lactate, Venous 3.2 (H) 0.4 - 2.0 mmol/L    POCT Base Excess, Venous 4.2 (H) -2.0 - 3.0 mmol/L    POCT HCO3 Calculated, Venous 26.9 (H) 22.0 - 26.0 mmol/L    POCT Hemoglobin, Venous 11.9 (L) 12.0 - 16.0 g/dL    POCT Anion Gap, Venous 12.0 10.0 - 25.0 mmol/L    Patient Temperature      FiO2 21 %   Bilirubin, Direct    Collection Time: 05/06/25  3:49 PM   Result Value Ref Range    Bilirubin, Direct 0.3 0.0 - 0.3 mg/dL   Morphology    Collection Time: 05/06/25  3:49 PM   Result Value Ref Range    RBC Morphology See Below     Polychromasia Mild    Pathologist Review-CBC Differential    Collection Time: 05/06/25  3:49 PM   Result Value Ref Range    Pathologist Review-CBC Differential       Mature neutrophilia, mild anemia (normocytic), and mild to moderate anemia, Schistocytes not increased. Blasts not seen. Patient admitted with thrombosis, among other problems. Clinical correlation is advised.   Blood Culture    Collection Time: 05/06/25  3:50 PM    Specimen: Peripheral Venipuncture; Blood culture   Result Value Ref Range    Blood Culture No growth at 2 days    Blood Culture    Collection Time: 05/06/25  3:50 PM    Specimen: Peripheral Venipuncture; Blood culture   Result Value Ref Range    Blood Culture No growth at 2 days    Sars-CoV-2, Influenza A/B and RSV PCR    Collection Time: 05/06/25  3:51 PM   Result Value Ref Range    Coronavirus 2019, PCR Not Detected Not Detected    Flu A Result Not Detected Not Detected    Flu B Result Not Detected Not Detected    RSV PCR Not Detected Not Detected   Urinalysis with Reflex Culture and Microscopic    Collection Time: 05/06/25  4:01 PM   Result Value Ref Range    Color, Urine Yellow Light-Yellow,  Yellow, Dark-Yellow    Appearance, Urine Clear Clear    Specific Gravity, Urine 1.023 1.005 - 1.035    pH, Urine 5.5 5.0, 5.5, 6.0, 6.5, 7.0, 7.5, 8.0    Protein, Urine 30 (1+) (A) NEGATIVE, 10 (TRACE), 20 (TRACE) mg/dL    Glucose, Urine 1000 (4+) (A) Normal mg/dL    Blood, Urine NEGATIVE NEGATIVE mg/dL    Ketones, Urine NEGATIVE NEGATIVE mg/dL    Bilirubin, Urine NEGATIVE NEGATIVE mg/dL    Urobilinogen, Urine Normal Normal mg/dL    Nitrite, Urine NEGATIVE NEGATIVE    Leukocyte Esterase, Urine NEGATIVE NEGATIVE   Extra Urine Gray Tube    Collection Time: 05/06/25  4:01 PM   Result Value Ref Range    Extra Tube 293    Urinalysis Microscopic    Collection Time: 05/06/25  4:01 PM   Result Value Ref Range    WBC, Urine NONE 1-5, NONE /HPF    RBC, Urine 1-2 NONE, 1-2, 3-5 /HPF   Troponin, High Sensitivity, 1 Hour    Collection Time: 05/06/25  5:16 PM   Result Value Ref Range    Troponin I, High Sensitivity 8 0 - 13 ng/L   Lactate    Collection Time: 05/06/25  5:16 PM   Result Value Ref Range    Lactate 2.0 0.4 - 2.0 mmol/L   aPTT - baseline    Collection Time: 05/06/25  6:45 PM   Result Value Ref Range    aPTT 30 26 - 36 seconds   Protime-INR    Collection Time: 05/06/25  6:45 PM   Result Value Ref Range    Protime 16.7 (H) 9.8 - 12.4 seconds    INR 1.5 (H) 0.9 - 1.1   MRSA Surveillance for Vancomycin De-escalation, PCR    Collection Time: 05/06/25  6:58 PM    Specimen: Anterior Nares; Swab   Result Value Ref Range    MRSA PCR Not Detected Not Detected   Heparin Assay, UFH    Collection Time: 05/06/25 11:00 PM   Result Value Ref Range    Heparin Unfractionated 0.9 See Comment Below for Therapeutic Ranges IU/mL   CBC and Auto Differential    Collection Time: 05/07/25  3:25 AM   Result Value Ref Range    WBC 9.1 4.4 - 11.3 x10*3/uL    nRBC 0.0 0.0 - 0.0 /100 WBCs    RBC 3.59 (L) 4.00 - 5.20 x10*6/uL    Hemoglobin 10.8 (L) 12.0 - 16.0 g/dL    Hematocrit 31.5 (L) 36.0 - 46.0 %    MCV 88 80 - 100 fL    MCH 30.1 26.0 - 34.0 pg     MCHC 34.3 32.0 - 36.0 g/dL    RDW 13.7 11.5 - 14.5 %    Platelets 84 (L) 150 - 450 x10*3/uL    Neutrophils % 68.8 40.0 - 80.0 %    Immature Granulocytes %, Automated 0.3 0.0 - 0.9 %    Lymphocytes % 21.9 13.0 - 44.0 %    Monocytes % 7.7 2.0 - 10.0 %    Eosinophils % 1.1 0.0 - 6.0 %    Basophils % 0.2 0.0 - 2.0 %    Neutrophils Absolute 6.27 (H) 1.60 - 5.50 x10*3/uL    Immature Granulocytes Absolute, Automated 0.03 0.00 - 0.50 x10*3/uL    Lymphocytes Absolute 2.00 0.80 - 3.00 x10*3/uL    Monocytes Absolute 0.70 0.05 - 0.80 x10*3/uL    Eosinophils Absolute 0.10 0.00 - 0.40 x10*3/uL    Basophils Absolute 0.02 0.00 - 0.10 x10*3/uL   Comprehensive metabolic panel    Collection Time: 05/07/25  3:25 AM   Result Value Ref Range    Glucose 194 (H) 74 - 99 mg/dL    Sodium 134 (L) 136 - 145 mmol/L    Potassium 2.9 (LL) 3.5 - 5.3 mmol/L    Chloride 96 (L) 98 - 107 mmol/L    Bicarbonate 26 21 - 32 mmol/L    Anion Gap 15 10 - 20 mmol/L    Urea Nitrogen 16 6 - 23 mg/dL    Creatinine 0.81 0.50 - 1.05 mg/dL    eGFR 74 >60 mL/min/1.73m*2    Calcium 8.6 8.6 - 10.3 mg/dL    Albumin 3.3 (L) 3.4 - 5.0 g/dL    Alkaline Phosphatase 86 33 - 136 U/L    Total Protein 6.8 6.4 - 8.2 g/dL    AST 25 9 - 39 U/L    Bilirubin, Total 1.1 0.0 - 1.2 mg/dL    ALT 26 7 - 45 U/L   Magnesium    Collection Time: 05/07/25  3:25 AM   Result Value Ref Range    Magnesium 1.73 1.60 - 2.40 mg/dL   Phosphorus    Collection Time: 05/07/25  3:25 AM   Result Value Ref Range    Phosphorus 1.8 (L) 2.5 - 4.9 mg/dL   Protime-INR    Collection Time: 05/07/25  3:25 AM   Result Value Ref Range    Protime 16.3 (H) 9.8 - 12.4 seconds    INR 1.5 (H) 0.9 - 1.1   Heparin Assay, UFH    Collection Time: 05/07/25  3:25 AM   Result Value Ref Range    Heparin Unfractionated 0.6 See Comment Below for Therapeutic Ranges IU/mL   Heparin Assay    Collection Time: 05/07/25  8:12 AM   Result Value Ref Range    Heparin Unfractionated 0.4 See Comment Below for Therapeutic Ranges IU/mL    POCT GLUCOSE    Collection Time: 05/07/25  8:35 AM   Result Value Ref Range    POCT Glucose 188 (H) 74 - 99 mg/dL   POCT GLUCOSE    Collection Time: 05/07/25 11:57 AM   Result Value Ref Range    POCT Glucose 234 (H) 74 - 99 mg/dL   POCT GLUCOSE    Collection Time: 05/07/25  3:55 PM   Result Value Ref Range    POCT Glucose 136 (H) 74 - 99 mg/dL   POCT GLUCOSE    Collection Time: 05/07/25  7:20 PM   Result Value Ref Range    POCT Glucose 239 (H) 74 - 99 mg/dL   CBC and Auto Differential    Collection Time: 05/08/25  6:10 AM   Result Value Ref Range    WBC 7.2 4.4 - 11.3 x10*3/uL    nRBC 0.0 0.0 - 0.0 /100 WBCs    RBC 3.63 (L) 4.00 - 5.20 x10*6/uL    Hemoglobin 10.9 (L) 12.0 - 16.0 g/dL    Hematocrit 32.6 (L) 36.0 - 46.0 %    MCV 90 80 - 100 fL    MCH 30.0 26.0 - 34.0 pg    MCHC 33.4 32.0 - 36.0 g/dL    RDW 13.7 11.5 - 14.5 %    Platelets 102 (L) 150 - 450 x10*3/uL    Neutrophils % 68.7 40.0 - 80.0 %    Immature Granulocytes %, Automated 0.4 0.0 - 0.9 %    Lymphocytes % 22.9 13.0 - 44.0 %    Monocytes % 5.8 2.0 - 10.0 %    Eosinophils % 1.9 0.0 - 6.0 %    Basophils % 0.3 0.0 - 2.0 %    Neutrophils Absolute 4.94 1.60 - 5.50 x10*3/uL    Immature Granulocytes Absolute, Automated 0.03 0.00 - 0.50 x10*3/uL    Lymphocytes Absolute 1.65 0.80 - 3.00 x10*3/uL    Monocytes Absolute 0.42 0.05 - 0.80 x10*3/uL    Eosinophils Absolute 0.14 0.00 - 0.40 x10*3/uL    Basophils Absolute 0.02 0.00 - 0.10 x10*3/uL   Comprehensive metabolic panel    Collection Time: 05/08/25  6:10 AM   Result Value Ref Range    Glucose 192 (H) 74 - 99 mg/dL    Sodium 134 (L) 136 - 145 mmol/L    Potassium 2.9 (LL) 3.5 - 5.3 mmol/L    Chloride 95 (L) 98 - 107 mmol/L    Bicarbonate 27 21 - 32 mmol/L    Anion Gap 15 10 - 20 mmol/L    Urea Nitrogen 8 6 - 23 mg/dL    Creatinine 0.68 0.50 - 1.05 mg/dL    eGFR 89 >60 mL/min/1.73m*2    Calcium 8.4 (L) 8.6 - 10.3 mg/dL    Albumin 3.1 (L) 3.4 - 5.0 g/dL    Alkaline Phosphatase 83 33 - 136 U/L    Total Protein 6.5  6.4 - 8.2 g/dL    AST 31 9 - 39 U/L    Bilirubin, Total 0.7 0.0 - 1.2 mg/dL    ALT 33 7 - 45 U/L   Magnesium    Collection Time: 05/08/25  6:10 AM   Result Value Ref Range    Magnesium 1.54 (L) 1.60 - 2.40 mg/dL   Phosphorus    Collection Time: 05/08/25  6:10 AM   Result Value Ref Range    Phosphorus 3.3 2.5 - 4.9 mg/dL   Protime-INR    Collection Time: 05/08/25  6:10 AM   Result Value Ref Range    Protime 14.9 (H) 9.8 - 12.4 seconds    INR 1.3 (H) 0.9 - 1.1   Heparin Assay    Collection Time: 05/08/25  6:10 AM   Result Value Ref Range    Heparin Unfractionated 0.3 See Comment Below for Therapeutic Ranges IU/mL   POCT GLUCOSE    Collection Time: 05/08/25  7:33 AM   Result Value Ref Range    POCT Glucose 184 (H) 74 - 99 mg/dL   POCT GLUCOSE    Collection Time: 05/08/25 11:24 AM   Result Value Ref Range    POCT Glucose 284 (H) 74 - 99 mg/dL   POCT GLUCOSE    Collection Time: 05/08/25  4:08 PM   Result Value Ref Range    POCT Glucose 182 (H) 74 - 99 mg/dL   CBC and Auto Differential    Collection Time: 05/09/25  7:14 AM   Result Value Ref Range    WBC 7.0 4.4 - 11.3 x10*3/uL    nRBC 0.0 0.0 - 0.0 /100 WBCs    RBC 3.69 (L) 4.00 - 5.20 x10*6/uL    Hemoglobin 11.0 (L) 12.0 - 16.0 g/dL    Hematocrit 30.9 (L) 36.0 - 46.0 %    MCV 84 80 - 100 fL    MCH 29.8 26.0 - 34.0 pg    MCHC 35.6 32.0 - 36.0 g/dL    RDW 13.7 11.5 - 14.5 %    Platelets 106 (L) 150 - 450 x10*3/uL    Neutrophils % 68.1 40.0 - 80.0 %    Immature Granulocytes %, Automated 0.4 0.0 - 0.9 %    Lymphocytes % 22.9 13.0 - 44.0 %    Monocytes % 5.3 2.0 - 10.0 %    Eosinophils % 3.0 0.0 - 6.0 %    Basophils % 0.3 0.0 - 2.0 %    Neutrophils Absolute 4.75 1.60 - 5.50 x10*3/uL    Immature Granulocytes Absolute, Automated 0.03 0.00 - 0.50 x10*3/uL    Lymphocytes Absolute 1.60 0.80 - 3.00 x10*3/uL    Monocytes Absolute 0.37 0.05 - 0.80 x10*3/uL    Eosinophils Absolute 0.21 0.00 - 0.40 x10*3/uL    Basophils Absolute 0.02 0.00 - 0.10 x10*3/uL   Comprehensive metabolic  panel    Collection Time: 05/09/25  7:14 AM   Result Value Ref Range    Glucose 188 (H) 74 - 99 mg/dL    Sodium 134 (L) 136 - 145 mmol/L    Potassium 4.1 3.5 - 5.3 mmol/L    Chloride 97 (L) 98 - 107 mmol/L    Bicarbonate 26 21 - 32 mmol/L    Anion Gap 15 10 - 20 mmol/L    Urea Nitrogen 9 6 - 23 mg/dL    Creatinine 0.61 0.50 - 1.05 mg/dL    eGFR >90 >60 mL/min/1.73m*2    Calcium 8.8 8.6 - 10.3 mg/dL    Albumin 3.2 (L) 3.4 - 5.0 g/dL    Alkaline Phosphatase 91 33 - 136 U/L    Total Protein 6.6 6.4 - 8.2 g/dL    AST 24 9 - 39 U/L    Bilirubin, Total 0.6 0.0 - 1.2 mg/dL    ALT 33 7 - 45 U/L   Magnesium    Collection Time: 05/09/25  7:14 AM   Result Value Ref Range    Magnesium 1.61 1.60 - 2.40 mg/dL   Phosphorus    Collection Time: 05/09/25  7:14 AM   Result Value Ref Range    Phosphorus 3.2 2.5 - 4.9 mg/dL   Protime-INR    Collection Time: 05/09/25  7:14 AM   Result Value Ref Range    Protime 14.1 (H) 9.8 - 12.4 seconds    INR 1.3 (H) 0.9 - 1.1   Heparin Assay    Collection Time: 05/09/25  7:14 AM   Result Value Ref Range    Heparin Unfractionated 0.1 See Comment Below for Therapeutic Ranges IU/mL   POCT GLUCOSE    Collection Time: 05/09/25  7:27 AM   Result Value Ref Range    POCT Glucose 182 (H) 74 - 99 mg/dL   POCT GLUCOSE    Collection Time: 05/09/25 10:52 AM   Result Value Ref Range    POCT Glucose 236 (H) 74 - 99 mg/dL     Imaging:  All Radiology results interpreted by Radiologist unless otherwise noted.  Vascular US Lower Extremity Venous Duplex Bilateral   Final Result   No sonographic evidence for deep vein thrombosis within the evaluated   veins of the bilateral lower extremity.   23 mm left popliteal fossa fluid collection.        MACRO:   None        Signed by: Yemi Zambrano 5/6/2025 8:01 PM   Dictation workstation:   [id number]      CT head wo IV contrast   Final Result   No acute intracranial abnormality.             MACRO:   None.        Signed by: Marek Trivedi 5/6/2025 6:10 PM   Dictation  workstation:   [id number]      CT chest abdomen pelvis w IV contrast   Final Result   large partially occlusive thrombus within the IVC measuring   approximately 11 cm in length extending from the iliocaval   bifurcation and extending just above the renal veins. At most this   result in 85% stenosis in the infrarenal cava.        No other acute process in the chest, abdomen, or pelvis.        Chronic compression fractures of L2 and L3 with 25% height loss, not   significantly changed.        Chronic fracture of the inferior angle of the right scapula.        MACRO:   Marek Trivedi discussed the significance and urgency of this   critical finding via [employer] with confirmation of receipt with   BRIANNA RUIZ on 5/6/2025 at 6:21 pm.  (**-RCF-**) Findings:  See   findings.        Signed by: Marek Trivedi 5/6/2025 6:22 PM   Dictation workstation:   [id number]      XR chest 2 views   Final Result   No acute cardiopulmonary process is evident.        MACRO:   None        Signed by: Art Sullivan 5/6/2025 4:29 PM   Dictation workstation:   [id number]          ED Course / Medical Decision Making     Medications   acetaminophen (Tylenol) tablet 650 mg (has no administration in time range)     Or   acetaminophen (Tylenol) oral liquid 650 mg (has no administration in time range)     Or   acetaminophen (Tylenol) suppository 650 mg (has no administration in time range)   atenolol (Tenormin) tablet 50 mg (50 mg oral Given 5/9/25 0915)   atorvastatin (Lipitor) tablet 40 mg (40 mg oral Given 5/9/25 0932)   donepezil (Aricept) tablet 10 mg (10 mg oral Given 5/8/25 2105)   glipiZIDE (Glucotrol) tablet 2.5 mg ( oral Dose Auto Held 5/15/25 0900)   melatonin tablet 3 mg (3 mg oral Not Given 5/7/25 2100)   mirtazapine (Remeron) tablet 15 mg (15 mg oral Given 5/8/25 2105)   bisacodyl (Dulcolax) EC tablet 10 mg (has no administration in time range)   docusate sodium (Colace) capsule 100 mg (100 mg oral Given 5/9/25 0915)    enoxaparin (Lovenox) syringe 80 mg (80 mg subcutaneous Given 5/8/25 1334)   warfarin (Coumadin) tablet 3 mg (3 mg oral Given 5/8/25 1830)   insulin lispro injection 0-15 Units (3 Units subcutaneous Given 5/9/25 0915)   potassium chloride CR (Klor-Con M20) ER tablet 20 mEq (20 mEq oral Given 5/9/25 0915)   sodium chloride 0.9 % bolus 1,000 mL (0 mL intravenous Stopped 5/6/25 1713)   acetaminophen (Tylenol) tablet 650 mg (650 mg oral Given 5/6/25 1604)   piperacillin-tazobactam (Zosyn) 3.375 g in dextrose (iso) IV 50 mL (0 g intravenous Stopped 5/6/25 1713)   potassium chloride (Klor-Con) packet 20 mEq (20 mEq oral Given 5/6/25 1707)   potassium chloride 20 mEq in sterile water for injection 100 mL (0 mEq intravenous Stopped 5/6/25 2244)   magnesium sulfate 2 g in sterile water for injection 50 mL (0 g intravenous Stopped 5/6/25 1857)   iohexol (OMNIPaque) 350 mg iodine/mL solution 75 mL (75 mL intravenous Given 5/6/25 1741)   vancomycin (Vancocin) 1,000 mg in dextrose 5%  mL (0 mg intravenous Stopped 5/6/25 2002)   heparin bolus from bag 4,500 Units (4,500 Units intravenous Bolus from Bag 5/6/25 1856)   potassium chloride 20 mEq in sterile water for injection 100 mL (0 mEq intravenous Stopped 5/7/25 0853)   potassium phosphates 21 mmol in dextrose 5% 250 mL IV (0 mmol intravenous Stopped 5/7/25 1616)   potassium chloride 20 mEq in sterile water for injection 100 mL (0 mEq intravenous Stopped 5/8/25 1448)   potassium chloride (Klor-Con) packet 40 mEq (40 mEq oral Given 5/8/25 0918)   magnesium sulfate 2 g in sterile water for injection 50 mL (0 g intravenous Stopped 5/8/25 1121)     ED Course as of 05/09/25 1105   Tue May 06, 2025   1538 EKG rate: 92 bpm, pr interval 144 ms qrs duration 82 ms qt qtc 380/469 ms, prt axes 19 51 25, nrs personally reviewed by me, normal axis, rhythm [PK]      ED Course User Index  [PK] Aditi Travis, APRN-CNP         Diagnoses as of 05/09/25 1105   Metabolic encephalopathy    SIRS (systemic inflammatory response syndrome) (Multi)   Fever and chills     Re-examination:    Patient’s condition fair .    Consult(s):   Cardiology Dr Wang        MDM:       Inge Clark is a 80 y.o. female who presents to the ED for evaluation of metabolic encephalopathy.  PMH of NIDDM-type II, vascular dementia, HTN, HLD,  dysphagia that presented to the ED also for not able to ambulate well.    Both  and pt are fair historian.  Daughter stated that pt has been mumbling 6 days of no bm and shaking her arms. No longer wants to walk.    Was here a month ago for low mag, and placement. Just got out last week. Could not work with therapy few days ago. Denied trauma falls, blood thinner.      stated that she is near baseline but he has parkinson's and cannot help or do much.  She ate oatmeal okay this am and some jello.    She is DM but did not know what her glucoses have been running. Temperature 101.5 in ED.      ED course stable  Pan cultured with temperature, Zosyn and Vanco.   Potassium 2.8, repleted  Mag 1.2 repleted.  Pt with poor oral intake last 5 days.   Ct scan head at baseline  Ct scan abd pelvis chest showed + thrombus. Discussed with Dr Gloria pt ambulates very little has not pain, us to be completed which did show ? Baker type cyst but no pain. Pulses +2 intact.   Heparin drip started.  Admitted to hospitalist  Ddx: fever chills blood clot     Plan of Care/Counseling:  I reviewed today's visit with the patient  and son  in addition to providing specific details for the plan of care and counseling regarding the diagnosis and prognosis.  Questions are answered at this time and are agreeable with the plan.    ASSESSMENT     1. Metabolic encephalopathy    2. SIRS (systemic inflammatory response syndrome) (Multi)    3. Fever and chills    4. 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 Does this patient have sepsis or concern for sepsis (probable or documented infection plus systemic manifestations of infection)?:   Yes     Suspected Indication (Select all that apply):   Other     Specify:   sepsis/sirs     Type of Therapy:   Empiric    potassium chloride (Klor-Con) packet 20 mEq    potassium chloride 20 mEq in sterile water for injection 100 mL    magnesium sulfate 2 g in sterile water for injection 50 mL    iohexol (OMNIPaque) 350 mg iodine/mL solution 75 mL    vancomycin (Vancocin) 1,000 mg in dextrose 5%  mL     Dosing of this medication varies based on severity of illness. Does this patient have sepsis or concern for sepsis (probable or documented infection plus systemic manifestations of infection)?:   Yes     Suspected Indication (Select all that apply):   Pneumonia     Type of Therapy:   Empiric    heparin bolus from bag 4,500 Units    OR Linked Order Group     acetaminophen (Tylenol) tablet 650 mg      If ordered PRN for pain, nurse is permitted to administer this medication for higher pain scores based on patient preference?:   Yes     acetaminophen (Tylenol) oral liquid 650 mg     acetaminophen (Tylenol) suppository 650 mg      If ordered PRN for pain, nurse is permitted to administer this medication for higher pain scores based on patient preference?:   Yes    atenolol (Tenormin) tablet 50 mg    atorvastatin (Lipitor) tablet 40 mg    donepezil (Aricept) tablet 10 mg    glipiZIDE (Glucotrol) tablet 2.5 mg    melatonin tablet 3 mg    mirtazapine (Remeron) tablet 15 mg    potassium chloride 20 mEq in sterile water  for injection 100 mL    potassium phosphates 21 mmol in dextrose 5% 250 mL IV    bisacodyl (Dulcolax) EC tablet 10 mg    docusate sodium (Colace) capsule 100 mg    potassium chloride 20 mEq in sterile water for injection 100 mL    potassium chloride (Klor-Con) packet 40 mEq    magnesium sulfate 2 g in sterile water for injection 50 mL    enoxaparin (Lovenox) syringe 80 mg    warfarin (Coumadin) tablet 3 mg     Ensure proper admin timing per warfarin policy.     Indication:   Venous thromboembolism     Target INR::   2 to 3     History::   New start    insulin lispro injection 0-15 Units    potassium chloride CR (Klor-Con M20) ER tablet 20 mEq     Electronically signed by PRAVIN Barraza-CNP     **This report was transcribed using voice recognition software. Every effort was made to ensure accuracy; however, inadvertent computerized transcription errors may be present.  END OF ED PROVIDER NOTE     NISHI Barraza  05/09/25 3672

## 2025-05-07 DIAGNOSIS — I82.221 CHRONIC EMBOLISM AND THROMBOSIS OF INFERIOR VENA CAVA (MULTI): Primary | ICD-10-CM

## 2025-05-07 LAB
ALBUMIN SERPL BCP-MCNC: 3.3 G/DL (ref 3.4–5)
ALP SERPL-CCNC: 86 U/L (ref 33–136)
ALT SERPL W P-5'-P-CCNC: 26 U/L (ref 7–45)
ANION GAP SERPL CALC-SCNC: 15 MMOL/L (ref 10–20)
AST SERPL W P-5'-P-CCNC: 25 U/L (ref 9–39)
BASOPHILS # BLD AUTO: 0.02 X10*3/UL (ref 0–0.1)
BASOPHILS NFR BLD AUTO: 0.2 %
BILIRUB SERPL-MCNC: 1.1 MG/DL (ref 0–1.2)
BUN SERPL-MCNC: 16 MG/DL (ref 6–23)
CALCIUM SERPL-MCNC: 8.6 MG/DL (ref 8.6–10.3)
CHLORIDE SERPL-SCNC: 96 MMOL/L (ref 98–107)
CO2 SERPL-SCNC: 26 MMOL/L (ref 21–32)
CREAT SERPL-MCNC: 0.81 MG/DL (ref 0.5–1.05)
EGFRCR SERPLBLD CKD-EPI 2021: 74 ML/MIN/1.73M*2
EOSINOPHIL # BLD AUTO: 0.1 X10*3/UL (ref 0–0.4)
EOSINOPHIL NFR BLD AUTO: 1.1 %
ERYTHROCYTE [DISTWIDTH] IN BLOOD BY AUTOMATED COUNT: 13.7 % (ref 11.5–14.5)
GLUCOSE BLD MANUAL STRIP-MCNC: 136 MG/DL (ref 74–99)
GLUCOSE BLD MANUAL STRIP-MCNC: 188 MG/DL (ref 74–99)
GLUCOSE BLD MANUAL STRIP-MCNC: 234 MG/DL (ref 74–99)
GLUCOSE BLD MANUAL STRIP-MCNC: 239 MG/DL (ref 74–99)
GLUCOSE SERPL-MCNC: 194 MG/DL (ref 74–99)
HCT VFR BLD AUTO: 31.5 % (ref 36–46)
HGB BLD-MCNC: 10.8 G/DL (ref 12–16)
IMM GRANULOCYTES # BLD AUTO: 0.03 X10*3/UL (ref 0–0.5)
IMM GRANULOCYTES NFR BLD AUTO: 0.3 % (ref 0–0.9)
INR PPP: 1.5 (ref 0.9–1.1)
LYMPHOCYTES # BLD AUTO: 2 X10*3/UL (ref 0.8–3)
LYMPHOCYTES NFR BLD AUTO: 21.9 %
MAGNESIUM SERPL-MCNC: 1.73 MG/DL (ref 1.6–2.4)
MCH RBC QN AUTO: 30.1 PG (ref 26–34)
MCHC RBC AUTO-ENTMCNC: 34.3 G/DL (ref 32–36)
MCV RBC AUTO: 88 FL (ref 80–100)
MONOCYTES # BLD AUTO: 0.7 X10*3/UL (ref 0.05–0.8)
MONOCYTES NFR BLD AUTO: 7.7 %
NEUTROPHILS # BLD AUTO: 6.27 X10*3/UL (ref 1.6–5.5)
NEUTROPHILS NFR BLD AUTO: 68.8 %
NRBC BLD-RTO: 0 /100 WBCS (ref 0–0)
PATH REVIEW-CBC DIFFERENTIAL: NORMAL
PHOSPHATE SERPL-MCNC: 1.8 MG/DL (ref 2.5–4.9)
PLATELET # BLD AUTO: 84 X10*3/UL (ref 150–450)
POTASSIUM SERPL-SCNC: 2.9 MMOL/L (ref 3.5–5.3)
PROT SERPL-MCNC: 6.8 G/DL (ref 6.4–8.2)
PROTHROMBIN TIME: 16.3 SECONDS (ref 9.8–12.4)
RBC # BLD AUTO: 3.59 X10*6/UL (ref 4–5.2)
SODIUM SERPL-SCNC: 134 MMOL/L (ref 136–145)
UFH PPP CHRO-ACNC: 0.4 IU/ML (ref ?–1.1)
UFH PPP CHRO-ACNC: 0.6 IU/ML (ref ?–1.1)
WBC # BLD AUTO: 9.1 X10*3/UL (ref 4.4–11.3)

## 2025-05-07 PROCEDURE — 36415 COLL VENOUS BLD VENIPUNCTURE: CPT

## 2025-05-07 PROCEDURE — 2500000004 HC RX 250 GENERAL PHARMACY W/ HCPCS (ALT 636 FOR OP/ED): Mod: JZ

## 2025-05-07 PROCEDURE — 2500000002 HC RX 250 W HCPCS SELF ADMINISTERED DRUGS (ALT 637 FOR MEDICARE OP, ALT 636 FOR OP/ED)

## 2025-05-07 PROCEDURE — 84100 ASSAY OF PHOSPHORUS: CPT

## 2025-05-07 PROCEDURE — 1200000002 HC GENERAL ROOM WITH TELEMETRY DAILY

## 2025-05-07 PROCEDURE — 85610 PROTHROMBIN TIME: CPT

## 2025-05-07 PROCEDURE — 83735 ASSAY OF MAGNESIUM: CPT

## 2025-05-07 PROCEDURE — 94760 N-INVAS EAR/PLS OXIMETRY 1: CPT

## 2025-05-07 PROCEDURE — 99222 1ST HOSP IP/OBS MODERATE 55: CPT | Performed by: PHYSICIAN ASSISTANT

## 2025-05-07 PROCEDURE — 80053 COMPREHEN METABOLIC PANEL: CPT

## 2025-05-07 PROCEDURE — 85520 HEPARIN ASSAY: CPT

## 2025-05-07 PROCEDURE — 92610 EVALUATE SWALLOWING FUNCTION: CPT | Mod: GN

## 2025-05-07 PROCEDURE — 97165 OT EVAL LOW COMPLEX 30 MIN: CPT | Mod: GO

## 2025-05-07 PROCEDURE — 82947 ASSAY GLUCOSE BLOOD QUANT: CPT

## 2025-05-07 PROCEDURE — 85025 COMPLETE CBC W/AUTO DIFF WBC: CPT

## 2025-05-07 PROCEDURE — 2500000004 HC RX 250 GENERAL PHARMACY W/ HCPCS (ALT 636 FOR OP/ED): Mod: JZ | Performed by: NURSE PRACTITIONER

## 2025-05-07 PROCEDURE — 97161 PT EVAL LOW COMPLEX 20 MIN: CPT | Mod: GP

## 2025-05-07 PROCEDURE — 2500000005 HC RX 250 GENERAL PHARMACY W/O HCPCS

## 2025-05-07 PROCEDURE — 85520 HEPARIN ASSAY: CPT | Performed by: NURSE PRACTITIONER

## 2025-05-07 PROCEDURE — 2500000001 HC RX 250 WO HCPCS SELF ADMINISTERED DRUGS (ALT 637 FOR MEDICARE OP)

## 2025-05-07 RX ORDER — DOCUSATE SODIUM 100 MG/1
100 CAPSULE, LIQUID FILLED ORAL DAILY
Status: DISCONTINUED | OUTPATIENT
Start: 2025-05-08 | End: 2025-05-12 | Stop reason: HOSPADM

## 2025-05-07 RX ORDER — POTASSIUM CHLORIDE 14.9 MG/ML
20 INJECTION INTRAVENOUS
Status: COMPLETED | OUTPATIENT
Start: 2025-05-07 | End: 2025-05-07

## 2025-05-07 RX ORDER — BISACODYL 5 MG
10 TABLET, DELAYED RELEASE (ENTERIC COATED) ORAL DAILY PRN
Status: DISCONTINUED | OUTPATIENT
Start: 2025-05-07 | End: 2025-05-12 | Stop reason: HOSPADM

## 2025-05-07 RX ADMIN — INSULIN LISPRO 2 UNITS: 100 INJECTION, SOLUTION INTRAVENOUS; SUBCUTANEOUS at 09:06

## 2025-05-07 RX ADMIN — ATENOLOL 50 MG: 50 TABLET ORAL at 09:06

## 2025-05-07 RX ADMIN — BISACODYL 10 MG: 5 TABLET, COATED ORAL at 09:12

## 2025-05-07 RX ADMIN — INSULIN LISPRO 4 UNITS: 100 INJECTION, SOLUTION INTRAVENOUS; SUBCUTANEOUS at 13:00

## 2025-05-07 RX ADMIN — POTASSIUM CHLORIDE 20 MEQ: 14.9 INJECTION, SOLUTION INTRAVENOUS at 06:31

## 2025-05-07 RX ADMIN — HEPARIN SODIUM 900 UNITS/HR: 10000 INJECTION, SOLUTION INTRAVENOUS at 18:16

## 2025-05-07 RX ADMIN — POTASSIUM PHOSPHATE, MONOBASIC AND POTASSIUM PHOSPHATE, DIBASIC 21 MMOL: 224; 236 INJECTION, SOLUTION, CONCENTRATE INTRAVENOUS at 09:06

## 2025-05-07 RX ADMIN — DONEPEZIL HYDROCHLORIDE 10 MG: 5 TABLET ORAL at 21:00

## 2025-05-07 RX ADMIN — POTASSIUM CHLORIDE 20 MEQ: 14.9 INJECTION, SOLUTION INTRAVENOUS at 04:27

## 2025-05-07 RX ADMIN — MIRTAZAPINE 15 MG: 15 TABLET, FILM COATED ORAL at 21:00

## 2025-05-07 RX ADMIN — ATORVASTATIN CALCIUM 40 MG: 40 TABLET, FILM COATED ORAL at 09:06

## 2025-05-07 RX ADMIN — INSULIN LISPRO 4 UNITS: 100 INJECTION, SOLUTION INTRAVENOUS; SUBCUTANEOUS at 21:01

## 2025-05-07 RX ADMIN — DOCUSATE SODIUM 100 MG: 100 CAPSULE, LIQUID FILLED ORAL at 09:06

## 2025-05-07 ASSESSMENT — ACTIVITIES OF DAILY LIVING (ADL)
BATHING_ASSISTANCE: MAXIMAL
ADL_ASSISTANCE: NEEDS ASSISTANCE
ADLS_ADDRESSED: YES
ADL_ASSISTANCE: NEEDS ASSISTANCE

## 2025-05-07 ASSESSMENT — PAIN SCALES - GENERAL
PAINLEVEL_OUTOF10: 0 - NO PAIN

## 2025-05-07 ASSESSMENT — COGNITIVE AND FUNCTIONAL STATUS - GENERAL
MOVING TO AND FROM BED TO CHAIR: A LITTLE
EATING MEALS: A LITTLE
PERSONAL GROOMING: A LITTLE
CLIMB 3 TO 5 STEPS WITH RAILING: A LITTLE
WALKING IN HOSPITAL ROOM: A LITTLE
DRESSING REGULAR UPPER BODY CLOTHING: A LITTLE
DRESSING REGULAR LOWER BODY CLOTHING: A LOT
WALKING IN HOSPITAL ROOM: A LITTLE
MOBILITY SCORE: 18
MOBILITY SCORE: 16
STANDING UP FROM CHAIR USING ARMS: A LITTLE
DRESSING REGULAR LOWER BODY CLOTHING: A LITTLE
DAILY ACTIVITIY SCORE: 14
MOVING TO AND FROM BED TO CHAIR: A LITTLE
DRESSING REGULAR UPPER BODY CLOTHING: A LOT
HELP NEEDED FOR BATHING: A LOT
TOILETING: A LOT
TURNING FROM BACK TO SIDE WHILE IN FLAT BAD: A LOT
MOVING FROM LYING ON BACK TO SITTING ON SIDE OF FLAT BED WITH BEDRAILS: A LITTLE
PERSONAL GROOMING: A LITTLE
TURNING FROM BACK TO SIDE WHILE IN FLAT BAD: A LITTLE
CLIMB 3 TO 5 STEPS WITH RAILING: A LITTLE
STANDING UP FROM CHAIR USING ARMS: A LITTLE
MOVING FROM LYING ON BACK TO SITTING ON SIDE OF FLAT BED WITH BEDRAILS: A LOT
TOILETING: A LOT
EATING MEALS: A LITTLE
DAILY ACTIVITIY SCORE: 17
HELP NEEDED FOR BATHING: A LITTLE

## 2025-05-07 ASSESSMENT — PAIN - FUNCTIONAL ASSESSMENT
PAIN_FUNCTIONAL_ASSESSMENT: 0-10

## 2025-05-07 NOTE — PROGRESS NOTES
Speech-Language Pathology    Speech-Language Pathology Clinical Swallow Evaluation    Patient Name: Inge Clark  MRN: [id number]  : 1945  Today's Date: 25  Start Time: 1100  Stop Time: 1111  Time Calculation (min): 11 min      ASSESSMENT  Impressions:  Oral and pharyngeal phases of the swallow appear to be within normal limits. No further SLP services are indicated.   Prognosis: Good    PLAN    RECOMMENDATIONS:  Is MBSS recommended? No; no pharyngeal dysphagia suspected.  Solid consistency: Regular (IDDSI level 7)  Liquid consistency: Thin (IDDSI 0)  Medication administration: Whole in thin liquid, Whole in puree    Compensatory swallow strategies:  - Upright positioning for all PO intake    Recommended frequency/duration:  Skilled SLP services recommended: No    SUBJECTIVE    PMHx relevant to rehab:   Inge Clark is a 80 y.o. female with a PMHx of vascular dementia, dysphagia, HTN/HLD, T2DM (2025 8.4%), who presented to [facility] on 25 with a chief complaint of altered mental status. Unable to reach out to . Per patient, she is not sure why she came to the hospital. States she lives with her , who brought her to the hospital because he was concerned about her condition. Patient is pleasantly demented, couldn't recall her last name/year as well as 's name. Per ED's note, she was found to be mumbling at home, and had not been able to ambulate well at home. Per patient, she has a cane at home, but she walks around okay without it. States she is constipated, but denies any acute complaints. Denies associated HA, fever/chills, nausea/vomiting, chest pain/discomfort, orthopnea/PND, SOB, palpitations, abdominal pain, weakness, changes to weight, appetite, or urination. Denies any falls/trauma, recent illness or sick contacts.   Chief complaint: Pt was admitted on 25 due to   Chief Complaint   Patient presents with    Altered Mental Status   . She was found to  have Metabolic encephalopathy.    Relevant imaging results:  Chest X-Ray:  5/6/25  IMPRESSION:  No acute cardiopulmonary process is evident.    General Visit Information:  SLP Received On: 05/07/25  Patient Class: Inpatient  Living Environment: Home  Ordering Physician: Dr. Casillas  Reason for Referral: altered mental status with concern for dysphagia  Prior to Session Communication: Bedside nurse    RN cleared pt to participate in session and reported that she has been eating well.     Pt reported that she feels fine.      [employer] Diet: Regular/thin  Current Diet : regular, thin    Status at time of evaluation:  Pain Assessment  Pain Assessment: 0-10  0-10 (Numeric) Pain Score: 0 - No pain    Pt was pleasantly confused for session.  Orientation: Oriented to self  Ability to follow functional commands: WFL  Nutritional status: Appears well-nourished/no concerns    Respiratory status: Room air  Baseline Vocal Quality: Normal  Volitional Cough: Strong  Volitional Swallow: Within Functional Limits  Patient positioning: Upright in bed      OBJECTIVE  Clinical swallow evaluation completed and consisted of oral motor assessment and PO trials (cracker, water and apple sauce).    ORAL PHASE: Natural dentition in good condition. Oral mucosa were pink, moist, and free of obvious lesions. Lingual strength and ROM were good. Labial strength/ROM were good. Labial seal was adequate. Mastication of regular solids was good. A/P transit and oral clearance were good.    PHARYNGEAL PHASE: Laryngeal elevation was visualized or palpated with all trials, however adequacy of hyolaryngeal elevation/excursion cannot be determined at bedside. No immediate or delayed s/sx aspiration/penetration were observed with any consistencies.    Was 3oz challenge administered: Yes; pt drank 3oz of thin liquid in one attempt, without breaking, with no overt s/sx aspiration/penetration observed.     Treatment/Education:  Results and recommendations were  relayed to: Patient and nursing  Education provided: Yes   Learner: Patient   Barriers to learning: Cognitive limitations barrier   Method of teaching: Verbal and Written   Topic: role of ST and recommended safe swallow strategies   Outcome of teaching: Needs reinforcement  Treatment provided: No

## 2025-05-07 NOTE — PROGRESS NOTES
Physical Therapy    Physical Therapy Evaluation    Patient Name: Inge Clark  MRN: [id number]  Department: [address]  Room: [address] [place] [address]  Today's Date: 5/7/2025   Time Calculation  Start Time: 1415  Stop Time: 1437  Time Calculation (min): 22 min    Assessment/Plan   PT Assessment  PT Assessment Results: Decreased endurance, Impaired balance, Decreased mobility, Decreased cognition  Rehab Prognosis: Fair  Barriers to Discharge Home: Cognition needs  Cognition Needs: 24hr supervision for safety awareness needed  Evaluation/Treatment Tolerance: Patient limited by fatigue  Medical Staff Made Aware: Yes  Strengths: Support of Caregivers  Barriers to Participation: Ability to acquire knowledge, Comorbidities, Housing layout, Insight into problems  End of Session Communication: Bedside nurse  Assessment Comment: Patient demonstrates difficulty following instructions which limits her funcitonal mobility at this time. Patient requires assist of one person. Rec LOW intensity PT intervention.  End of Session Patient Position: Bed, 3 rail up, Alarm on  IP OR SWING BED PT PLAN  Inpatient or Swing Bed: Inpatient  PT Plan  Treatment/Interventions: Bed mobility, Transfer training, Gait training, Stair training, Balance training, Strengthening, Endurance training  PT Plan: Ongoing PT  PT Frequency: 2 times per week  PT Discharge Recommendations: Low intensity level of continued care  Equipment Recommended upon Discharge: Wheeled walker (owns)  PT Recommended Transfer Status: Assist x1  PT - OK to Discharge: Yes (per PT POC)    Subjective   General Visit Information:  General  Reason for Referral: Impaired functional mobility; metabolic encephalopathy  Referred By: Justin Casillas  Past Medical History Relevant to Rehab: vascular dementia, dysphagia, HTN/HLD, T2DM (4/2025 8.4%)  Family/Caregiver Present: No  Co-Treatment: OT  Co-Treatment Reason: Maximize patients functional mobility and outcomes  Prior to Session Communication:  Bedside nurse  Patient Position Received: Bed, 3 rail up, Alarm on  General Comment: Patient pleasantly confused, agreeable to therapy assessment  Home Living:  Home Living  Type of Home: House  Lives With: Spouse  Home Adaptive Equipment: Walker rolling or standard (2WW)  Home Layout: Two level, Stairs to alternate level with rails  Home Living Comments: Patient is unable to provide home set up information beyond a two story home. Per EMR patient resides on first floor, 3 ALE[address]  Prior Level of Function:  Prior Function Per Pt/Caregiver Report  Level of [place]: Needs assistance with ADLs, Needs assistance with homemaking, Needs assistance with functional transfers  Receives Help From: Family  ADL Assistance: Needs assistance  Homemaking Assistance: Needs assistance  Ambulatory Assistance: Needs assistance  Precautions:  Precautions  Medical Precautions: Fall precautions (purewick, IV, masimo)  Precautions Comment: Heparin therapeutic      Date/Time Vitals Session Patient Position Pulse Resp SpO2 BP MAP (mmHg)    05/07/25 1416 --  --  84  --  98 %  --  --                 Objective   Pain:  Pain Assessment  Pain Assessment: 0-10  0-10 (Numeric) Pain Score: 0 - No pain  Cognition:  Cognition  Overall Cognitive Status: Impaired  Orientation Level: Disoriented to place, Disoriented to time, Disoriented to situation, Disoriented to person    General Assessments:     Activity Tolerance  Endurance: Tolerates 10 - 20 min exercise with multiple rests    Sensation  Light Touch: No apparent deficits    Strength  Strength Comments: Funtionally B LE 4-/5    Coordination  Movements are Fluid and Coordinated: Yes    Postural Control  Postural Control: Within Functional Limits    Static Sitting Balance  Static Sitting-Balance Support: No upper extremity supported, Feet supported  Static Sitting-Level of Assistance: Close supervision  Dynamic Sitting Balance  Dynamic Sitting-Balance Support: No upper extremity supported, Feet  supported  Dynamic Sitting-Level of Assistance: Contact guard  Dynamic Sitting-Balance: Lateral lean    Static Standing Balance  Static Standing-Balance Support: Bilateral upper extremity supported  Static Standing-Level of Assistance: Contact guard  Functional Assessments:  ADL  ADL's Addressed: Yes (Patient incontinent of bowel and bladder, required two linen changes and max A for hygiene)    Bed Mobility  Bed Mobility: Yes  Bed Mobility 1  Bed Mobility 1: Supine to sitting, Sitting to supine  Level of Assistance 1: Moderate assistance  Bed Mobility Comments 1: Patient demonstrates difficulty following instructions to complete    Transfers  Transfer: Yes  Transfer 1  Transfer From 1: Sit to, Stand to  Transfer to 1: Sit, Stand  Technique 1: Sit to stand, Stand to sit  Transfer Level of Assistance 1: Contact guard    Ambulation/Gait Training  Ambulation/Gait Training Performed: Yes  Ambulation/Gait Training 1  Surface 1: Level tile  Device 1: No device  Assistance 1: Contact guard  Quality of Gait 1:  (decreased step length and height)  Comments/Distance (ft) 1: attempts to ambulate, initially along bedside without success. Patient demonstrates difficulty following instructions    Outcome Measures:  [facility] Basic Mobility  Turning from your back to your side while in a flat bed without using bedrails: A lot  Moving from lying on your back to sitting on the side of a flat bed without using bedrails: A lot  Moving to and from bed to chair (including a wheelchair): A little  Standing up from a chair using your arms (e.g. wheelchair or bedside chair): A little  To walk in hospital room: A little  Climbing 3-5 steps with railing: A little  Basic Mobility - Total Score: 16    Encounter Problems       Encounter Problems (Active)       Mobility       STG - Patient will ambulate with walker 50' CGA  (Progressing)       Start:  05/07/25    Expected End:  05/21/25            STG - Patient will ascend and descend four stairs B  rails CGA  (Progressing)       Start:  05/07/25    Expected End:  05/21/25               PT Transfers       STG - Patient will perform bed mobility independently  (Progressing)       Start:  05/07/25    Expected End:  05/21/25            STG - Patient will transfer sit to and from stand SBA  (Progressing)       Start:  05/07/25    Expected End:  05/21/25               Pain - Adult              Education Documentation  Mobility Training, taught by Ely Ruiz, PT at 5/7/2025  2:55 PM.  Learner: Patient  Readiness: Acceptance  Method: Explanation  Response: Verbalizes Understanding, Needs Reinforcement  Comment: Importance of continued therapy intervention and staff assist for all mobility    Education Comments  No comments found.

## 2025-05-07 NOTE — CARE PLAN
The patient's goals for the shift include  KASANDRA    The clinical goals for the shift include maintain patient safety

## 2025-05-07 NOTE — SIGNIFICANT EVENT
Palliative consultation request received.   Discussed with Dr. Tenorio who states tomorrow would be best day to begin discussions  Will follow up 5/8 05/07/25 at 2:46 PM - PRAVIN Sequeira-CNP

## 2025-05-07 NOTE — PROGRESS NOTES
Pharmacy Medication History Review    Inge Clark is a 80 y.o. female admitted for Metabolic encephalopathy. Pharmacy reviewed the patient's [id number] medications and allergies for accuracy.    The list below reflectives the updated PTA list. Please review each medication in order reconciliation for additional clarification and justification.  Medications Prior to Admission   Medication Sig Dispense Refill Last Dose/Taking    atenolol (Tenormin) 50 mg tablet Take 1 tablet (50 mg) by mouth once daily. 30 tablet 2     atorvastatin (Lipitor) 40 mg tablet Take 1 tablet (40 mg) by mouth once daily. 90 tablet 3     blood sugar diagnostic (OneTouch Ultra Test) TEST ONCE DAILY 200 strip 3     blood-glucose meter,continuous misc Use as instructed 1 each 0     donepezil (Aricept) 10 mg tablet Take 1 tablet (10 mg) by mouth once daily at bedtime. 30 tablet 11     FreeStyle Elsie 14 Day Sensor kit Use as instructed 2 each 0     glipiZIDE 2.5 mg tablet Take 2.5 mg by mouth once daily. 30 tablet 11     mirtazapine (Remeron) 15 mg tablet Take 1 tablet (15 mg) by mouth once daily at bedtime. 30 tablet 2     potassium chloride CR (Klor-Con M20) 20 mEq ER tablet Take 1 tablet (20 mEq) by mouth once daily. Do not crush or chew. 30 tablet 11         The list below reflectives the updated allergy list. Please review each documented allergy for additional clarification and justification.  Allergies  Reviewed by Deon Li RN on 5/6/2025        Severity Reactions Comments    Grass Pollen Not Specified Unknown             Below are additional concerns with the patient's PTA list.  Confirmed medications with patient's  over the phone.     Fabrice Cardoza RPh

## 2025-05-07 NOTE — CONSULTS
"Nutrition Initial Assessment:   Nutrition Assessment    Reason for Assessment: Admission nursing screening (MST=2; Unsure of weight loss)    Patient is a 80 y.o. female with h/o vascular dementia, now presenting with increased AMS.    Metabolic encephalopathy felt to be 2/2 electrolyte imbalances vs worsening of underlying dementia.     However, mentation improved today compared to yesterday. Question if poor PO intake prior to admission contributing.     Evaluated by speech therapy earlier today (5/7), cleared for regular diet with thin liquids.     PMH: Vascular dementia, Dysphagia, HTN/HLD, & T2DM      Nutrition History:  Food and Nutrient History: Visit made, pt resting in bed at that time. Able to answer some questions but overall a limited historian. Reports she consumes x3 meals/day at home. States she was eating well at home but not as good while admitted. Question accuracy of report. Lunch tray untouched at visit. Reports she likes ice cream so will provide trial of Mighty Shakes & Magic Cups. No additional needs at this time.  Food Allergy:  (None)     Anthropometrics:  Height: 160 cm (5' 3\")   Weight: 56.2 kg (124 lb)   BMI (Calculated): 21.97  IBW/kg (Dietitian Calculated): 52.3 kg  Percent of IBW: 107 %    Weight History:   Wt Readings from Last 50 Encounters:   05/06/25 56.2 kg (124 lb) --> Est admit wt, copied from (3/21/25 stated wt)   04/10/25 57.6 kg (127 lb) --> Used for calculations   03/21/25 56.2 kg (124 lb) --> Stated   02/20/25 60.3 kg (133 lb)   01/27/25 60.8 kg (134 lb)   10/11/24 59.1 kg (130 lb 3.2 oz)   08/08/24 53.8 kg (118 lb 9.6 oz)   05/23/24 54.4 kg (120 lb)   03/29/24 58.1 kg (128 lb)     Weight Change %:  Weight History / % Weight Change: Current estimated admit weight was coped from (3/21/25) stated weight. Does not appear to be accurate. Unable to assess for weight loss at this time given limited data.    Nutrition Focused Physical Exam Findings:  Subcutaneous Fat Loss: "   Orbital Fat Pads: Mild-Moderate (slight dark circles and slight hollowing)  Buccal Fat Pads: Well nourished (full, rounded cheeks)  Triceps: Well nourished (ample fat tissue)  Muscle Wasting:  Temporalis: Mild-Moderate (slight depression)  Pectoralis (Clavicular Region): Mild-Moderate (some protrusion of clavicle)  Deltoid/Trapezius: Mild-Moderate (slight protrusion of acromion process)  Interosseous: Severe (depressed area between thumb and forefinger)  Edema:  Edema: none  Physical Findings:  Hair: Negative  Eyes: Negative  Nails: Negative  Skin: Positive (R upper flank wound & Coccyx wound)  Digestive System Findings: Constipation  Mouth Findings:  (None)    Nutrition Significant Labs:  BMP Trend:   Results from last 7 days   Lab Units 05/07/25  0325 05/06/25  1549   GLUCOSE mg/dL 194* 309*   CALCIUM mg/dL 8.6 8.9   SODIUM mmol/L 134* 133*   POTASSIUM mmol/L 2.9* 2.8*   CO2 mmol/L 26 26   CHLORIDE mmol/L 96* 93*   BUN mg/dL 16 22   CREATININE mg/dL 0.81 1.17*      Nutrition Specific Medications:  Scheduled medications  Scheduled Medications[1]  Continuous medications  Continuous Medications[2]  PRN medications  PRN Medications[3]    I/O:   LBM: 5/7 ;  Stool Appearance: Loose (05/07/25 1106)    Dietary Orders (From admission, onward)       Start     Ordered    05/06/25 2316  May Not Participate in Room Service  ( ROOM SERVICE MAY NOT PARTICIPATE)  Once        Question:  .  Answer:  Yes    05/06/25 2315 05/06/25 2253  Adult diet Consistent Carb; CCD 60 gm/meal; Soft and bite sized 6; 1:1 Feeding  Diet effective now        Question Answer Comment   Diet type Consistent Carb    Carb diet selection: CCD 60 gm/meal    Texture Soft and bite sized 6    Select tray type: 1:1 Feeding        05/06/25 2253 05/06/25 2111  Oral nutritional supplements  Until discontinued        Question Answer Comment   Deliver with Breakfast    Deliver with Lunch    Deliver with Dinner    Select supplement: Magic Cup        05/06/25  2110             Estimated Needs:   Total Energy Estimated Needs in 24 hours (kCal):  (0372-1889)  Method for Estimating Needs: 28-32 kcals/kg x IBW  Total Protein Estimated Needs in 24 Hours (g):  (60+)  Method for Estimating 24 Hour Protein Needs: ~1.2 g/kg x IBW  Total Fluid Estimated Needs in 24 Hours (mL):  (3567-9266)  Method for Estimating 24 Hour Fluid Needs: 1mL/kcal or per team        Nutrition Diagnosis   Malnutrition Diagnosis  Patient has Malnutrition Diagnosis:  (Unable to accurately assess given pt is a poor historian & limited information available at time of assessment.)    Nutrition Diagnosis  Patient has Nutrition Diagnosis: Yes  Diagnosis Status (1): New  Nutrition Diagnosis 1: Inadequate oral intake  Related to (1): dementia c/b metabolic encephalopathy  As Evidenced by (1): consuming 0-33% of meals since admission       Nutrition Interventions/Recommendations      Nutrition Recommendations:  Per SLP's most recent recommendations, consider liberalizing to a regular diet w/ thin liquids given limited PO intake    Obtain a measured weight for this admission. Current weight was a stated weight from (3/21/25)    Continue daily appetite stimulant      Nutrition Interventions/Goals:   Medical Food Supplement: Commercial beverage medical food supplement therapy  Goal: Mighty Shakes BID (220 kcals/6g protein each) + Magic Cup daily (290 kcals/9g protein each)      Nutrition Monitoring and Evaluation   Food/Nutrient Related History Monitoring  Monitoring and Evaluation Plan: Estimated Energy Intake  Estimated Energy Intake: Energy intake 50 -75% of estimated energy needs    Time Spent (min): 60 minutes            [1] atenolol, 50 mg, oral, Daily  atorvastatin, 40 mg, oral, Daily  [START ON 5/8/2025] docusate sodium, 100 mg, oral, Daily  donepezil, 10 mg, oral, Nightly  [Held by provider] glipiZIDE, 2.5 mg, oral, Daily  insulin lispro, 0-10 Units, subcutaneous, Before meals & nightly  mirtazapine, 15 mg,  oral, Nightly  [2] heparin, 0-4,500 Units/hr, Last Rate: 900 Units/hr (05/07/25 0359)  [3] PRN medications: acetaminophen **OR** acetaminophen **OR** acetaminophen, bisacodyl, heparin, melatonin

## 2025-05-07 NOTE — PROGRESS NOTES
Occupational Therapy    Evaluation    Patient Name: Inge Clark  MRN: [id number]  Department: [address]  Room: [address] [place], [place] [address]  Today's Date: 5/7/2025  Time Calculation  Start Time: 1416  Stop Time: 1437  Time Calculation (min): 21 min    Assessment  IP OT Assessment  OT Assessment: Pt is a 79 yo F referred to occupational therapy for impaired self-care and functional mobility 2/2 hospitalization for metabolic encephalopathy. Pt presented to [facility]. Pt demonstrates decreased endurance and functional mobility this date. Pt w/ increased difficulty sequencing tasks and required increased VCs to complete tasks. Pt required total A for toileting and perineal hygiene. Pt would benefit from continued OT services at the LOW intensity level to increase safety and functional independence.  Prognosis: Good  Barriers to Discharge Home: No anticipated barriers  Evaluation/Treatment Tolerance: Patient tolerated treatment well  Medical Staff Made Aware: Yes  End of Session Communication: Bedside nurse  End of Session Patient Position: Bed, 3 rail up, Alarm on  Plan:  Treatment Interventions: ADL retraining, Functional transfer training, UE strengthening/ROM, Endurance training, Cognitive reorientation, Patient/family training, Equipment evaluation/education, Compensatory technique education  OT Frequency: 2 times per week  OT Discharge Recommendations: Low intensity level of continued care  OT Recommended Transfer Status: Stand by assist, Assist of 1  OT - OK to Discharge: Yes(per OT POC)    Subjective   Current Problem:  1. Metabolic encephalopathy        2. SIRS (systemic inflammatory response syndrome) (Multi)        3. Fever and chills          General:  General  Reason for Referral: Pt is a 79 yo F referred to occupational therapy for impaired self-care and functional mobility 2/2 hospitalization for metabolic encephalopathy. Pt presented to [facility]  Referred By: Keesha Dorado MD  Past Medical History Relevant to  Rehab: Vascular dementia, dysphagia, HTN/HLD, T2DM, osteoarthritis  Family/Caregiver Present: No  Co-Treatment: PT  Co-Treatment Reason: To maximize pt safety and outcomes  Prior to Session Communication: Bedside nurse  Patient Position Received: Bed, 3 rail up, Alarm off, not on at start of session  Preferred Learning Style: visual, kinesthetic  General Comment: Pt pleasantly confused and agreeable to therapy session. Pt cleared by RN prior to session.  Precautions:  Medical Precautions: Fall precautions, Swallowing precautions  Precautions Comment: IV, tele, josé miguelodaniellack     Date/Time Vitals Session Patient Position Pulse Resp SpO2 BP MAP (mmHg)    05/07/25 1416 --  --  84  --  98 %  --  --           Pain:  Pain Assessment  Pain Assessment: 0-10  0-10 (Numeric) Pain Score: 0 - No pain    Objective   Cognition:  Overall Cognitive Status: Impaired at baseline  Arousal/Alertness: Delayed responses to stimuli  Orientation Level: Disoriented X4  Memory: Exceptions to WFL  Insight: Severe  Impulsive: Mildly    Home Living:  Type of Home: House  Lives With: Spouse  Home Adaptive Equipment: Walker rolling or standard, Cane  Home Layout: Two level, Bed/bath upstairs  Home Access: Stairs to enter without rails  Entrance Stairs-Number of Steps: 3  Home Living Comments: Pt is a poor historian, home setup obtained from EMR   Prior Function:  Level of [place]: Needs assistance with ADLs, Needs assistance with homemaking, Needs assistance with functional transfers  Receives Help From: Family  ADL Assistance: Needs assistance (Pt reports  assists her)  Homemaking Assistance: Needs assistance ( completes)  Ambulatory Assistance: Needs assistance (w/ FWW)    ADL:  Eating Assistance: Minimal (Per EMR, pt's  was cutting up food for her, able to eat ind)  Grooming Assistance: Minimal  Bathing Assistance: Maximal  UE Dressing Assistance: Minimal  LE Dressing Assistance: Maximal  LE Dressing Deficit:  Don/doff R sock, Don/doff L sock  Toileting Assistance with Device: Total  Toileting Deficit: Perineal hygiene, Incontinent, Urinary Catheter  Functional Assistance: Minimal  ADL Comments: Pt required mod A for bed mobility and CGA for STS at EOB. Pt incontinent and required total A for toileting and perineal hygiene standing at EOB. Pt required max A for LE dressing. Otehr ADLs anticipated.  Activity Tolerance:  Endurance: Tolerates 10 - 20 min exercise with multiple rests  Bed Mobility/Transfers:   Bed Mobility  Bed Mobility: Yes  Bed Mobility 1  Bed Mobility 1: Supine to sitting  Level of Assistance 1: Moderate assistance, Maximum verbal cues  Bed Mobility Comments 1: max verbal cues for sequencing task, mod A to bring BLEs off bed and erect trunk  Bed Mobility 2  Bed Mobility  2: Sitting to supine  Level of Assistance 2: Maximum assistance, +2  Bed Mobility Comments 2: max VCs for sequencing task    Transfers  Transfer: Yes  Transfer 1  Transfer From 1: Bed to  Transfer to 1: Stand  Technique 1: Sit to stand, Stand to sit  Transfer Level of Assistance 1: Contact guard  Trials/Comments 1: max VCs for sequencing and completion of task, hand held assist provided    Sitting Balance:  Static Sitting Balance  Static Sitting-Balance Support: Feet supported  Static Sitting-Level of Assistance: Contact guard  Dynamic Sitting Balance  Dynamic Sitting-Balance Support: Feet supported  Dynamic Sitting-Level of Assistance: Contact guard  Dynamic Sitting-Balance: Forward lean  Standing Balance:  Static Standing Balance  Static Standing-Balance Support: Bilateral upper extremity supported  Static Standing-Level of Assistance: Contact guard  Dynamic Standing Balance  Dynamic Standing-Balance Support: Bilateral upper extremity supported  Dynamic Standing-Level of Assistance: Contact guard  Dynamic Standing-Balance: Turning    Vision:   Vision - Basic Assessment  Current Vision: Wears glasses only for reading  Sensation:  Light  Touch: No apparent deficits  Strength:  Strength Comments: BUE functionally 4-/5  Coordination:  Movements are Fluid and Coordinated: Yes   Hand Function:  Hand Function  Gross Grasp: Functional  Coordination: Functional  Extremities:    RUE: Within Functional Limits  LUE: Within Functional Limits    Outcome Measures:   [facility] Daily Activity  Putting on and taking off regular lower body clothing: A lot  Bathing (including washing, rinsing, drying): A lot  Putting on and taking off regular upper body clothing: A lot  Toileting, which includes using toilet, bedpan or urinal: A lot  Taking care of personal grooming such as brushing teeth: A little  Eating Meals: A little  Daily Activity - Total Score: 14    Education Documentation  Body Mechanics, taught by Emely Singh OT at 5/7/2025  3:02 PM.  Learner: Patient  Readiness: Acceptance  Method: Explanation  Response: Needs Reinforcement  Comment: Pt educated on POC, DC recs, safety and body mechanics when completing transfers and ADLs    ADL Training, taught by Emely Singh OT at 5/7/2025  3:02 PM.  Learner: Patient  Readiness: Acceptance  Method: Explanation  Response: Needs Reinforcement  Comment: Pt educated on POC, DC recs, safety and body mechanics when completing transfers and ADLs    Goals:   Encounter Problems       Encounter Problems (Active)       ADLs       Patient will perform UB and LB bathing with minimal assist  level of assistance and PRN bathroom equipment.       Start:  05/07/25    Expected End:  05/21/25            Patient will complete daily grooming tasks with minimal assist  level of assistance and PRN adaptive equipment while supported sitting.       Start:  05/07/25    Expected End:  05/21/25            Patient will complete toileting including hygiene clothing management/hygiene with minimal assist  level of assistance and PRN bathroom equipment.       Start:  05/07/25    Expected End:  05/21/25               BALANCE       Pt will  maintain static/dynamic standing balance during ADL task with contact guard assist level of assistance in order to demonstrate decreased risk of falling and improved postural control.       Start:  05/07/25    Expected End:  05/21/25               TRANSFERS       Patient will perform bed mobility stand by assist level of assistance and bed rails in order to improve safety and independence with mobility       Start:  05/07/25    Expected End:  05/21/25            Patient will complete functional transfer to BSC/chair with least restrictive device with stand by assist level of assistance.       Start:  05/07/25    Expected End:  05/21/25

## 2025-05-07 NOTE — HOSPITAL COURSE
Brief HPI:  80 y.o. female with PMHX vascular dementia, dysphagia, HTN/HLD, T2DM (4/2025 8.4%) admitted for acute metabolic encephalopathy 2/2 poor intake and incidental infrarenal thrombus.      noted acute change in speech, sudden weakness, and numbness which prompted him to bring her to the ED.         ED Course:  Patient noted to be febrile at 101.5 degrees F and tachycardic at .  Labs most significant for mild leukocytosis with neutrophilia, thrombocytopenia, hypokalemia, and elevated lactate.  UA showed negative for leuk esterase/nitrite.  Troponin and EKG were unremarkable.  Negative COVID/flu/RSV.  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  Otherwise, CXR, CT head, and US DVT BLE negative.  Patient received 1L NS bolus, Vanco/Zosyn, and potassium and magnesium repletion.  Endovascular cardiology was consulted, who recommended to start on heparin drip.      Floors:  Patient remained afebrile for remainder of the course.  Mentation appeared to return to her baseline after receiving fluids, electrolyte repletion, and nutrition.  Endovascular cardiology recommended therapeutic AC and to follow-up outpatient for surveillance ultrasound. Heparin gtt was stopped and bridging with Lovenox to warfarin was started.  Hospice was consulted, for which discussion was had with patient, spouse, and son who agreed to change code status to DNR/DNI.

## 2025-05-07 NOTE — CONSULTS
Inpatient consult to [facility]  Consult performed by: Bhavana Reyna PA-C  Consult ordered by: Justin Casillas DO        History Of Present Illness:    Inge Clark is a 80 y.o. female presenting with with a chief complaint of altered mental status, difficulty with ambulation.   Patient is a poor historian. No family or collateral available.   History obtained via chart review.      Last Recorded Vitals:  Vitals:    05/06/25 2246 05/07/25 0014 05/07/25 0416 05/07/25 0720   BP: 126/64 107/56 117/53 119/58   BP Location:  Right arm Right arm Right arm   Patient Position:  Lying Lying Lying   Pulse:  65 66 78   Resp:  16 16 16   Temp:  36.7 °C (98.1 °F) 36.2 °C (97.2 °F) 36.9 °C (98.4 °F)   TempSrc:  Temporal Temporal Temporal   SpO2:  94% 94% 96%   Weight:       Height:           Last Labs:  CBC - 5/7/2025:  3:25 AM  9.1 10.8 84    31.5      CMP - 5/7/2025:  3:25 AM  8.6 6.8 25 --- 1.1   1.8 3.3 26 86      PTT - 5/6/2025:  6:45 PM  1.5   16.3 30     Troponin I, High Sensitivity   Date/Time Value Ref Range Status   05/06/2025 05:16 PM 8 0 - 13 ng/L Final   05/06/2025 03:49 PM 8 0 - 13 ng/L Final   04/13/2025 12:42 PM 4 0 - 13 ng/L Final     BNP   Date/Time Value Ref Range Status   04/13/2025 11:21 AM 36 0 - 99 pg/mL Final     HEMOGLOBIN A1c   Date/Time Value Ref Range Status   01/28/2025 12:00 AM 7.7 (H) <5.7 % of total Hgb Final     Comment:     For someone without known diabetes, a hemoglobin A1c  value of 6.5% or greater indicates that they may have   diabetes and this should be confirmed with a follow-up   test.     For someone with known diabetes, a value <7% indicates   that their diabetes is well controlled and a value   greater than or equal to 7% indicates suboptimal   control. A1c targets should be individualized based on   duration of diabetes, age, comorbid conditions, and   other considerations.     Currently, no consensus exists regarding use of  hemoglobin A1c for diagnosis  of diabetes for children.           Hemoglobin A1C   Date/Time Value Ref Range Status   04/26/2025 07:15 AM 8.4 (H) 4.3 - 5.6 % Final     Comment:     American Diabetes Association guidelines indicate that patients with HgbA1c in the range 5.7-6.4% are at increased risk for development of diabetes, and intervention by lifestyle modification may be beneficial. HgbA1c greater or equal to 6.5% is considered diagnostic of diabetes.   04/25/2025 05:10 AM 8.3 (H) 4.3 - 5.6 % Final     Comment:     American Diabetes Association guidelines indicate that patients with HgbA1c in the range 5.7-6.4% are at increased risk for development of diabetes, and intervention by lifestyle modification may be beneficial. HgbA1c greater or equal to 6.5% is considered diagnostic of diabetes.   03/29/2024 04:00 PM 8.0 (H) see below % Final     LDL-CHOLESTEROL   Date/Time Value Ref Range Status   01/28/2025 12:00 AM 63 mg/dL (calc) Final     Comment:     Reference range: <100     Desirable range <100 mg/dL for primary prevention;    <70 mg/dL for patients with CHD or diabetic patients   with > or = 2 CHD risk factors.     LDL-C is now calculated using the Haily   calculation, which is a validated novel method providing   better accuracy than the Friedewald equation in the   estimation of LDL-C.   Anthony COLEY et al. JACK. 2013;310(19): 7578-7071   (http://education.Sumerian.24tidy/faq/MBY385)       LDL Calculated   Date/Time Value Ref Range Status   03/29/2024 04:00 PM 44 <=99 mg/dL Final     Comment:                                 Near   Borderline      AGE      Desirable  Optimal    High     High     Very High     0-19 Y     0 - 109     ---    110-129   >/= 130     ----    20-24 Y     0 - 119     ---    120-159   >/= 160     ----      >24 Y     0 -  99   100-129  130-159   160-189     >/=190       VLDL   Date/Time Value Ref Range Status   03/29/2024 04:00 PM 46 (H) 0 - 40 mg/dL Final   12/29/2022 10:37 AM 36 0 - 40 mg/dL Final  "  06/07/2022 10:22 AM 33 0 - 40 mg/dL Final   05/27/2021 08:32 AM 35 0 - 40 mg/dL Final      Last I/O:  I/O last 3 completed shifts:  In: - (0 mL/kg)   Out: 300 (5.3 mL/kg) [Urine:300 (0.1 mL/kg/hr)]  Weight: 56.2 kg     Past Cardiology Tests (Last 3 Years):  EKG:  ECG 12 lead 04/13/2025    Echo:  No results found for this or any previous visit from the past 1095 days.    Ejection Fractions:  No results found for: \"EF\"  Cath:  No results found for this or any previous visit from the past 1095 days.    Stress Test:  No results found for this or any previous visit from the past 1095 days.    Imaging:  Vascular US LE Venous Duplex b/l (05/06/2025):  IMPRESSION:  No sonographic evidence for deep vein thrombosis within the evaluated  veins of the bilateral lower extremity.  23 mm left popliteal fossa fluid collection.      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 At most this  result in 85% stenosis in the infrarenal cava.      No other acute process in the chest, abdomen, or pelvis.      Chronic compression fractures of L2 and L3 with 25% height loss, not  significantly changed.      Chronic fracture of the inferior angle of the right scapula.    Past Medical History:  She has a past medical history of Elevated troponin I level (08/15/2024), Essential (primary) hypertension (12/29/2022), Insect bite (nonvenomous) of scalp, initial encounter (CODE) (06/05/2017), Listeriosis, unspecified (05/23/2016), Other conditions influencing health status (01/29/2018), Pyelonephritis of left kidney (04/11/2023), Type 2 diabetes mellitus with other specified complication (12/29/2022), Unilateral primary osteoarthritis, right hip (12/09/2019), and Vitamin D deficiency, unspecified.    Past Surgical History:  She has a past surgical history " that includes Other surgical history (09/30/2019).      Social History:  She reports that she has never smoked. She has never used smokeless tobacco. She reports current alcohol use of about 1.0 standard drink of alcohol per week. She reports that she does not use drugs.    Family History:  Family History[1]     Allergies:  Grass pollen    Inpatient Medications:  Scheduled Medications[2]  PRN Medications[3]  Continuous Medications[4]  Outpatient Medications:  Current Outpatient Medications   Medication Instructions    acetaminophen (TYLENOL) 650 mg, oral, Every 6 hours PRN    atenolol (TENORMIN) 50 mg, oral, Daily    atorvastatin (LIPITOR) 40 mg, oral, Daily    blood sugar diagnostic (OneTouch Ultra Test) TEST ONCE DAILY    blood-glucose meter,continuous misc Use as instructed    docusate sodium (COLACE) 100 mg, oral, 2 times daily    donepezil (ARICEPT) 10 mg, oral, Nightly    enoxaparin (LOVENOX) 40 mg, subcutaneous, Every 24 hours    FreeStyle Elsie 14 Day Sensor kit Use as instructed    glipiZIDE 2.5 mg, oral, Daily    insulin lispro 0-5 Units, subcutaneous, 3 times daily before meals, Take as directed per insulin instructions.    melatonin 3 mg, oral, Nightly PRN    mirtazapine (REMERON) 15 mg, oral, Nightly    OneTouch Delica Plus Lancet 30 gauge misc     OneTouch Ultra2 Meter misc 1 kit, Topical, Daily    potassium chloride CR (Klor-Con M20) 20 mEq ER tablet 20 mEq, oral, Daily, Do not crush or chew.    zinc oxide 40 % ointment ointment 1 Application, Topical, 2 times daily       Physical Exam:  Physical Exam  Constitutional:       Appearance: Normal appearance.   HENT:      Head: Normocephalic.      Nose: Nose normal.      Mouth/Throat:      Mouth: Mucous membranes are moist.   Eyes:      Conjunctiva/sclera: Conjunctivae normal.   Cardiovascular:      Rate and Rhythm: Normal rate and regular rhythm.      Pulses: Normal pulses.      Heart sounds: Normal heart sounds.   Pulmonary:      Effort: Pulmonary effort  is normal.   Musculoskeletal:         General: No swelling or tenderness.      Cervical back: Neck supple.      Right lower leg: No edema.      Left lower leg: No edema.   Skin:     General: Skin is warm.   Neurological:      Mental Status: Mental status is at baseline.   Psychiatric:         Mood and Affect: Mood normal.         Behavior: Behavior normal.            Assessment/Plan   Inge Clark is a 72-year-old female with a past medical history of advanced vascular dementia, hypertension, hyperlipidemia, diabetes type 2, dysphagia who presented with a chief complaint of altered mental status.  CT was completed upon admission showing thrombus within the IVC.    IVC thrombus (partially occlusive, 11 cm in length from ilio caval bifurcation to the renal veins 85% occlusive at most significant point)    Continue with therapeutic anticoagulation.  May transition to DOAC per insurance coverage.  Will need to follow-up for surveillance ultrasound.  This has been scheduled.  Case discussed with Dr. Casillas, Dr. Ferris.  Will sign off.    Peripheral IV 05/06/25 20 G Anterior;Right Forearm (Active)   Site Assessment Clean;Dry;Intact 05/07/25 0854   Dressing Status Clean;Dry;Occlusive 05/07/25 0854   Number of days: 1       Peripheral IV 05/06/25 20 G Anterior;Left Forearm (Active)   Site Assessment Clean;Dry;Intact 05/07/25 0854   Dressing Status Clean;Dry;Occlusive 05/07/25 0854   Number of days: 1       Code Status:  Full Code    I spent  minutes in the professional and overall care of this patient.        Bhavana Reyna PA-C       [1]   Family History  Problem Relation Name Age of Onset    Stroke Mother      Heart attack Mother      Hypertension Father     [2]   Scheduled medications   Medication Dose Route Frequency    atenolol  50 mg oral Daily    atorvastatin  40 mg oral Daily    bisacodyl  10 mg oral BID    docusate sodium  100 mg oral BID    donepezil  10 mg oral Nightly    [Held by provider] glipiZIDE  2.5  mg oral Daily    insulin lispro  0-10 Units subcutaneous Before meals & nightly    mirtazapine  15 mg oral Nightly    potassium phosphate  21 mmol intravenous Once   [3]   PRN medications   Medication    acetaminophen    Or    acetaminophen    Or    acetaminophen    heparin    melatonin   [4]   Continuous Medications   Medication Dose Last Rate    heparin  0-4,500 Units/hr 900 Units/hr (05/07/25 3799)

## 2025-05-07 NOTE — PROGRESS NOTES
Patient: Inge Clark   Age: 80 y.o.   Gender: female   Room/Bed: 127/127-A     Attending: uJstin Casillas DO  Code Status:  Full Code  Admitted Date: 5/6/2025  3:36 PM    Chief Complaint:  Patient: Inge Clark is a 80 y.o. female with PMHX vascular dementia, dysphagia, HTN/HLD, T2DM (4/2025 8.4%) admitted for acute metabolic encephalopathy 2/2 poor intake and incidental infrarenal thrombus.    OVERNIGHT: No significant events reported.      SUBJECTIVE:  Patient was alert and was able to communicate her needs. She denies chest tightness, SOB, abdominal pain, numbness, tingling, and lightheadedness. She was able to recognize her  as well. He indicated that she has significantly improved compared to when he brought her into the hospital. She appears to be more or less at her baseline.           ALLERGIES PAST MEDICAL HISTORY PAST SURGICAL SURGERY FAMILY HISTORY SOCIAL HISTORY   Allergies[1] Medical History[2] Surgical History[3] Family History[4] Social History[5]           MEDS:      HOME MEDS SCHEDULED MEDS PRN IV MEDS   Current Outpatient Medications   Medication Instructions    acetaminophen (TYLENOL) 650 mg, oral, Every 6 hours PRN    atenolol (TENORMIN) 50 mg, oral, Daily    atorvastatin (LIPITOR) 40 mg, oral, Daily    blood sugar diagnostic (OneTouch Ultra Test) TEST ONCE DAILY    blood-glucose meter,continuous misc Use as instructed    docusate sodium (COLACE) 100 mg, oral, 2 times daily    donepezil (ARICEPT) 10 mg, oral, Nightly    enoxaparin (LOVENOX) 40 mg, subcutaneous, Every 24 hours    FreeStyle Elsie 14 Day Sensor kit Use as instructed    glipiZIDE 2.5 mg, oral, Daily    insulin lispro 0-5 Units, subcutaneous, 3 times daily before meals, Take as directed per insulin instructions.    melatonin 3 mg, oral, Nightly PRN    mirtazapine (REMERON) 15 mg, oral, Nightly    OneTouch Delica Plus Lancet 30 gauge misc     OneTouch Ultra2 Meter misc 1 kit, Topical, Daily    potassium chloride CR  (Klor-Con M20) 20 mEq ER tablet 20 mEq, oral, Daily, Do not crush or chew.    zinc oxide 40 % ointment ointment 1 Application, Topical, 2 times daily    Scheduled Medications[6] PRN Medications[7] Continuous Medications[8]               OBJECTIVE:  Physical Exam  Constitutional:       Appearance: Normal appearance.   Cardiovascular:      Rate and Rhythm: Normal rate and regular rhythm.      Pulses: Normal pulses.      Heart sounds: Normal heart sounds.   Pulmonary:      Effort: Pulmonary effort is normal.      Breath sounds: Normal breath sounds.   Abdominal:      Palpations: Abdomen is soft.      Tenderness: There is no abdominal tenderness. There is no guarding or rebound.   Musculoskeletal:      Right lower leg: No edema.      Left lower leg: No edema.   Skin:     General: Skin is warm and dry.   Neurological:      General: No focal deficit present.      Mental Status: She is alert. Mental status is at baseline.   Psychiatric:         Mood and Affect: Mood normal.         Behavior: Behavior normal.                  VITALS:  Vitals:    05/07/25 0014 05/07/25 0416 05/07/25 0720 05/07/25 1106   BP: 107/56 117/53 119/58    BP Location: Right arm Right arm Right arm    Patient Position: Lying Lying Lying    Pulse: 65 66 78 108   Resp: 16 16 16    Temp: 36.7 °C (98.1 °F) 36.2 °C (97.2 °F) 36.9 °C (98.4 °F)    TempSrc: Temporal Temporal Temporal    SpO2: 94% 94% 96% 99%   Weight:       Height:               Intake/Output Summary (Last 24 hours) at 5/7/2025 1149  Last data filed at 5/7/2025 1106  Gross per 24 hour   Intake 220 ml   Output 500 ml   Net -280 ml         LABS:     CBC:  Results from last 72 hours   Lab Units 05/07/25  0325 05/06/25  1549   WBC AUTO x10*3/uL 9.1 11.6*   RBC AUTO x10*6/uL 3.59* 3.74*   HEMOGLOBIN g/dL 10.8* 11.4*   HEMATOCRIT % 31.5* 32.6*   MCV fL 88 87   MCH pg 30.1 30.5   MCHC g/dL 34.3 35.0   RDW % 13.7 13.7   PLATELETS AUTO x10*3/uL 84* 77*     CMP:  Results from last 72 hours   Lab Units  "05/07/25  0325 05/06/25  1549   SODIUM mmol/L 134* 133*   POTASSIUM mmol/L 2.9* 2.8*   CHLORIDE mmol/L 96* 93*   CO2 mmol/L 26 26   ANION GAP mmol/L 15 17   BUN mg/dL 16 22   CREATININE mg/dL 0.81 1.17*   EGFR mL/min/1.73m*2 74 48*   GLUCOSE mg/dL 194* 309*     Results from last 72 hours   Lab Units 05/07/25  0325 05/06/25  1549   ALBUMIN g/dL 3.3* 3.8   ALK PHOS U/L 86 93   ALT U/L 26 28   AST U/L 25 22   BILIRUBIN TOTAL mg/dL 1.1 1.3*   LIPASE U/L  --  20     Calcium/Phos:   Results from last 72 hours   Lab Units 05/07/25  0325 05/06/25  1549   CALCIUM mg/dL 8.6 8.9   PHOSPHORUS mg/dL 1.8*  --       COAG:   Results from last 72 hours   Lab Units 05/07/25  0325 05/06/25  1845 05/06/25  1549   INR  1.5* 1.5* 1.5*     CRP: No results found for: \"CRP\"    ENDO:  Recent Labs     04/26/25  0715 04/25/25  0510 01/28/25  0000 03/29/24  1600   TSH  --   --  1.24 1.12   HGBA1C 8.4* 8.3* 7.7* 8.0*      CARDIAC:   Results from last 72 hours   Lab Units 05/06/25  1716 05/06/25  1549   TROPHS ng/L 8 8       No data recorded    MICRO/ID:   No results found for the last 90 days.      Lab Results   Component Value Date    URINECULTURE  04/13/2025     Growth indicates contamination with periurethral howard. Repeat culture if clinically indicated.    BLOODCULT Loaded on Instrument - Culture in progress 05/06/2025    BLOODCULT Loaded on Instrument - Culture in progress 05/06/2025         NUTRITION STATUS:           IMAGING:     Imaging  Vascular US Lower Extremity Venous Duplex Bilateral  Result Date: 5/6/2025  No sonographic evidence for deep vein thrombosis within the evaluated veins of the bilateral lower extremity. 23 mm left popliteal fossa fluid collection.   MACRO: None   Signed by: Yemi Zambrano 5/6/2025 8:01 PM Dictation workstation:   [id number]    CT chest abdomen pelvis w IV contrast  Result Date: 5/6/2025  large partially occlusive thrombus within the IVC measuring approximately 11 cm in length extending from the " iliocaval bifurcation and extending just above the renal veins. At most this result in 85% stenosis in the infrarenal cava.   No other acute process in the chest, abdomen, or pelvis.   Chronic compression fractures of L2 and L3 with 25% height loss, not significantly changed.   Chronic fracture of the inferior angle of the right scapula.   MACRO: Marek Trivedi discussed the significance and urgency of this critical finding via [facility] with confirmation of receipt with BRIANNA RUIZ on 5/6/2025 at 6:21 pm.  (**-RCF-**) Findings:  See findings.   Signed by: Marek Trivedi 5/6/2025 6:22 PM Dictation workstation:   [id number]    CT head wo IV contrast  Result Date: 5/6/2025  No acute intracranial abnormality.     MACRO: None.   Signed by: Marek Trivedi 5/6/2025 6:10 PM Dictation workstation:   [id number]    XR chest 2 views  Result Date: 5/6/2025  No acute cardiopulmonary process is evident.   MACRO: None   Signed by: Art Sullivan 5/6/2025 4:29 PM Dictation workstation:   [id number]      Cardiology, Vascular, and Other Imaging  No other imaging results found for the past 2 days           ASSESSMENT & PLAN  Inge Clark is a 80 y.o. female with PMHX vascular dementia, dysphagia, HTN/HLD, T2DM (4/2025 8.4%) admitted for acute metabolic encephalopathy 2/2 poor intake and incidental infrarenal thrombus.    ACUTE MEDICAL ISSUES  #IVC/infrarenal thrombus   ::Incidental finding  - likely from sedentary lifestyle   - CT PE: large partially occlusive thrombus within the IVC measuring approximately 11 cm in length extending from the iliocaval bifurcation and extending just above the renal veins. At most this   result in 85% stenosis in the infrarenal cava.  - Vascular Dr. Wang consulted by ED  Plan:  - Continue Heparin drip  - Pending [facility] for IVC placement eval.     #Metabolic encephalopathy 2/2 electrolyte imbalances vs worsening of underlying dementia - improved  #Vascular  dementia  #Hypokalemia and Hyponatremia likely from low PO intake - improved  - Patient is AAOx1. Has poor PO intake at home.   - CT Head: unremarkable except small vessel ischemic changes  - S/P Vanc/zosyn in ED.   - COVID/Influenza/RSV negative. Pending: blood cultures  Plan:  - Continue home meds- Mirtazapine 15mg and Aricept 10mg nightly  - Replete electrolytes as needed  - Palliative consulted for vascular dementia   - Nutrition consulted for poor oral intake     #Thrombocytopenia - improving  #Anemia  - likely nutrition related or other causes like MDS  - No overt signs of bleeding.   Plan:  - Peripheral smear showing mild RBC polychromasia and pending [id number].   - Continue to monitor.        #AMARI likely pre-renal from low PO intake - improved  - s/p 1L NS bolus in ED  Plan:  - Encourage oral intake  - CTM      RESOLVED MEDICAL ISSUES:  #Constipation  - Last BM was a week ago -> last on 5/7  - Dulcolax and colace PRN    #Fever - resolved  #Leukocytosis - improved  - Patient was noted to be febrile on admission. However, no signs of infection. Leukocytosis seems reactive.  Plan:  - Not continuing antibiotics for now.     CHRONIC MEDICAL ISSUES:  #HTN/HLD: continue atenolol 50mg and atorvastatin 40mg qhs  #T2DM: on glipizide 2.5mg at home. Currently on SSI.  #Dysphagia: on modified diet. Aspiration precautions. SLP evaluated - no dysphagia noted.         Fluids: PRN  Electrolytes: Replete PRN  Nutrition:Soft diet, 1:1 feeding and carb diet  GI Prophylaxis: None  DVT Prophylaxis: Heparin gtt, Reason: Possible IVC placement  BM:      Access: PIV  Antibiotics: None, Vanc/Zosyn (5/6)  Oxygenation: RA    Emergency Contact: Extended Emergency Contact Information  Primary Emergency Contact: Deacon Clark  Home Phone: [phone]  Relation: Spouse  Secondary Emergency Contact: Adriel Clark  Mobile Phone: [phone]  Relation: Son  Preferred language: English   needed? No    Dispo: Possible SNF pending  PT/OT and pending medical clearance.         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    Other conditions influencing health status 01/29/2018    Normal colonoscopy    Pyelonephritis of left kidney 04/11/2023    Type 2 diabetes mellitus with other specified complication 12/29/2022    DM type 2 with diabetic mixed hyperlipidemia    Unilateral primary osteoarthritis, right hip 12/09/2019    Primary osteoarthritis of right hip    Vitamin D deficiency, unspecified     Vitamin D deficiency   [3]   Past Surgical History:  Procedure Laterality Date    OTHER SURGICAL HISTORY  09/30/2019    Tonsillectomy with adenoidectomy   [4]   Family History  Problem Relation Name Age of Onset    Stroke Mother      Heart attack Mother      Hypertension Father     [5]   Social History  Tobacco Use    Smoking status: Never    Smokeless tobacco: Never   Vaping Use    Vaping status: Never Used   Substance Use Topics    Alcohol use: Yes     Alcohol/week: 1.0 standard drink of alcohol     Types: 1 Standard drinks or equivalent per week     Comment: Socially    Drug use: Never   [6] atenolol, 50 mg, oral, Daily  atorvastatin, 40 mg, oral, Daily  bisacodyl, 10 mg, oral, BID  docusate sodium, 100 mg, oral, BID  donepezil, 10 mg, oral, Nightly  [Held by provider] glipiZIDE, 2.5 mg, oral, Daily  insulin lispro, 0-10 Units, subcutaneous, Before meals & nightly  mirtazapine, 15 mg, oral, Nightly  potassium phosphate, 21 mmol, intravenous, Once  [7] PRN medications: acetaminophen **OR** acetaminophen **OR** acetaminophen, heparin, melatonin  [8]  heparin, 0-4,500 Units/hr, Last Rate: 900 Units/hr (05/07/25 0359)

## 2025-05-07 NOTE — H&P
HPI    Inge Clark is a 80 y.o. female with a PMHx of vascular dementia, dysphagia, HTN/HLD, T2DM (2025 8.4%), who presented to [facility] on 25 with a chief complaint of altered mental status. Unable to reach out to . Per patient, she is not sure why she came to the hospital. States she lives with her , who brought her to the hospital because he was concerned about her condition. Patient is pleasantly demented, couldn't recall her last name/year as well as 's name. Per ED's note, she was found to be mumbling at home, and had not been able to ambulate well at home. Per patient, she has a cane at home, but she walks around okay without it. States she is constipated, but denies any acute complaints. Denies associated HA, fever/chills, nausea/vomiting, chest pain/discomfort, orthopnea/PND, SOB, palpitations, abdominal pain, weakness, changes to weight, appetite, or urination. Denies any falls/trauma, recent illness or sick contacts.     ROS: 10 point review of systems negative with the exception of above.    ED Course:  Vitals: T 101.5F     /68          RR 18     SPO2 92% room air  Labs:  - CBC: mild leukocytosis WBC 11.6 with neutrophilia, Hgb 11.4, MCV 87, platelets 77  - RFP: Na 133, K 2.8, Cr 1.17, GFR 48, Mg 1.27  - LFTs: unremarkable except T Bili 1.3  - Blood glucose: 309  - PT / INR: 16.7/1.5  - Lactate: 3.3->2.0  - Trop: 8  - Lipase: 20  - VB.52/33/26.9/58  - Blood cultures: pending  - UA: glucosuria +, negative for proteinuria/UTI  - COVID/Influenza/RSV negative   - EKG: NSR, no ST or T wave morphologies    Imaging:  - CXR: No acute cardiopulmonary process is evident.   - CT Head wo contrast: Chronic small vessel ischemic disease. No acute intraparenchymal hemorrhage or   parenchymal evidence of acute large territory ischemic infarct.   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 At most this   result in 85% stenosis in the infrarenal cava. Other findings include lumbar compression fractures and right scapula chronic fracture.  - BL LE DVT ultrasound: No DVT. 23 mm left popliteal fossa fluid collection.    Intervention: In ED, patient received Tylenolx1, 1L NS bolus, Vanc/Zosyn, Klor 20mEq, Mg sulfate x1. ED spoke with Dr. Wang who will see the patient tomorrow and recommended to be started on Heparin drip. Patient was then transferred to the floor for further management IVC/infrarenal partially occlusive thrombus.    PMH: mentioned above in HPI  PSH: mentioned above in HPI  FH: noncontributory     Social Hx:  - EtOH: Denies  - Tobacco: Denies  - Illicit Drugs: Denies  - Lives with:     Allergies: as documented in EMR  Meds: as documented in med rec    Past Medical History   Medical History[1]   Surgical History   Surgical History[2]  Family History   Family History[3]  Social History     Tobacco Use: Low Risk  (5/6/2025)    Patient History     Smoking Tobacco Use: Never     Smokeless Tobacco Use: Never     Passive Exposure: Not on file      Social History     Substance and Sexual Activity   Alcohol Use Yes    Alcohol/week: 1.0 standard drink of alcohol    Types: 1 Standard drinks or equivalent per week    Comment: Socially      Allergies   RX Allergies[4]   Meds    Scheduled medications  Scheduled Medications[5]  Continuous medications  Continuous Medications[6]  PRN medications  PRN Medications[7]   Objective     Vitals  Visit Vitals  /74   Pulse 95   Temp 36.5 °C (97.7 °F) (Temporal)   Resp (!) 21   Ht 1.6 m (5' 3\")   Wt 56.2 kg (124 lb)   SpO2 94%   BMI 21.97 kg/m²   OB Status Postmenopausal   Smoking Status Never   BSA 1.58 m²        Physical Examination:  Constitutional: Appears stated age. In NAD.   HEENT: NC/AT, EOMI, clear sclera, moist mucous membranes  CV: RRR, No M/R/G  PULM: CTAB, no coughing or " "wheezing  ABDOMEN: Soft, NT/ND. No TTP. + BSx4.  SKIN: Normal Color, Warm, Dry, No Rashes   EXTREMITIES: Non-Tender, Full ROM, No Pedal Edema  NEURO: A&O x 1, nonfocal neurological exam.  PSYCH: Normal Mood & Behavior    I/Os  No intake or output data in the 24 hours ending 05/06/25 2111    Labs:   Results from last 72 hours   Lab Units 05/06/25  1549   SODIUM mmol/L 133*   POTASSIUM mmol/L 2.8*   CHLORIDE mmol/L 93*   CO2 mmol/L 26   BUN mg/dL 22   CREATININE mg/dL 1.17*   GLUCOSE mg/dL 309*   CALCIUM mg/dL 8.9   ANION GAP mmol/L 17   EGFR mL/min/1.73m*2 48*      Results from last 72 hours   Lab Units 05/06/25  1549   WBC AUTO x10*3/uL 11.6*   HEMOGLOBIN g/dL 11.4*   HEMATOCRIT % 32.6*   PLATELETS AUTO x10*3/uL 77*   NEUTROS PCT AUTO % 87.2   LYMPHS PCT AUTO % 5.9   MONOS PCT AUTO % 5.6   EOS PCT AUTO % 0.8      Lab Results   Component Value Date    CALCIUM 8.9 05/06/2025    PHOS 2.1 (L) 08/13/2022      No results found for: \"CRP\"   [unfilled]     Micro/ID:   No results found for the last 90 days.                   No lab exists for component: \"AGALPCRNB\"   .ID  Lab Results   Component Value Date    URINECULTURE  04/13/2025     Growth indicates contamination with periurethral howard. Repeat culture if clinically indicated.     Images    Vascular US Lower Extremity Venous Duplex Bilateral  Narrative: Interpreted By:  Yemi Zambrano,   STUDY:  [facility] US LOWER EXTREMITY VENOUS DUPLEX BILATERAL;  5/6/2025 7:51 pm      INDICATION:  Signs/Symptoms:bilateral leg pain.          COMPARISON:  None.      ACCESSION NUMBER(S):  [id number]      ORDERING CLINICIAN:  BRIANNA RUIZ      TECHNIQUE:  Vascular ultrasound of the bilateral lower extremities was performed.  Real-time compression views as well as Gray scale, color Doppler and  spectral Doppler waveform analysis was performed.      FINDINGS:  Evaluation of the visualized portions of the bilateral common femoral  vein, proximal, mid, and distal femoral vein, and " popliteal vein were  performed.  Evaluation of the visualized portions of the  posterior  tibial and peroneal veins were also performed.      Limitations:  None      The evaluated veins demonstrate normal compressibility. There is  intact venous flow demonstrating normal respiratory variability and  normal augmentation of flow with calf compression. Therefore, there  is no ultrasonographic evidence for deep vein thrombosis within the  evaluated veins.      Respiratory variation and augmentation to calf pressure was noted.      23 x 8 x 13 mm left posterior fossa fluid collection.      Impression: No sonographic evidence for deep vein thrombosis within the evaluated  veins of the bilateral lower extremity.  23 mm left popliteal fossa fluid collection.      MACRO:  None      Signed by: Yemi Zambrano 5/6/2025 8:01 PM  Dictation workstation:   [id number]  CT chest abdomen pelvis w IV contrast  Narrative: Interpreted By:  Marek Trivedi,   STUDY:  CT CHEST ABDOMEN PELVIS W IV CONTRAST;  5/6/2025 5:37 pm      INDICATION:  Signs/Symptoms:fever hx of advanced dementia no uti.          COMPARISON:  11/20/2022 CT abdomen/pelvis      ACCESSION NUMBER(S):  [id number]      ORDERING CLINICIAN:  BRIANNA RUIZ      TECHNIQUE:  Contiguous axial images of the chest, abdomen, and pelvis were  obtained after the intravenous administration of iodinated contrast.  Coronal and sagittal reformatted images were reconstructed from the  axial data.      FINDINGS:      CT CHEST:      MEDIASTINUM AND LYMPH NODES:  The esophageal wall appears within  normal limits.  No enlarged intrathoracic or axillary lymph nodes by  imaging criteria. No pneumomediastinum.      VESSELS:  Normal caliber thoracic aorta without dissection. No  significant aortic atherosclerosis.      HEART: Normal size.  Moderate coronary artery calcifications. No  significant pericardial effusion.      LUNG, AIRWAYS, PLEURA:  Mild subsegmental atelectasis.  No  consolidation, pulmonary edema, effusion, or pneumothorax.      OSSEOUS STRUCTURES: No acute osseous abnormality. There is a chronic  fracture of the right inferior scapular angle. There is a chronic  fracture of the lateral right 2nd rib, 3rd rib, 4th rib. There is  chronic buckle fracture of the anterior right 5th rib.      CHEST WALL SOFT TISSUES: No discernible acute abnormality.          ABDOMEN/PELVIS:      ABDOMINAL WALL: No significant abnormality.      LIVER: No significant parenchymal abnormality.      BILE DUCTS: No significant intrahepatic or extrahepatic dilatation.      GALLBLADDER: No significant abnormality.      PANCREAS: No significant abnormality.      SPLEEN: No significant abnormality.      ADRENALS: No significant abnormality.      KIDNEYS, URETERS, BLADDER: There are multiple  too-small-to-characterize hypodensities in the kidneys statistically  representing benign cysts. No hydroureteronephrosis or  nephroureterolithiasis. The urinary bladder wall thickness appears  within normal limits for degree of distention.      REPRODUCTIVE ORGANS: No significant abnormality.      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 At most this  result in 85% stenosis in the infrarenal cava.      RETROPERITONEUM/LYMPH NODES: No acute retroperitoneal abnormality. No  enlarged lymph nodes.      BOWEL/PERITONEUM: Moderate amount of stool in the rectum. No  inflammatory bowel wall thickening or dilatation. Normal appendix.      No ascites, free air, or fluid collection.          MUSCULOSKELETAL: Osteopenic bone. Right total hip arthroplasty  appears uncomplicated extending fluid in the study. Chronic  compression fractures of L2 on L3 with 25% height loss are new from  08/11/2022. No suspicious osseous lesion.      Impression: large partially occlusive thrombus  within the IVC measuring  approximately 11 cm in length extending from the iliocaval  bifurcation and extending just above the renal veins. At most this  result in 85% stenosis in the infrarenal cava.      No other acute process in the chest, abdomen, or pelvis.      Chronic compression fractures of L2 and L3 with 25% height loss, not  significantly changed.      Chronic fracture of the inferior angle of the right scapula.      MACRO:  Marek Trivedi discussed the significance and urgency of this  critical finding via [employer] with confirmation of receipt with  BRIANNA RUIZ on 5/6/2025 at 6:21 pm.  (**-RCF-**) Findings:  See  findings.      Signed by: Marek Trivedi 5/6/2025 6:22 PM  Dictation workstation:   [id number]  CT head wo IV contrast  Narrative: Interpreted By:  Marek Trivedi,   STUDY:  CT HEAD WO IV CONTRAST;  5/6/2025 5:37 pm      INDICATION:  Signs/Symptoms:ams.          COMPARISON:  04/13/2025      ACCESSION NUMBER(S):  [id number]      ORDERING CLINICIAN:  BRIANNA RUIZ      TECHNIQUE:  Noncontrast axial CT images of head were obtained with coronal and  sagittal reconstructed images.      FINDINGS:  BRAIN PARENCHYMA: Mild periventricular and subcortical hemispheric  white matter hypodensities are most compatible with chronic small  vessel ischemic disease. No acute intraparenchymal hemorrhage or  parenchymal evidence of acute large territory ischemic infarct.  Gray-white matter distinction is preserved. No mass-effect.      VENTRICLES and EXTRA-AXIAL SPACES:  No acute extra-axial or  intraventricular hemorrhage. No effacement of cerebral sulci. The  ventricles and sulci are age-concordant.      PARANASAL SINUSES/MASTOIDS:  No hemorrhage or air-fluid levels within  the visualized paranasal sinuses. The mastoids are well aerated.      CALVARIUM/ORBITS:  No acute skull fracture.  The orbits and globes  are intact to the extent visualized.      EXTRACRANIAL SOFT TISSUES: No discernible  acute abnormality.      Impression: No acute intracranial abnormality.          MACRO:  None.      Signed by: Marek Trivedi 5/6/2025 6:10 PM  Dictation workstation:   [id number]  XR chest 2 views  Narrative: Interpreted By:  Art Sullivan,   STUDY:  Chest dated  5/6/2025.      INDICATION:  Signs/Symptoms:fever      COMPARISON:  Chest dated 04/17/2025.      ACCESSION NUMBER(S):  [id number]      ORDERING CLINICIAN:  BRIANNA RUIZ      TECHNIQUE:  Two views of the chest.      FINDINGS:  The lungs are clear.  No pneumothorax or effusion is evident. The  cardiomediastinal silhouette is  not enlarged.Degenerative change is  seen of the spine and shoulders.      Impression: No acute cardiopulmonary process is evident.      MACRO:  None      Signed by: Art Sullivan 5/6/2025 4:29 PM  Dictation workstation:   [id number]    Assessment and Plan    Inge Clark is a 80 y.o. female with a PMHx of vascular dementia, dysphagia, HTN/HLD, T2DM (4/2025 8.4%), who presented to [facility] on 5/6/25 with a chief complaint of altered mental status.    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 At most this   result in 85% stenosis in the infrarenal cava.  - Vascular Dr. Wang consulted by ED  - Continue Heparin drip    #Encephalopathy 2/2 metabolic or infectious or underlying dementia  #Vascular dementia  #Hypokalemia and Hyponatremia likely from low PO intake  - Patient is AAOx1. Has poor PO intake at home.   - CT Head: unremarkable except small vessel ischemic changes  - S/P Vanc/zosyn in ED.   - COVID/Influenza/RSV negative. Pending: blood cultures  - Patient was noted to be febrile on admission. However, no signs of infection. Leukocytosis seems reactive. Not continuing antibiotics  for now.   - Continue home meds- Mirtazapine 15mg and Aricept 10mg nightly  - Replete electrolytes as needed    #Thrombocytopenia   #Anemia  - likely nutrition related or other causes like MDS  - No overt signs of bleeding. Pending peripheral smear and [id number]. Continue to monitor.     #AMARI likely pre-renal from low PO intake  - s/p 1L NS bolus in ED  - Encourage oral intake    #Constipation  - Last BM was a week ago  - Dulcolax and colace     Chronic Medical Issues   #HTN/HLD: continue atenolol 50mg and atorvastatin 40mg qhs  #T2DM: on glipizide 2.5mg at home. Currently on SSI.  #Dysphagia: on modified diet. Aspiration precautions.     F: PO intake & IVF PRN   E: Replete as needed  N: consistent Carb ; CCD 60gm/meal; soft&bite sized 6 ; 1:1 feeding  GI ppx: none  DVT ppx: heparin drip  Antibiotics: vanc/zosyn (5/6)  Oxygenation: room air    Code Status: Full Code   Emergency Contact: Extended Emergency Contact Information  Primary Emergency Contact: Deacon Clark  Home Phone: [phone]  Relation: Spouse  Secondary Emergency Contact: AmberAdriel  Mobile Phone: [phone]  Relation: Son  Preferred language: English   needed? No     Disposition: 80 y.o.female admitted for IVC/infrarenal thrombus. On heparin drip. Vascular consulted.  Anticipate LOS > 48 hrs.          [1]   Past Medical History:  Diagnosis Date    Elevated troponin I level 08/15/2024    Essential (primary) hypertension 12/29/2022    HTN (hypertension), benign    Insect bite (nonvenomous) of scalp, initial encounter (CODE) 06/05/2017    Tick bite of scalp, initial encounter    Listeriosis, unspecified 05/23/2016    Listeria infection    Other conditions influencing health status 01/29/2018    Normal colonoscopy    Pyelonephritis of left kidney 04/11/2023    Type 2 diabetes mellitus with other specified complication 12/29/2022    DM type 2 with diabetic mixed hyperlipidemia    Unilateral primary osteoarthritis, right hip 12/09/2019     Primary osteoarthritis of right hip    Vitamin D deficiency, unspecified     Vitamin D deficiency   [2]   Past Surgical History:  Procedure Laterality Date    OTHER SURGICAL HISTORY  09/30/2019    Tonsillectomy with adenoidectomy   [3]   Family History  Problem Relation Name Age of Onset    Stroke Mother      Heart attack Mother      Hypertension Father     [4]   Allergies  Allergen Reactions    Grass Pollen Unknown   [5] bisacodyl, 10 mg, oral, BID  potassium chloride, 20 mEq, intravenous, q2h  [6] heparin, 0-4,500 Units/hr, Last Rate: 1,000 Units/hr (05/06/25 1852)  [7] PRN medications: acetaminophen **OR** acetaminophen **OR** acetaminophen, heparin

## 2025-05-07 NOTE — PROGRESS NOTES
05/07/25 1425   Discharge Planning   Living Arrangements Spouse/significant other   Support Systems Spouse/significant other;Children   Assistance Needed Baseline A&Ox1, assistance with ADLs, utilizes a walker. [facility] with [facility](SN/PT). Spouse drives. Room air at baseline. PCP Maryann Marinelli APRN-CNS   Type of Residence Private residence   Number of Stairs to Enter Residence 3   Number of Stairs Within Residence 0   Do you have animals or pets at home? No   Home or Post Acute Services In home services   Type of Home Care Services Home PT;Home nursing visits   Expected Discharge Disposition Home Health  (PT/OT evals pending. Active with Critical access hospital)   Does the patient need discharge transport arranged? No   Patient Choice   Provider Choice list and CMS website (https://medicare.gov/care-compare#search) for post-acute Quality and Resource Measure Data were provided and reviewed with: Family   Patient / Family choosing to utilize agency / facility established prior to hospitalization Yes   Stroke Family Assessment   Stroke Family Assessment Needed No   Intensity of Service   Intensity of Service 0-30 min

## 2025-05-07 NOTE — CARE PLAN
The patient's goals for the shift include  use call light for assistance    The clinical goals for the shift include maintain patient safety

## 2025-05-08 ENCOUNTER — TELEPHONE (OUTPATIENT)
Dept: PRIMARY CARE | Facility: CLINIC | Age: 80
End: 2025-05-08
Payer: MEDICARE

## 2025-05-08 DIAGNOSIS — R63.4 WEIGHT LOSS, UNINTENTIONAL: ICD-10-CM

## 2025-05-08 LAB
ALBUMIN SERPL BCP-MCNC: 3.1 G/DL (ref 3.4–5)
ALP SERPL-CCNC: 83 U/L (ref 33–136)
ALT SERPL W P-5'-P-CCNC: 33 U/L (ref 7–45)
ANION GAP SERPL CALC-SCNC: 15 MMOL/L (ref 10–20)
AST SERPL W P-5'-P-CCNC: 31 U/L (ref 9–39)
BASOPHILS # BLD AUTO: 0.02 X10*3/UL (ref 0–0.1)
BASOPHILS NFR BLD AUTO: 0.3 %
BILIRUB SERPL-MCNC: 0.7 MG/DL (ref 0–1.2)
BUN SERPL-MCNC: 8 MG/DL (ref 6–23)
CALCIUM SERPL-MCNC: 8.4 MG/DL (ref 8.6–10.3)
CHLORIDE SERPL-SCNC: 95 MMOL/L (ref 98–107)
CO2 SERPL-SCNC: 27 MMOL/L (ref 21–32)
CREAT SERPL-MCNC: 0.68 MG/DL (ref 0.5–1.05)
EGFRCR SERPLBLD CKD-EPI 2021: 89 ML/MIN/1.73M*2
EOSINOPHIL # BLD AUTO: 0.14 X10*3/UL (ref 0–0.4)
EOSINOPHIL NFR BLD AUTO: 1.9 %
ERYTHROCYTE [DISTWIDTH] IN BLOOD BY AUTOMATED COUNT: 13.7 % (ref 11.5–14.5)
GLUCOSE BLD MANUAL STRIP-MCNC: 182 MG/DL (ref 74–99)
GLUCOSE BLD MANUAL STRIP-MCNC: 184 MG/DL (ref 74–99)
GLUCOSE BLD MANUAL STRIP-MCNC: 284 MG/DL (ref 74–99)
GLUCOSE SERPL-MCNC: 192 MG/DL (ref 74–99)
HCT VFR BLD AUTO: 32.6 % (ref 36–46)
HGB BLD-MCNC: 10.9 G/DL (ref 12–16)
IMM GRANULOCYTES # BLD AUTO: 0.03 X10*3/UL (ref 0–0.5)
IMM GRANULOCYTES NFR BLD AUTO: 0.4 % (ref 0–0.9)
INR PPP: 1.3 (ref 0.9–1.1)
LYMPHOCYTES # BLD AUTO: 1.65 X10*3/UL (ref 0.8–3)
LYMPHOCYTES NFR BLD AUTO: 22.9 %
MAGNESIUM SERPL-MCNC: 1.54 MG/DL (ref 1.6–2.4)
MCH RBC QN AUTO: 30 PG (ref 26–34)
MCHC RBC AUTO-ENTMCNC: 33.4 G/DL (ref 32–36)
MCV RBC AUTO: 90 FL (ref 80–100)
MONOCYTES # BLD AUTO: 0.42 X10*3/UL (ref 0.05–0.8)
MONOCYTES NFR BLD AUTO: 5.8 %
NEUTROPHILS # BLD AUTO: 4.94 X10*3/UL (ref 1.6–5.5)
NEUTROPHILS NFR BLD AUTO: 68.7 %
NRBC BLD-RTO: 0 /100 WBCS (ref 0–0)
PHOSPHATE SERPL-MCNC: 3.3 MG/DL (ref 2.5–4.9)
PLATELET # BLD AUTO: 102 X10*3/UL (ref 150–450)
POTASSIUM SERPL-SCNC: 2.9 MMOL/L (ref 3.5–5.3)
PROT SERPL-MCNC: 6.5 G/DL (ref 6.4–8.2)
PROTHROMBIN TIME: 14.9 SECONDS (ref 9.8–12.4)
RBC # BLD AUTO: 3.63 X10*6/UL (ref 4–5.2)
SODIUM SERPL-SCNC: 134 MMOL/L (ref 136–145)
UFH PPP CHRO-ACNC: 0.3 IU/ML (ref ?–1.1)
WBC # BLD AUTO: 7.2 X10*3/UL (ref 4.4–11.3)

## 2025-05-08 PROCEDURE — 83735 ASSAY OF MAGNESIUM: CPT

## 2025-05-08 PROCEDURE — 99232 SBSQ HOSP IP/OBS MODERATE 35: CPT | Performed by: BEHAVIOR TECHNICIAN

## 2025-05-08 PROCEDURE — 2500000002 HC RX 250 W HCPCS SELF ADMINISTERED DRUGS (ALT 637 FOR MEDICARE OP, ALT 636 FOR OP/ED)

## 2025-05-08 PROCEDURE — 85610 PROTHROMBIN TIME: CPT

## 2025-05-08 PROCEDURE — 2500000001 HC RX 250 WO HCPCS SELF ADMINISTERED DRUGS (ALT 637 FOR MEDICARE OP)

## 2025-05-08 PROCEDURE — 85520 HEPARIN ASSAY: CPT | Performed by: INTERNAL MEDICINE

## 2025-05-08 PROCEDURE — 84100 ASSAY OF PHOSPHORUS: CPT

## 2025-05-08 PROCEDURE — 1200000002 HC GENERAL ROOM WITH TELEMETRY DAILY

## 2025-05-08 PROCEDURE — 85025 COMPLETE CBC W/AUTO DIFF WBC: CPT

## 2025-05-08 PROCEDURE — 36415 COLL VENOUS BLD VENIPUNCTURE: CPT

## 2025-05-08 PROCEDURE — 2500000004 HC RX 250 GENERAL PHARMACY W/ HCPCS (ALT 636 FOR OP/ED): Mod: JZ | Performed by: BEHAVIOR TECHNICIAN

## 2025-05-08 PROCEDURE — 94760 N-INVAS EAR/PLS OXIMETRY 1: CPT

## 2025-05-08 PROCEDURE — 2500000001 HC RX 250 WO HCPCS SELF ADMINISTERED DRUGS (ALT 637 FOR MEDICARE OP): Performed by: BEHAVIOR TECHNICIAN

## 2025-05-08 PROCEDURE — 80053 COMPREHEN METABOLIC PANEL: CPT

## 2025-05-08 PROCEDURE — 2500000002 HC RX 250 W HCPCS SELF ADMINISTERED DRUGS (ALT 637 FOR MEDICARE OP, ALT 636 FOR OP/ED): Performed by: BEHAVIOR TECHNICIAN

## 2025-05-08 PROCEDURE — 82947 ASSAY GLUCOSE BLOOD QUANT: CPT

## 2025-05-08 RX ORDER — ENOXAPARIN SODIUM 100 MG/ML
1.5 INJECTION SUBCUTANEOUS EVERY 24 HOURS
Status: DISCONTINUED | OUTPATIENT
Start: 2025-05-08 | End: 2025-05-12 | Stop reason: HOSPADM

## 2025-05-08 RX ORDER — INSULIN LISPRO 100 [IU]/ML
0-15 INJECTION, SOLUTION INTRAVENOUS; SUBCUTANEOUS
Status: DISCONTINUED | OUTPATIENT
Start: 2025-05-08 | End: 2025-05-10

## 2025-05-08 RX ORDER — POTASSIUM CHLORIDE 20 MEQ/1
20 TABLET, EXTENDED RELEASE ORAL ONCE
Status: DISCONTINUED | OUTPATIENT
Start: 2025-05-08 | End: 2025-05-08

## 2025-05-08 RX ORDER — POTASSIUM CHLORIDE 20 MEQ/1
20 TABLET, EXTENDED RELEASE ORAL DAILY
Status: DISCONTINUED | OUTPATIENT
Start: 2025-05-09 | End: 2025-05-12 | Stop reason: HOSPADM

## 2025-05-08 RX ORDER — WARFARIN 3 MG/1
3 TABLET ORAL DAILY
Status: DISCONTINUED | OUTPATIENT
Start: 2025-05-08 | End: 2025-05-10

## 2025-05-08 RX ORDER — POTASSIUM CHLORIDE 1.5 G/1.58G
40 POWDER, FOR SOLUTION ORAL ONCE
Status: COMPLETED | OUTPATIENT
Start: 2025-05-08 | End: 2025-05-08

## 2025-05-08 RX ORDER — MAGNESIUM SULFATE HEPTAHYDRATE 40 MG/ML
2 INJECTION, SOLUTION INTRAVENOUS ONCE
Status: COMPLETED | OUTPATIENT
Start: 2025-05-08 | End: 2025-05-08

## 2025-05-08 RX ORDER — MIRTAZAPINE 15 MG/1
15 TABLET, FILM COATED ORAL NIGHTLY
Qty: 90 TABLET | Refills: 3 | Status: SHIPPED | OUTPATIENT
Start: 2025-05-08 | End: 2026-05-08

## 2025-05-08 RX ORDER — POTASSIUM CHLORIDE 14.9 MG/ML
20 INJECTION INTRAVENOUS
Status: COMPLETED | OUTPATIENT
Start: 2025-05-08 | End: 2025-05-08

## 2025-05-08 RX ADMIN — ATORVASTATIN CALCIUM 40 MG: 40 TABLET, FILM COATED ORAL at 09:18

## 2025-05-08 RX ADMIN — MIRTAZAPINE 15 MG: 15 TABLET, FILM COATED ORAL at 21:05

## 2025-05-08 RX ADMIN — MAGNESIUM SULFATE HEPTAHYDRATE 2 G: 2 INJECTION, SOLUTION INTRAVENOUS at 09:17

## 2025-05-08 RX ADMIN — WARFARIN SODIUM 3 MG: 3 TABLET ORAL at 18:30

## 2025-05-08 RX ADMIN — DONEPEZIL HYDROCHLORIDE 10 MG: 5 TABLET ORAL at 21:05

## 2025-05-08 RX ADMIN — POTASSIUM CHLORIDE 20 MEQ: 14.9 INJECTION, SOLUTION INTRAVENOUS at 09:18

## 2025-05-08 RX ADMIN — ENOXAPARIN SODIUM 80 MG: 80 INJECTION SUBCUTANEOUS at 13:34

## 2025-05-08 RX ADMIN — POTASSIUM CHLORIDE 20 MEQ: 14.9 INJECTION, SOLUTION INTRAVENOUS at 10:15

## 2025-05-08 RX ADMIN — INSULIN LISPRO 3 UNITS: 100 INJECTION, SOLUTION INTRAVENOUS; SUBCUTANEOUS at 17:15

## 2025-05-08 RX ADMIN — ATENOLOL 50 MG: 50 TABLET ORAL at 09:18

## 2025-05-08 RX ADMIN — INSULIN LISPRO 2 UNITS: 100 INJECTION, SOLUTION INTRAVENOUS; SUBCUTANEOUS at 09:18

## 2025-05-08 RX ADMIN — INSULIN LISPRO 6 UNITS: 100 INJECTION, SOLUTION INTRAVENOUS; SUBCUTANEOUS at 12:13

## 2025-05-08 RX ADMIN — POTASSIUM CHLORIDE 40 MEQ: 1.5 POWDER, FOR SOLUTION ORAL at 09:18

## 2025-05-08 ASSESSMENT — COGNITIVE AND FUNCTIONAL STATUS - GENERAL
MOVING FROM LYING ON BACK TO SITTING ON SIDE OF FLAT BED WITH BEDRAILS: A LITTLE
CLIMB 3 TO 5 STEPS WITH RAILING: TOTAL
WALKING IN HOSPITAL ROOM: A LOT
EATING MEALS: A LITTLE
DRESSING REGULAR UPPER BODY CLOTHING: A LOT
TURNING FROM BACK TO SIDE WHILE IN FLAT BAD: A LITTLE
MOVING TO AND FROM BED TO CHAIR: A LOT
STANDING UP FROM CHAIR USING ARMS: A LOT
MOBILITY SCORE: 13
PERSONAL GROOMING: A LITTLE
HELP NEEDED FOR BATHING: A LOT
DRESSING REGULAR LOWER BODY CLOTHING: A LOT
TOILETING: A LOT
DAILY ACTIVITIY SCORE: 14

## 2025-05-08 ASSESSMENT — PAIN SCALES - GENERAL: PAINLEVEL_OUTOF10: 0 - NO PAIN

## 2025-05-08 NOTE — PROGRESS NOTES
Patient: Inge Clark   Age: 80 y.o.   Gender: female   Room/Bed: 127/127-A     Attending: Justin Casillas DO  Code Status:  Full Code  Admitted Date: 5/6/2025  3:36 PM    Chief Complaint:  Patient: Inge Clark is a 80 y.o. female with PMHX vascular dementia, dysphagia, HTN/HLD, T2DM (4/2025 8.4%) admitted for acute metabolic encephalopathy 2/2 poor intake and incidental infrarenal thrombus.    OVERNIGHT: No significant events reported.      SUBJECTIVE:  Patient was alert and denies chest pain, palpitations, lightheadedness, and trouble eating. Patient's  arrived at bedside and expressed the desire to receive PT/OT to regain strength. They expressed understanding regarding the need for anticoagulation and patient's  indicated preference that she stay in the hospital while bridging occurs.            ALLERGIES PAST MEDICAL HISTORY PAST SURGICAL SURGERY FAMILY HISTORY SOCIAL HISTORY   Allergies[1] Medical History[2] Surgical History[3] Family History[4] Social History[5]           MEDS:      HOME MEDS SCHEDULED MEDS PRN IV MEDS   Current Outpatient Medications   Medication Instructions    atenolol (TENORMIN) 50 mg, oral, Daily    atorvastatin (LIPITOR) 40 mg, oral, Daily    blood sugar diagnostic (OneTouch Ultra Test) TEST ONCE DAILY    blood-glucose meter,continuous misc Use as instructed    donepezil (ARICEPT) 10 mg, oral, Nightly    FreeStyle Elsie 14 Day Sensor kit Use as instructed    glipiZIDE 2.5 mg, oral, Daily    mirtazapine (REMERON) 15 mg, oral, Nightly    potassium chloride CR (Klor-Con M20) 20 mEq ER tablet 20 mEq, oral, Daily, Do not crush or chew.    Scheduled Medications[6] PRN Medications[7] Continuous Medications[8]               OBJECTIVE:  Physical Exam  Constitutional:       Appearance: Normal appearance.   Cardiovascular:      Rate and Rhythm: Normal rate and regular rhythm.      Pulses: Normal pulses.      Heart sounds: Normal heart sounds.   Pulmonary:      Effort:  Pulmonary effort is normal.      Breath sounds: Normal breath sounds.   Abdominal:      Palpations: Abdomen is soft.      Tenderness: There is no abdominal tenderness. There is no guarding or rebound.   Musculoskeletal:      Right lower leg: No edema.      Left lower leg: No edema.   Skin:     General: Skin is warm and dry.   Neurological:      General: No focal deficit present.      Mental Status: She is alert. Mental status is at baseline.   Psychiatric:         Mood and Affect: Mood normal.         Behavior: Behavior normal.                  VITALS:  Vitals:    05/07/25 1950 05/08/25 0355 05/08/25 0735 05/08/25 1148   BP:  113/61 132/61 114/62   BP Location:  Right arm Left arm    Patient Position:  Lying     Pulse: 107 66 78 81   Resp: 18 16 20    Temp:  37 °C (98.6 °F) 36.6 °C (97.9 °F) 36.9 °C (98.4 °F)   TempSrc:  Temporal Temporal    SpO2: 98% 95% 99% 98%   Weight:       Height:               Intake/Output Summary (Last 24 hours) at 5/8/2025 1530  Last data filed at 5/8/2025 1206  Gross per 24 hour   Intake 760 ml   Output 1000 ml   Net -240 ml         LABS:     CBC:  Results from last 72 hours   Lab Units 05/08/25  0610 05/07/25  0325 05/06/25  1549   WBC AUTO x10*3/uL 7.2 9.1 11.6*   RBC AUTO x10*6/uL 3.63* 3.59* 3.74*   HEMOGLOBIN g/dL 10.9* 10.8* 11.4*   HEMATOCRIT % 32.6* 31.5* 32.6*   MCV fL 90 88 87   MCH pg 30.0 30.1 30.5   MCHC g/dL 33.4 34.3 35.0   RDW % 13.7 13.7 13.7   PLATELETS AUTO x10*3/uL 102* 84* 77*     CMP:  Results from last 72 hours   Lab Units 05/08/25  0610 05/07/25  0325 05/06/25  1549   SODIUM mmol/L 134* 134* 133*   POTASSIUM mmol/L 2.9* 2.9* 2.8*   CHLORIDE mmol/L 95* 96* 93*   CO2 mmol/L 27 26 26   ANION GAP mmol/L 15 15 17   BUN mg/dL 8 16 22   CREATININE mg/dL 0.68 0.81 1.17*   EGFR mL/min/1.73m*2 89 74 48*   GLUCOSE mg/dL 192* 194* 309*     Results from last 72 hours   Lab Units 05/08/25  0610 05/07/25  0325 05/06/25  1549   ALBUMIN g/dL 3.1* 3.3* 3.8   ALK PHOS U/L 83 86 93  "  ALT U/L 33 26 28   AST U/L 31 25 22   BILIRUBIN TOTAL mg/dL 0.7 1.1 1.3*   LIPASE U/L  --   --  20     Calcium/Phos:   Results from last 72 hours   Lab Units 05/08/25  0610 05/07/25  0325 05/06/25  1549   CALCIUM mg/dL 8.4* 8.6 8.9   PHOSPHORUS mg/dL 3.3 1.8*  --       COAG:   Results from last 72 hours   Lab Units 05/08/25  0610 05/07/25  0325 05/06/25  1845   INR  1.3* 1.5* 1.5*     CRP: No results found for: \"CRP\"    ENDO:  Recent Labs     04/26/25  0715 04/25/25  0510 01/28/25  0000 03/29/24  1600   TSH  --   --  1.24 1.12   HGBA1C 8.4* 8.3* 7.7* 8.0*      CARDIAC:   Results from last 72 hours   Lab Units 05/06/25  1716 05/06/25  1549   TROPHS ng/L 8 8       No data recorded    MICRO/ID:   No results found for the last 90 days.      Lab Results   Component Value Date    URINECULTURE  04/13/2025     Growth indicates contamination with periurethral howard. Repeat culture if clinically indicated.    BLOODCULT No growth at 1 day 05/06/2025    BLOODCULT No growth at 1 day 05/06/2025         NUTRITION STATUS:           IMAGING:     Imaging  Vascular US Lower Extremity Venous Duplex Bilateral  Result Date: 5/6/2025  No sonographic evidence for deep vein thrombosis within the evaluated veins of the bilateral lower extremity. 23 mm left popliteal fossa fluid collection.   MACRO: None   Signed by: Yemi Zambrano 5/6/2025 8:01 PM Dictation workstation:   [id number]    CT chest abdomen pelvis w IV contrast  Result Date: 5/6/2025  large partially occlusive thrombus within the IVC measuring approximately 11 cm in length extending from the iliocaval bifurcation and extending just above the renal veins. At most this result in 85% stenosis in the infrarenal cava.   No other acute process in the chest, abdomen, or pelvis.   Chronic compression fractures of L2 and L3 with 25% height loss, not significantly changed.   Chronic fracture of the inferior angle of the right scapula.   MACRO: Marek Trivedi discussed the " significance and urgency of this critical finding via [employer] with confirmation of receipt with BRIANNA RUIZ on 5/6/2025 at 6:21 pm.  (**-RCF-**) Findings:  See findings.   Signed by: Marek Trivedi 5/6/2025 6:22 PM Dictation workstation:   [id number]    CT head wo IV contrast  Result Date: 5/6/2025  No acute intracranial abnormality.     MACRO: None.   Signed by: Marek Trivedi 5/6/2025 6:10 PM Dictation workstation:   [id number]    XR chest 2 views  Result Date: 5/6/2025  No acute cardiopulmonary process is evident.   MACRO: None   Signed by: Art Sullivan 5/6/2025 4:29 PM Dictation workstation:   [id number]      Cardiology, Vascular, and Other Imaging  No other imaging results found for the past 2 days           ASSESSMENT & PLAN  Inge Clark is a 80 y.o. female with PMHX vascular dementia, dysphagia, HTN/HLD, T2DM (4/2025 8.4%) admitted for acute metabolic encephalopathy 2/2 poor intake and incidental infrarenal thrombus.    ACUTE MEDICAL ISSUES  #IVC/infrarenal thrombus   ::Incidental finding  - likely from sedentary lifestyle   - CT PE: large partially occlusive thrombus within the IVC measuring approximately 11 cm in length extending from the iliocaval bifurcation and extending just above the renal veins. At most this   result in 85% stenosis in the infrarenal cava.  - Vascular Dr. Wang consulted by ED  Plan:  - Stopped heparin gtt, started therapeutic lovenox and Warfarin 3 mg   - [facility] recommended anticoagulation and to follow up outpatient with US     #Vascular dementia  #Failure to thrive  #Hypokalemia and Hyponatremia likely from low PO intake - improved  - Patient is AAOx1. Has poor PO intake at home.   - CT Head: unremarkable except small vessel ischemic changes  - S/P Vanc/zosyn in ED.   - COVID/Influenza/RSV negative. Pending: blood cultures  Plan:  - Continue home meds- Mirtazapine 15mg and Aricept 10mg nightly  - Replete electrolytes as needed  -  Palliative consulted for vascular dementia, recommending hospice due to failure to thrive and high risk of rehospitalization.   - Nutrition consulted for poor oral intake, recommending Mighty Shake BID and Magic cup daily     #Thrombocytopenia - improving  #Anemia  - likely nutrition related or other causes like MDS  - No overt signs of bleeding.   Plan:  - Peripheral smear showing mild RBC polychromasia and pending [id number].   - Continue to monitor.         RESOLVED MEDICAL ISSUES:  #Metabolic encephalopathy 2/2 electrolyte imbalances vs worsening of underlying dementia   #Constipation  #AMARI likely pre-renal from low PO intake  #Fever   #Leukocytosis     CHRONIC MEDICAL ISSUES:  #HTN/HLD: continue atenolol 50mg and atorvastatin 40mg qhs  #T2DM: on glipizide 2.5mg at home. Currently on SSI.  #Dysphagia: on modified diet. Aspiration precautions. SLP evaluated - no dysphagia noted.         Fluids: PRN  Electrolytes: Replete PRN  Nutrition:Soft diet, 1:1 feeding and carb diet  GI Prophylaxis: None  DVT Prophylaxis: Heparin gtt, Reason: Possible IVC placement  BM: Last BM Date: 05/07/25    Access: PIV  Antibiotics: None, Vanc/Zosyn (5/6)  Oxygenation: RA    Emergency Contact: Extended Emergency Contact Information  Primary Emergency Contact: Deacon Clark  Home Phone: [phone]  Relation: Spouse  Secondary Emergency Contact: Adriel Clark  Mobile Phone: [phone]  Relation: Son  Preferred language: English   needed? No    Dispo: TBA pending discussion with family regarding pursuing hospice.         Juanita Tenorio,   Internal Medicine, PGY-1         [1]   Allergies  Allergen Reactions    Grass Pollen Unknown   [2]   Past Medical History:  Diagnosis Date    Elevated troponin I level 08/15/2024    Essential (primary) hypertension 12/29/2022    HTN (hypertension), benign    Insect bite (nonvenomous) of scalp, initial encounter (CODE) 06/05/2017    Tick bite of scalp, initial encounter    Listeriosis,  unspecified 05/23/2016    Listeria infection    Other conditions influencing health status 01/29/2018    Normal colonoscopy    Pyelonephritis of left kidney 04/11/2023    Type 2 diabetes mellitus with other specified complication 12/29/2022    DM type 2 with diabetic mixed hyperlipidemia    Unilateral primary osteoarthritis, right hip 12/09/2019    Primary osteoarthritis of right hip    Vitamin D deficiency, unspecified     Vitamin D deficiency   [3]   Past Surgical History:  Procedure Laterality Date    OTHER SURGICAL HISTORY  09/30/2019    Tonsillectomy with adenoidectomy   [4]   Family History  Problem Relation Name Age of Onset    Stroke Mother      Heart attack Mother      Hypertension Father     [5]   Social History  Tobacco Use    Smoking status: Never    Smokeless tobacco: Never   Vaping Use    Vaping status: Never Used   Substance Use Topics    Alcohol use: Yes     Alcohol/week: 1.0 standard drink of alcohol     Types: 1 Standard drinks or equivalent per week     Comment: Socially    Drug use: Never   [6] atenolol, 50 mg, oral, Daily  atorvastatin, 40 mg, oral, Daily  docusate sodium, 100 mg, oral, Daily  donepezil, 10 mg, oral, Nightly  enoxaparin, 1.5 mg/kg, subcutaneous, q24h  [Held by provider] glipiZIDE, 2.5 mg, oral, Daily  insulin lispro, 0-15 Units, subcutaneous, TID AC  mirtazapine, 15 mg, oral, Nightly  [START ON 5/9/2025] potassium chloride CR, 20 mEq, oral, Daily  warfarin, 3 mg, oral, Daily     [7] PRN medications: acetaminophen **OR** acetaminophen **OR** acetaminophen, bisacodyl, melatonin  [8]

## 2025-05-08 NOTE — CARE PLAN
The patient's goals for the shift include  use call light for assistance     The clinical goals for the shift include pt will remain safe

## 2025-05-08 NOTE — SIGNIFICANT EVENT
Planned to see patient this AM, however,  primary team still trying to clarify the consult reason and needs in this case.   Discussed with Dr. Tenorio ( primary team)  about condition who  felt their team felt the patient is not in a state of decline.   This provider did provide opinion that the pt is in a state of decline given dementia with poor po intake that led to dehydration and electrolyte imbalance and this hospitalization.     It appears that pt has responded to IV fluids, however, will only provide temporary and put patient at high risk of readmission and stress for family given they will not know where pt is in the course of her life and no ACP in place. Recc to consider offering prognostic information and other option such as hospice in this case for family to choose from that may better meet their needs.    Primary team planning to discuss with family and make determination fro there on if palliative consultation is still being sought.     05/08/25 at 11:36 AM - PRAVIN Sequeira-CNP

## 2025-05-09 ENCOUNTER — APPOINTMENT (OUTPATIENT)
Dept: PRIMARY CARE | Facility: CLINIC | Age: 80
End: 2025-05-09
Payer: MEDICARE

## 2025-05-09 PROBLEM — R62.7 ADULT FAILURE TO THRIVE: Status: ACTIVE | Noted: 2025-05-09

## 2025-05-09 PROBLEM — G93.41 METABOLIC ENCEPHALOPATHY: Status: RESOLVED | Noted: 2025-05-06 | Resolved: 2025-05-09

## 2025-05-09 LAB
ALBUMIN SERPL BCP-MCNC: 3.2 G/DL (ref 3.4–5)
ALP SERPL-CCNC: 91 U/L (ref 33–136)
ALT SERPL W P-5'-P-CCNC: 33 U/L (ref 7–45)
ANION GAP SERPL CALC-SCNC: 15 MMOL/L (ref 10–20)
AST SERPL W P-5'-P-CCNC: 24 U/L (ref 9–39)
ATRIAL RATE: 92 BPM
BASOPHILS # BLD AUTO: 0.02 X10*3/UL (ref 0–0.1)
BASOPHILS NFR BLD AUTO: 0.3 %
BILIRUB SERPL-MCNC: 0.6 MG/DL (ref 0–1.2)
BUN SERPL-MCNC: 9 MG/DL (ref 6–23)
CALCIUM SERPL-MCNC: 8.8 MG/DL (ref 8.6–10.3)
CHLORIDE SERPL-SCNC: 97 MMOL/L (ref 98–107)
CO2 SERPL-SCNC: 26 MMOL/L (ref 21–32)
CREAT SERPL-MCNC: 0.61 MG/DL (ref 0.5–1.05)
EGFRCR SERPLBLD CKD-EPI 2021: >90 ML/MIN/1.73M*2
EOSINOPHIL # BLD AUTO: 0.21 X10*3/UL (ref 0–0.4)
EOSINOPHIL NFR BLD AUTO: 3 %
ERYTHROCYTE [DISTWIDTH] IN BLOOD BY AUTOMATED COUNT: 13.7 % (ref 11.5–14.5)
GLUCOSE BLD MANUAL STRIP-MCNC: 182 MG/DL (ref 74–99)
GLUCOSE BLD MANUAL STRIP-MCNC: 200 MG/DL (ref 74–99)
GLUCOSE BLD MANUAL STRIP-MCNC: 200 MG/DL (ref 74–99)
GLUCOSE BLD MANUAL STRIP-MCNC: 236 MG/DL (ref 74–99)
GLUCOSE SERPL-MCNC: 188 MG/DL (ref 74–99)
HCT VFR BLD AUTO: 30.9 % (ref 36–46)
HGB BLD-MCNC: 11 G/DL (ref 12–16)
IMM GRANULOCYTES # BLD AUTO: 0.03 X10*3/UL (ref 0–0.5)
IMM GRANULOCYTES NFR BLD AUTO: 0.4 % (ref 0–0.9)
INR PPP: 1.3 (ref 0.9–1.1)
LYMPHOCYTES # BLD AUTO: 1.6 X10*3/UL (ref 0.8–3)
LYMPHOCYTES NFR BLD AUTO: 22.9 %
MAGNESIUM SERPL-MCNC: 1.61 MG/DL (ref 1.6–2.4)
MCH RBC QN AUTO: 29.8 PG (ref 26–34)
MCHC RBC AUTO-ENTMCNC: 35.6 G/DL (ref 32–36)
MCV RBC AUTO: 84 FL (ref 80–100)
MONOCYTES # BLD AUTO: 0.37 X10*3/UL (ref 0.05–0.8)
MONOCYTES NFR BLD AUTO: 5.3 %
NEUTROPHILS # BLD AUTO: 4.75 X10*3/UL (ref 1.6–5.5)
NEUTROPHILS NFR BLD AUTO: 68.1 %
NRBC BLD-RTO: 0 /100 WBCS (ref 0–0)
P AXIS: 19 DEGREES
P OFFSET: 187 MS
P ONSET: 143 MS
PHOSPHATE SERPL-MCNC: 3.2 MG/DL (ref 2.5–4.9)
PLATELET # BLD AUTO: 106 X10*3/UL (ref 150–450)
POTASSIUM SERPL-SCNC: 4.1 MMOL/L (ref 3.5–5.3)
PR INTERVAL: 144 MS
PROT SERPL-MCNC: 6.6 G/DL (ref 6.4–8.2)
PROTHROMBIN TIME: 14.1 SECONDS (ref 9.8–12.4)
Q ONSET: 215 MS
QRS COUNT: 15 BEATS
QRS DURATION: 82 MS
QT INTERVAL: 380 MS
QTC CALCULATION(BAZETT): 469 MS
QTC FREDERICIA: 438 MS
R AXIS: 51 DEGREES
RBC # BLD AUTO: 3.69 X10*6/UL (ref 4–5.2)
SCAN RESULT: NORMAL
SODIUM SERPL-SCNC: 134 MMOL/L (ref 136–145)
T AXIS: 25 DEGREES
T OFFSET: 405 MS
UFH PPP CHRO-ACNC: 0.1 IU/ML (ref ?–1.1)
VENTRICULAR RATE: 92 BPM
VWF CP ACT/NOR PPP CHRO: 105 %
WBC # BLD AUTO: 7 X10*3/UL (ref 4.4–11.3)

## 2025-05-09 PROCEDURE — 2500000004 HC RX 250 GENERAL PHARMACY W/ HCPCS (ALT 636 FOR OP/ED): Mod: JZ | Performed by: BEHAVIOR TECHNICIAN

## 2025-05-09 PROCEDURE — 99233 SBSQ HOSP IP/OBS HIGH 50: CPT | Performed by: BEHAVIOR TECHNICIAN

## 2025-05-09 PROCEDURE — 80053 COMPREHEN METABOLIC PANEL: CPT

## 2025-05-09 PROCEDURE — 2500000001 HC RX 250 WO HCPCS SELF ADMINISTERED DRUGS (ALT 637 FOR MEDICARE OP)

## 2025-05-09 PROCEDURE — 2500000001 HC RX 250 WO HCPCS SELF ADMINISTERED DRUGS (ALT 637 FOR MEDICARE OP): Performed by: BEHAVIOR TECHNICIAN

## 2025-05-09 PROCEDURE — 85025 COMPLETE CBC W/AUTO DIFF WBC: CPT

## 2025-05-09 PROCEDURE — 83735 ASSAY OF MAGNESIUM: CPT

## 2025-05-09 PROCEDURE — 2500000002 HC RX 250 W HCPCS SELF ADMINISTERED DRUGS (ALT 637 FOR MEDICARE OP, ALT 636 FOR OP/ED)

## 2025-05-09 PROCEDURE — 2500000002 HC RX 250 W HCPCS SELF ADMINISTERED DRUGS (ALT 637 FOR MEDICARE OP, ALT 636 FOR OP/ED): Performed by: BEHAVIOR TECHNICIAN

## 2025-05-09 PROCEDURE — 82947 ASSAY GLUCOSE BLOOD QUANT: CPT

## 2025-05-09 PROCEDURE — 85610 PROTHROMBIN TIME: CPT

## 2025-05-09 PROCEDURE — 97530 THERAPEUTIC ACTIVITIES: CPT | Mod: GP

## 2025-05-09 PROCEDURE — 97530 THERAPEUTIC ACTIVITIES: CPT | Mod: GO

## 2025-05-09 PROCEDURE — 36415 COLL VENOUS BLD VENIPUNCTURE: CPT

## 2025-05-09 PROCEDURE — 84100 ASSAY OF PHOSPHORUS: CPT

## 2025-05-09 PROCEDURE — 1200000002 HC GENERAL ROOM WITH TELEMETRY DAILY

## 2025-05-09 PROCEDURE — 85520 HEPARIN ASSAY: CPT | Performed by: INTERNAL MEDICINE

## 2025-05-09 RX ADMIN — WARFARIN SODIUM 3 MG: 3 TABLET ORAL at 18:06

## 2025-05-09 RX ADMIN — INSULIN LISPRO 3 UNITS: 100 INJECTION, SOLUTION INTRAVENOUS; SUBCUTANEOUS at 09:15

## 2025-05-09 RX ADMIN — ATORVASTATIN CALCIUM 40 MG: 40 TABLET, FILM COATED ORAL at 09:32

## 2025-05-09 RX ADMIN — ATENOLOL 50 MG: 50 TABLET ORAL at 09:15

## 2025-05-09 RX ADMIN — INSULIN LISPRO 6 UNITS: 100 INJECTION, SOLUTION INTRAVENOUS; SUBCUTANEOUS at 12:41

## 2025-05-09 RX ADMIN — MIRTAZAPINE 15 MG: 15 TABLET, FILM COATED ORAL at 20:00

## 2025-05-09 RX ADMIN — POTASSIUM CHLORIDE 20 MEQ: 1500 TABLET, EXTENDED RELEASE ORAL at 09:15

## 2025-05-09 RX ADMIN — ENOXAPARIN SODIUM 80 MG: 80 INJECTION SUBCUTANEOUS at 12:45

## 2025-05-09 RX ADMIN — INSULIN LISPRO 3 UNITS: 100 INJECTION, SOLUTION INTRAVENOUS; SUBCUTANEOUS at 16:08

## 2025-05-09 RX ADMIN — DONEPEZIL HYDROCHLORIDE 10 MG: 5 TABLET ORAL at 20:00

## 2025-05-09 RX ADMIN — DOCUSATE SODIUM 100 MG: 100 CAPSULE, LIQUID FILLED ORAL at 09:15

## 2025-05-09 ASSESSMENT — COGNITIVE AND FUNCTIONAL STATUS - GENERAL
DAILY ACTIVITIY SCORE: 18
PERSONAL GROOMING: A LITTLE
WALKING IN HOSPITAL ROOM: A LITTLE
STANDING UP FROM CHAIR USING ARMS: A LITTLE
HELP NEEDED FOR BATHING: A LITTLE
HELP NEEDED FOR BATHING: A LOT
MOVING TO AND FROM BED TO CHAIR: A LITTLE
MOVING FROM LYING ON BACK TO SITTING ON SIDE OF FLAT BED WITH BEDRAILS: A LITTLE
MOVING FROM LYING ON BACK TO SITTING ON SIDE OF FLAT BED WITH BEDRAILS: A LITTLE
PERSONAL GROOMING: A LITTLE
DRESSING REGULAR UPPER BODY CLOTHING: A LITTLE
MOBILITY SCORE: 17
MOBILITY SCORE: 18
CLIMB 3 TO 5 STEPS WITH RAILING: A LITTLE
STANDING UP FROM CHAIR USING ARMS: A LITTLE
DAILY ACTIVITIY SCORE: 18
STANDING UP FROM CHAIR USING ARMS: A LITTLE
DRESSING REGULAR LOWER BODY CLOTHING: A LOT
WALKING IN HOSPITAL ROOM: A LITTLE
DRESSING REGULAR LOWER BODY CLOTHING: A LITTLE
TOILETING: A LITTLE
TURNING FROM BACK TO SIDE WHILE IN FLAT BAD: A LITTLE
CLIMB 3 TO 5 STEPS WITH RAILING: A LOT
DRESSING REGULAR UPPER BODY CLOTHING: A LOT
DAILY ACTIVITIY SCORE: 14
CLIMB 3 TO 5 STEPS WITH RAILING: A LITTLE
DRESSING REGULAR UPPER BODY CLOTHING: A LITTLE
HELP NEEDED FOR BATHING: A LITTLE
MOVING FROM LYING ON BACK TO SITTING ON SIDE OF FLAT BED WITH BEDRAILS: A LITTLE
TURNING FROM BACK TO SIDE WHILE IN FLAT BAD: A LITTLE
PERSONAL GROOMING: A LITTLE
EATING MEALS: A LITTLE
MOBILITY SCORE: 18
MOVING TO AND FROM BED TO CHAIR: A LITTLE
MOVING TO AND FROM BED TO CHAIR: A LITTLE
TOILETING: A LOT
EATING MEALS: A LITTLE
DRESSING REGULAR LOWER BODY CLOTHING: A LITTLE
TURNING FROM BACK TO SIDE WHILE IN FLAT BAD: A LITTLE
TOILETING: A LITTLE
EATING MEALS: A LITTLE
WALKING IN HOSPITAL ROOM: A LITTLE

## 2025-05-09 ASSESSMENT — ACTIVITIES OF DAILY LIVING (ADL)
ADL_ASSISTANCE: NEEDS ASSISTANCE
BATHING_ASSISTANCE: MAXIMAL

## 2025-05-09 ASSESSMENT — PAIN SCALES - GENERAL
PAINLEVEL_OUTOF10: 0 - NO PAIN

## 2025-05-09 ASSESSMENT — PAIN - FUNCTIONAL ASSESSMENT
PAIN_FUNCTIONAL_ASSESSMENT: 0-10

## 2025-05-09 NOTE — PROGRESS NOTES
Occupational Therapy    OT Treatment    Patient Name: Inge Clark  MRN: [id number]  Department: [address]  Room: [address] [place] [address]  Today's Date: 5/9/2025  Time Calculation  Start Time: 1057  Stop Time: 1111  Time Calculation (min): 14 min        Assessment:  OT Assessment: to assess pt's functional ability within the acute setting  Prognosis: Good  Barriers to Discharge Home: No anticipated barriers  Evaluation/Treatment Tolerance: Patient tolerated treatment well  Medical Staff Made Aware: Yes  End of Session Communication: Bedside nurse  End of Session Patient Position: Bed, 3 rail up, Alarm on  OT Assessment Results: Decreased ADL status, Decreased upper extremity strength, Decreased cognition, Decreased endurance, Decreased fine motor control, Decreased functional mobility, Decreased IADLs, Decreased trunk control for functional activities  Prognosis: Good  Barriers to Discharge: None  Evaluation/Treatment Tolerance: Patient tolerated treatment well  Medical Staff Made Aware: Yes  Strengths: Attitude of self, Ability to acquire knowledge  Barriers to Participation: Comorbidities  Plan:  Treatment Interventions: ADL retraining, Functional transfer training, UE strengthening/ROM, Endurance training, Cognitive reorientation, Compensatory technique education, Fine motor coordination activities  OT Frequency: 2 times per week  OT Discharge Recommendations: Low intensity level of continued care  Equipment Recommended upon Discharge: Wheeled walker  OT Recommended Transfer Status: Minimal assist, Assist of 1  OT - OK to Discharge: Yes  Treatment Interventions: ADL retraining, Functional transfer training, UE strengthening/ROM, Endurance training, Cognitive reorientation, Compensatory technique education, Fine motor coordination activities    Subjective   Previous Visit Info:     General:  General  Reason for Referral: Pt is a 79 yo F referred to occupational therapy for impaired self-care and functional mobility 2/2  hospitalization for metabolic encephalopathy. [facility] to [facility]  Referred By: Keesha Dorado MD  Past Medical History Relevant to Rehab: Vascular dementia, dysphagia, HTN/HLD, T2DM, osteoarthritis  Family/Caregiver Present: No  Prior to Session Communication: Bedside nurse  Patient Position Received: Bed, 3 rail up, Alarm off, not on at start of session  Preferred Learning Style: visual, kinesthetic  General Comment: Pt pleasantly confused and agreeable to therapy session.  Precautions:  Medical Precautions: Fall precautions, Swallowing precautions  Precautions Comment: IV, tele, mary grace beasley            Pain:  Pain Assessment  Pain Assessment: 0-10  0-10 (Numeric) Pain Score: 0 - No pain    Objective    Cognition:  Cognition  Overall Cognitive Status: Impaired (pt showed some signs of being impulsive and required min vc to follow commands)  Coordination:  Movements are Fluid and Coordinated: Yes    Functional Standing Tolerance:  Time: about 3 minutes 2nd trial and about 2 minutes 1st trial  Activity: standing with FWW with contact guard  Functional Standing Tolerance Comments: pt benefited from tactile cue to maintain balance and decrease fall risk  Bed Mobility/Transfers: Bed Mobility  Bed Mobility: Yes  Bed Mobility 1  Bed Mobility 1: Supine to sitting  Level of Assistance 1: Minimum assistance, Minimal verbal cues  Bed Mobility Comments 1: proper sequencing and feet placment    Transfers  Transfer: Yes  Transfer 1  Transfer From 1: Bed to, Stand to (2 trials 4 t/f total)  Transfer to 1: Stand, Sit  Technique 1: Sit to stand, Stand to sit  Transfer Device 1: Walker  Transfer Level of Assistance 1: Minimum assistance  Trials/Comments 1: pt benefits from tactile cues for proper body mechanics and balance; sencond trail pt was more confident and less shakey      Functional Mobility:     Sitting Balance:  Static Sitting Balance  Static Sitting-Balance Support: Feet supported  Static Sitting-Level of  Assistance: Contact guard  Standing Balance:          Strength:  Strength Comments: pt was able to complete functional t/f with min assist    Outcome Measures:[facility] Daily Activity  Putting on and taking off regular lower body clothing: A lot  Bathing (including washing, rinsing, drying): A lot  Putting on and taking off regular upper body clothing: A lot  Toileting, which includes using toilet, bedpan or urinal: A lot  Taking care of personal grooming such as brushing teeth: A little  Eating Meals: A little  Daily Activity - Total Score: 14        Education Documentation  Body Mechanics, taught by Jess Grullon OT at 5/9/2025 12:59 PM.  Learner: Patient  Readiness: Acceptance  Method: Explanation  Response: Needs Reinforcement  Comment: proper hand placement with walker and proper body mechanics to maintain balance while using walker    ADL Training, taught by Jess Grullon OT at 5/9/2025 12:59 PM.  Learner: Patient  Readiness: Acceptance  Method: Explanation  Response: Needs Reinforcement  Comment: proper hand placement with walker and proper body mechanics to maintain balance while using walker    Education Comments  No comments found.        OP EDUCATION:       Goals:  Encounter Problems       Encounter Problems (Active)       ADLs       Patient will perform UB and LB bathing with minimal assist  level of assistance and PRN bathroom equipment.       Start:  05/07/25    Expected End:  05/21/25            Patient will complete daily grooming tasks with minimal assist  level of assistance and PRN adaptive equipment while supported sitting.       Start:  05/07/25    Expected End:  05/21/25            Patient will complete toileting including hygiene clothing management/hygiene with minimal assist  level of assistance and PRN bathroom equipment.       Start:  05/07/25    Expected End:  05/21/25               BALANCE       Pt will maintain static/dynamic standing balance during ADL task with contact  guard assist level of assistance in order to demonstrate decreased risk of falling and improved postural control.       Start:  05/07/25    Expected End:  05/21/25               TRANSFERS       Patient will perform bed mobility stand by assist level of assistance and bed rails in order to improve safety and independence with mobility       Start:  05/07/25    Expected End:  05/21/25            Patient will complete functional transfer to BSC/chair with least restrictive device with stand by assist level of assistance.       Start:  05/07/25    Expected End:  05/21/25

## 2025-05-09 NOTE — PROGRESS NOTES
Patient: Inge Clark   Age: 80 y.o.   Gender: female   Room/Bed: 127/127-A     Attending: Justin Casillas DO  Code Status:  Full Code  Admitted Date: 5/6/2025  3:36 PM    Chief Complaint:  Patient: Inge Clark is a 80 y.o. female with PMHX vascular dementia, dysphagia, HTN/HLD, T2DM (4/2025 8.4%) admitted for acute metabolic encephalopathy 2/2 poor intake and incidental infrarenal thrombus.    OVERNIGHT: No significant events reported.      SUBJECTIVE:  GOC discussion was held with  present in the room. Discussion of pursuing hospice care due to high risk of rehospitalization due to failure to thrive was placed.  is understanding and would like the medical team to inform either his son, Deacon, or daughter, Yves, regarding long term care. Patient's daughter was contacted and she indicates that she would like more information on hospice before making a decision.            ALLERGIES PAST MEDICAL HISTORY PAST SURGICAL SURGERY FAMILY HISTORY SOCIAL HISTORY   Allergies[1] Medical History[2] Surgical History[3] Family History[4] Social History[5]           MEDS:      HOME MEDS SCHEDULED MEDS PRN IV MEDS   Current Outpatient Medications   Medication Instructions    atenolol (TENORMIN) 50 mg, oral, Daily    atorvastatin (LIPITOR) 40 mg, oral, Daily    blood sugar diagnostic (OneTouch Ultra Test) TEST ONCE DAILY    blood-glucose meter,continuous misc Use as instructed    donepezil (ARICEPT) 10 mg, oral, Nightly    FreeStyle Elsie 14 Day Sensor kit Use as instructed    glipiZIDE 2.5 mg, oral, Daily    mirtazapine (REMERON) 15 mg, oral, Nightly    potassium chloride CR (Klor-Con M20) 20 mEq ER tablet 20 mEq, oral, Daily, Do not crush or chew.    Scheduled Medications[6] PRN Medications[7] Continuous Medications[8]               OBJECTIVE:  Physical Exam  Constitutional:       Appearance: Normal appearance.   Cardiovascular:      Rate and Rhythm: Normal rate and regular rhythm.      Pulses: Normal  pulses.      Heart sounds: Normal heart sounds.   Pulmonary:      Effort: Pulmonary effort is normal.      Breath sounds: Normal breath sounds.   Abdominal:      Palpations: Abdomen is soft.      Tenderness: There is no abdominal tenderness. There is no guarding or rebound.   Musculoskeletal:      Right lower leg: No edema.      Left lower leg: No edema.   Skin:     General: Skin is warm and dry.   Neurological:      General: No focal deficit present.      Mental Status: She is alert. Mental status is at baseline.   Psychiatric:         Mood and Affect: Mood normal.         Behavior: Behavior normal.                  VITALS:  Vitals:    05/08/25 2059 05/09/25 0355 05/09/25 0620 05/09/25 0730   BP:  128/71  120/62   BP Location:  Left arm     Patient Position:  Lying     Pulse: 73 78  78   Resp: 18 16     Temp:  36.6 °C (97.9 °F)  36.6 °C (97.9 °F)   TempSrc:  Temporal     SpO2: 96% 99%  98%   Weight:   55.9 kg (123 lb 4.8 oz)    Height:               Intake/Output Summary (Last 24 hours) at 5/9/2025 0938  Last data filed at 5/8/2025 1206  Gross per 24 hour   Intake 150 ml   Output 400 ml   Net -250 ml         LABS:     CBC:  Results from last 72 hours   Lab Units 05/09/25  0714 05/08/25  0610 05/07/25  0325   WBC AUTO x10*3/uL 7.0 7.2 9.1   RBC AUTO x10*6/uL 3.69* 3.63* 3.59*   HEMOGLOBIN g/dL 11.0* 10.9* 10.8*   HEMATOCRIT % 30.9* 32.6* 31.5*   MCV fL 84 90 88   MCH pg 29.8 30.0 30.1   MCHC g/dL 35.6 33.4 34.3   RDW % 13.7 13.7 13.7   PLATELETS AUTO x10*3/uL 106* 102* 84*     CMP:  Results from last 72 hours   Lab Units 05/09/25  0714 05/08/25  0610 05/07/25  0325   SODIUM mmol/L 134* 134* 134*   POTASSIUM mmol/L 4.1 2.9* 2.9*   CHLORIDE mmol/L 97* 95* 96*   CO2 mmol/L 26 27 26   ANION GAP mmol/L 15 15 15   BUN mg/dL 9 8 16   CREATININE mg/dL 0.61 0.68 0.81   EGFR mL/min/1.73m*2 >90 89 74   GLUCOSE mg/dL 188* 192* 194*     Results from last 72 hours   Lab Units 05/09/25  0714 05/08/25  0610 05/07/25  0325  "05/06/25  1549   ALBUMIN g/dL 3.2* 3.1* 3.3* 3.8   ALK PHOS U/L 91 83 86 93   ALT U/L 33 33 26 28   AST U/L 24 31 25 22   BILIRUBIN TOTAL mg/dL 0.6 0.7 1.1 1.3*   LIPASE U/L  --   --   --  20     Calcium/Phos:   Results from last 72 hours   Lab Units 05/09/25  0714 05/08/25  0610 05/07/25  0325   CALCIUM mg/dL 8.8 8.4* 8.6   PHOSPHORUS mg/dL 3.2 3.3 1.8*      COAG:   Results from last 72 hours   Lab Units 05/09/25  0714 05/08/25  0610 05/07/25  0325   INR  1.3* 1.3* 1.5*     CRP: No results found for: \"CRP\"    ENDO:  Recent Labs     04/26/25  0715 04/25/25  0510 01/28/25  0000 03/29/24  1600   TSH  --   --  1.24 1.12   HGBA1C 8.4* 8.3* 7.7* 8.0*      CARDIAC:   Results from last 72 hours   Lab Units 05/06/25  1716 05/06/25  1549   TROPHS ng/L 8 8       No data recorded    MICRO/ID:   No results found for the last 90 days.      Lab Results   Component Value Date    URINECULTURE  04/13/2025     Growth indicates contamination with periurethral howard. Repeat culture if clinically indicated.    BLOODCULT No growth at 2 days 05/06/2025    BLOODCULT No growth at 2 days 05/06/2025         NUTRITION STATUS:           IMAGING:     Imaging  No results found.      Cardiology, Vascular, and Other Imaging  No other imaging results found for the past 2 days           ASSESSMENT & PLAN  Inge Clark is a 80 y.o. female with PMHX vascular dementia, dysphagia, HTN/HLD, T2DM (4/2025 8.4%) admitted for acute metabolic encephalopathy 2/2 poor intake and incidental infrarenal thrombus.    ACUTE MEDICAL ISSUES  #IVC/infrarenal thrombus   ::Incidental finding  - likely from sedentary lifestyle   - CT PE: large partially occlusive thrombus within the IVC measuring approximately 11 cm in length extending from the iliocaval bifurcation and extending just above the renal veins. At most this   result in 85% stenosis in the infrarenal cava.  - Vascular Dr. Wang consulted by ED  Plan:  - Continue therapeutic lovenox and Warfarin 3 mg   - " [facility] recommended anticoagulation and to follow up outpatient with US     #Vascular dementia  #Failure to thrive  #Hypokalemia and Hyponatremia likely from low PO intake - improved  - Patient is AAOx1. Has poor PO intake at home.   - CT Head: unremarkable except small vessel ischemic changes  - S/P Vanc/zosyn in ED.   - COVID/Influenza/RSV negative. Pending: blood cultures  Plan:  - Continue home meds- Mirtazapine 15mg and Aricept 10mg nightly  - Replete electrolytes as needed  - Palliative consulted for vascular dementia, recommending hospice due to failure to thrive and high risk of rehospitalization.   - Nutrition consulted for poor oral intake, recommending Mighty Shake BID and Magic cup daily  - GOC discussed, hospice consulted for information.      #Thrombocytopenia - improving  #Anemia  - likely nutrition related or other causes like MDS  - No overt signs of bleeding.   Plan:  - Peripheral smear showing mild RBC polychromasia and pending [id number].   - Continue to monitor.         RESOLVED MEDICAL ISSUES:  #Metabolic encephalopathy 2/2 electrolyte imbalances vs worsening of underlying dementia   #Constipation  #AMARI likely pre-renal from low PO intake  #Fever   #Leukocytosis     CHRONIC MEDICAL ISSUES:  #HTN/HLD: continue atenolol 50mg and atorvastatin 40mg qhs  #T2DM: on glipizide 2.5mg at home. Currently on SSI.  #Dysphagia: on modified diet. Aspiration precautions. SLP evaluated - no dysphagia noted.         Fluids: PRN  Electrolytes: Replete PRN  Nutrition:Soft diet, 1:1 feeding and carb diet  GI Prophylaxis: None  DVT Prophylaxis: Heparin gtt, Reason: Possible IVC placement  BM: Last BM Date: 05/07/25    Access: PIV  Antibiotics: None, Vanc/Zosyn (5/6)  Oxygenation: RA    Emergency Contact: Extended Emergency Contact Information  Primary Emergency Contact: Deacon Clark  Home Phone: [phone]  Relation: Spouse  Secondary Emergency Contact: Adriel Clark  Mobile Phone:  [phone]  Relation: Son  Preferred language: English   needed? No    Dispo: TBA pending family decision post hospice information visit.        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    Other conditions influencing health status 01/29/2018    Normal colonoscopy    Pyelonephritis of left kidney 04/11/2023    Type 2 diabetes mellitus with other specified complication 12/29/2022    DM type 2 with diabetic mixed hyperlipidemia    Unilateral primary osteoarthritis, right hip 12/09/2019    Primary osteoarthritis of right hip    Vitamin D deficiency, unspecified     Vitamin D deficiency   [3]   Past Surgical History:  Procedure Laterality Date    OTHER SURGICAL HISTORY  09/30/2019    Tonsillectomy with adenoidectomy   [4]   Family History  Problem Relation Name Age of Onset    Stroke Mother      Heart attack Mother      Hypertension Father     [5]   Social History  Tobacco Use    Smoking status: Never    Smokeless tobacco: Never   Vaping Use    Vaping status: Never Used   Substance Use Topics    Alcohol use: Yes     Alcohol/week: 1.0 standard drink of alcohol     Types: 1 Standard drinks or equivalent per week     Comment: Socially    Drug use: Never   [6] atenolol, 50 mg, oral, Daily  atorvastatin, 40 mg, oral, Daily  docusate sodium, 100 mg, oral, Daily  donepezil, 10 mg, oral, Nightly  enoxaparin, 1.5 mg/kg, subcutaneous, q24h  [Held by provider] glipiZIDE, 2.5 mg, oral, Daily  insulin lispro, 0-15 Units, subcutaneous, TID AC  mirtazapine, 15 mg, oral, Nightly  potassium chloride CR, 20 mEq, oral,  Daily  warfarin, 3 mg, oral, Daily     [7] PRN medications: acetaminophen **OR** acetaminophen **OR** acetaminophen, bisacodyl, melatonin  [8]

## 2025-05-09 NOTE — PROGRESS NOTES
05/09/25 1322   Discharge Planning   Living Arrangements Spouse/significant other   Support Systems Spouse/significant other;Children   Assistance Needed Baseline A&Ox1, assistance with ADLs, utilizes a walker. Active with [facility](SN/PT). Spouse drives. Room air at baseline. PCP Maryann Marinelli APRN-CNS   Type of Residence Private residence   Number of Stairs to Enter Residence 3   Number of Stairs Within Residence 0   Do you have animals or pets at home? No   Home or Post Acute Services In home services   Type of Home Care Services Home PT;Home nursing visits   Expected Discharge Disposition Home Health  (Active with [facility]--- new [facility] referral pending)   Does the patient need discharge transport arranged? No   Stroke Family Assessment   Stroke Family Assessment Needed No   Intensity of Service   Intensity of Service 0-30 min

## 2025-05-09 NOTE — SIGNIFICANT EVENT
Palliative Care Social Work Note     Update from primary team that family is interested in a meeting with hospice.    Spoke with pt's dtr Yves.  She is on her way to [place].  She confirmed family would like to speak with hospice.  Briefly reviewed hospice services, what is covered, and agency options.   Dtr states pt's spouse cannot care for pt at home, looking at NH.  Dtr in agreement to a hospice meeting, [facility] is fine.  Dtr requested [facility] call her brother as she is out of town.   Son should be able to meet this weekend.  Referral sent to [facility] via [employer].  Team update.  [facility] information emailed to dtr:  [email or web address]    [facility] meeting 5/10/25 @ 930/1000.  Team updated.

## 2025-05-09 NOTE — PROGRESS NOTES
Physical Therapy    Physical Therapy Treatment    Patient Name: Inge Clark  MRN: [id number]  Department: [address]  Room: [address] [place] [address]  Today's Date: 5/9/2025  Time Calculation  Start Time: 1452  Stop Time: 1506  Time Calculation (min): 14 min         Assessment/Plan   PT Assessment  PT Assessment Results: Decreased endurance, Impaired balance, Decreased mobility, Decreased cognition  Rehab Prognosis: Fair  Barriers to Discharge Home: Cognition needs, Physical needs  Cognition Needs: 24hr supervision for safety awareness needed  Physical Needs: Stair navigation into home limited by function/safety, High falls risk due to function or environment, 24hr mobility assistance needed, 24hr ADL assistance needed  Evaluation/Treatment Tolerance: Patient tolerated treatment well  Medical Staff Made Aware: Yes  Strengths: Support of Caregivers  Barriers to Participation: Ability to acquire knowledge, Comorbidities, Insight into problems  End of Session Communication: Bedside nurse  Assessment Comment: Patient more alert and engaged on this date however remains AO x 1, demonstrates difficulty following instructions and remaining on task. Rec LOW intensity PT intervention.  End of Session Patient Position: Bed, 3 rail up, Alarm on  PT Plan  Inpatient/Swing Bed or Outpatient: Inpatient  PT Plan  Treatment/Interventions: Bed mobility, Transfer training, Gait training, Stair training, Balance training, Strengthening, Endurance training, Therapeutic exercise  PT Plan: Ongoing PT  PT Frequency: 2 times per week  PT Discharge Recommendations: Low intensity level of continued care  Equipment Recommended upon Discharge: Wheeled walker  PT Recommended Transfer Status: Assist x1  PT - OK to Discharge: Yes (per PT POC)      General Visit Information:   PT  Visit  PT Received On: 05/09/25  Response to Previous Treatment: Patient with no complaints from previous session.  General  Reason for Referral: Impaired functional mobility; metabolic  encephalopathy  Referred By: Keesha Dorado MD  Past Medical History Relevant to Rehab: Vascular dementia, dysphagia, HTN/HLD, T2DM, osteoarthritis  Family/Caregiver Present: No  Prior to Session Communication: Bedside nurse  Patient Position Received: Bed, 3 rail up, Alarm on  General Comment: Patient pleasantly confused, cooperative and agreeable to PT tx    Subjective   Precautions:  Precautions  Medical Precautions: Fall precautions (purewick, tele, masimo)            Objective   Pain:  Pain Assessment  Pain Assessment: 0-10  0-10 (Numeric) Pain Score: 0 - No pain  Cognition:  Cognition  Overall Cognitive Status: Impaired  Orientation Level: Disoriented to place, Disoriented to time, Disoriented to situation (Can state first name but not last)  Coordination:  Movements are Fluid and Coordinated: Yes  Postural Control:  Postural Control  Postural Control: Within Functional Limits  Static Sitting Balance  Static Sitting-Balance Support: Feet supported, No upper extremity supported  Static Sitting-Level of Assistance: Close supervision  Dynamic Sitting Balance  Dynamic Sitting-Balance Support: No upper extremity supported, Feet supported  Dynamic Sitting-Level of Assistance: Contact guard  Dynamic Sitting-Balance: Lateral lean  Static Standing Balance  Static Standing-Balance Support: Right upper extremity supported  Static Standing-Level of Assistance: Contact guard    Activity Tolerance:  Activity Tolerance  Endurance: Tolerates 10 - 20 min exercise with multiple rests  Treatments:  Therapeutic Exercise  Therapeutic Exercise Performed: Yes  Therapeutic Exercise Activity 1: Attempted to initiate ther ex, patient unable to follow instructions appropriately    Bed Mobility  Bed Mobility: Yes  Bed Mobility 1  Bed Mobility 1: Supine to sitting  Level of Assistance 1: Minimum assistance  Bed Mobility 2  Bed Mobility  2: Sitting to supine  Level of Assistance 2: Close supervision    Ambulation/Gait  Training  Ambulation/Gait Training Performed: Yes  Ambulation/Gait Training 1  Surface 1: Level tile  Device 1: No device  Assistance 1: Minimum assistance  Quality of Gait 1:  (retropulsive, decreased step length)  Comments/Distance (ft) 1: 12'  Transfers  Transfer: Yes  Transfer 1  Transfer From 1: Sit to, Stand to  Transfer to 1: Sit, Stand  Technique 1: Sit to stand, Stand to sit  Transfer Level of Assistance 1: Minimum assistance  Trials/Comments 1: R UE support         Outcome Measures:  [facility] Basic Mobility  Turning from your back to your side while in a flat bed without using bedrails: A little  Moving from lying on your back to sitting on the side of a flat bed without using bedrails: A little  Moving to and from bed to chair (including a wheelchair): A little  Standing up from a chair using your arms (e.g. wheelchair or bedside chair): A little  To walk in hospital room: A little  Climbing 3-5 steps with railing: A little  Basic Mobility - Total Score: 18    Education Documentation  Mobility Training, taught by Ely Ruiz, PT at 5/9/2025  3:25 PM.  Learner: Patient  Readiness: Acceptance  Method: Explanation  Response: Verbalizes Understanding, Needs Reinforcement  Comment: Importance of staff assist for all functional mobility, use of call light    Education Comments  No comments found.        OP EDUCATION:       Encounter Problems       Encounter Problems (Active)       Mobility       STG - Patient will ambulate with walker 50' CGA  (Progressing)       Start:  05/07/25    Expected End:  05/21/25            STG - Patient will ascend and descend four stairs B rails CGA  (Progressing)       Start:  05/07/25    Expected End:  05/21/25               PT Transfers       STG - Patient will perform bed mobility independently  (Progressing)       Start:  05/07/25    Expected End:  05/21/25            STG - Patient will transfer sit to and from stand SBA  (Progressing)       Start:  05/07/25    Expected End:   05/21/25               Pain - Adult

## 2025-05-10 LAB
ALBUMIN SERPL BCP-MCNC: 3.1 G/DL (ref 3.4–5)
ALP SERPL-CCNC: 84 U/L (ref 33–136)
ALT SERPL W P-5'-P-CCNC: 29 U/L (ref 7–45)
ANION GAP SERPL CALC-SCNC: 14 MMOL/L (ref 10–20)
AST SERPL W P-5'-P-CCNC: 18 U/L (ref 9–39)
BASOPHILS # BLD AUTO: 0.02 X10*3/UL (ref 0–0.1)
BASOPHILS NFR BLD AUTO: 0.3 %
BILIRUB SERPL-MCNC: 0.4 MG/DL (ref 0–1.2)
BUN SERPL-MCNC: 13 MG/DL (ref 6–23)
CALCIUM SERPL-MCNC: 8.8 MG/DL (ref 8.6–10.3)
CHLORIDE SERPL-SCNC: 99 MMOL/L (ref 98–107)
CO2 SERPL-SCNC: 24 MMOL/L (ref 21–32)
CREAT SERPL-MCNC: 0.7 MG/DL (ref 0.5–1.05)
EGFRCR SERPLBLD CKD-EPI 2021: 89 ML/MIN/1.73M*2
EOSINOPHIL # BLD AUTO: 0.21 X10*3/UL (ref 0–0.4)
EOSINOPHIL NFR BLD AUTO: 2.7 %
ERYTHROCYTE [DISTWIDTH] IN BLOOD BY AUTOMATED COUNT: 13.6 % (ref 11.5–14.5)
GLUCOSE BLD MANUAL STRIP-MCNC: 154 MG/DL (ref 74–99)
GLUCOSE BLD MANUAL STRIP-MCNC: 188 MG/DL (ref 74–99)
GLUCOSE BLD MANUAL STRIP-MCNC: 205 MG/DL (ref 74–99)
GLUCOSE BLD MANUAL STRIP-MCNC: 261 MG/DL (ref 74–99)
GLUCOSE SERPL-MCNC: 215 MG/DL (ref 74–99)
HCT VFR BLD AUTO: 31.4 % (ref 36–46)
HGB BLD-MCNC: 10.4 G/DL (ref 12–16)
IMM GRANULOCYTES # BLD AUTO: 0.03 X10*3/UL (ref 0–0.5)
IMM GRANULOCYTES NFR BLD AUTO: 0.4 % (ref 0–0.9)
INR PPP: 1.3 (ref 0.9–1.1)
LYMPHOCYTES # BLD AUTO: 1.68 X10*3/UL (ref 0.8–3)
LYMPHOCYTES NFR BLD AUTO: 21.6 %
MAGNESIUM SERPL-MCNC: 1.2 MG/DL (ref 1.6–2.4)
MCH RBC QN AUTO: 29.5 PG (ref 26–34)
MCHC RBC AUTO-ENTMCNC: 33.1 G/DL (ref 32–36)
MCV RBC AUTO: 89 FL (ref 80–100)
MONOCYTES # BLD AUTO: 0.46 X10*3/UL (ref 0.05–0.8)
MONOCYTES NFR BLD AUTO: 5.9 %
NEUTROPHILS # BLD AUTO: 5.37 X10*3/UL (ref 1.6–5.5)
NEUTROPHILS NFR BLD AUTO: 69.1 %
NRBC BLD-RTO: 0 /100 WBCS (ref 0–0)
PHOSPHATE SERPL-MCNC: 3.4 MG/DL (ref 2.5–4.9)
PLATELET # BLD AUTO: 87 X10*3/UL (ref 150–450)
POTASSIUM SERPL-SCNC: 4.2 MMOL/L (ref 3.5–5.3)
PROT SERPL-MCNC: 6.4 G/DL (ref 6.4–8.2)
PROTHROMBIN TIME: 14.8 SECONDS (ref 9.8–12.4)
RBC # BLD AUTO: 3.53 X10*6/UL (ref 4–5.2)
SODIUM SERPL-SCNC: 133 MMOL/L (ref 136–145)
UFH PPP CHRO-ACNC: 0.1 IU/ML (ref ?–1.1)
WBC # BLD AUTO: 7.8 X10*3/UL (ref 4.4–11.3)

## 2025-05-10 PROCEDURE — 83735 ASSAY OF MAGNESIUM: CPT

## 2025-05-10 PROCEDURE — 84100 ASSAY OF PHOSPHORUS: CPT

## 2025-05-10 PROCEDURE — 2500000002 HC RX 250 W HCPCS SELF ADMINISTERED DRUGS (ALT 637 FOR MEDICARE OP, ALT 636 FOR OP/ED): Performed by: BEHAVIOR TECHNICIAN

## 2025-05-10 PROCEDURE — 2500000001 HC RX 250 WO HCPCS SELF ADMINISTERED DRUGS (ALT 637 FOR MEDICARE OP): Performed by: BEHAVIOR TECHNICIAN

## 2025-05-10 PROCEDURE — 85520 HEPARIN ASSAY: CPT | Performed by: INTERNAL MEDICINE

## 2025-05-10 PROCEDURE — 99232 SBSQ HOSP IP/OBS MODERATE 35: CPT | Performed by: BEHAVIOR TECHNICIAN

## 2025-05-10 PROCEDURE — 85610 PROTHROMBIN TIME: CPT

## 2025-05-10 PROCEDURE — 2500000001 HC RX 250 WO HCPCS SELF ADMINISTERED DRUGS (ALT 637 FOR MEDICARE OP)

## 2025-05-10 PROCEDURE — 2500000004 HC RX 250 GENERAL PHARMACY W/ HCPCS (ALT 636 FOR OP/ED): Mod: JZ | Performed by: BEHAVIOR TECHNICIAN

## 2025-05-10 PROCEDURE — 1200000002 HC GENERAL ROOM WITH TELEMETRY DAILY

## 2025-05-10 PROCEDURE — 2500000002 HC RX 250 W HCPCS SELF ADMINISTERED DRUGS (ALT 637 FOR MEDICARE OP, ALT 636 FOR OP/ED)

## 2025-05-10 PROCEDURE — 82947 ASSAY GLUCOSE BLOOD QUANT: CPT

## 2025-05-10 PROCEDURE — 36415 COLL VENOUS BLD VENIPUNCTURE: CPT

## 2025-05-10 PROCEDURE — 80053 COMPREHEN METABOLIC PANEL: CPT

## 2025-05-10 PROCEDURE — 85025 COMPLETE CBC W/AUTO DIFF WBC: CPT

## 2025-05-10 RX ORDER — INSULIN LISPRO 100 [IU]/ML
0-15 INJECTION, SOLUTION INTRAVENOUS; SUBCUTANEOUS
Status: DISCONTINUED | OUTPATIENT
Start: 2025-05-10 | End: 2025-05-12 | Stop reason: HOSPADM

## 2025-05-10 RX ORDER — WARFARIN SODIUM 5 MG/1
5 TABLET ORAL DAILY
Status: DISCONTINUED | OUTPATIENT
Start: 2025-05-10 | End: 2025-05-12

## 2025-05-10 RX ORDER — MAGNESIUM SULFATE HEPTAHYDRATE 40 MG/ML
2 INJECTION, SOLUTION INTRAVENOUS ONCE
Status: COMPLETED | OUTPATIENT
Start: 2025-05-10 | End: 2025-05-10

## 2025-05-10 RX ADMIN — INSULIN LISPRO 6 UNITS: 100 INJECTION, SOLUTION INTRAVENOUS; SUBCUTANEOUS at 20:37

## 2025-05-10 RX ADMIN — MIRTAZAPINE 15 MG: 15 TABLET, FILM COATED ORAL at 20:14

## 2025-05-10 RX ADMIN — INSULIN LISPRO 9 UNITS: 100 INJECTION, SOLUTION INTRAVENOUS; SUBCUTANEOUS at 13:06

## 2025-05-10 RX ADMIN — DONEPEZIL HYDROCHLORIDE 10 MG: 5 TABLET ORAL at 20:14

## 2025-05-10 RX ADMIN — WARFARIN SODIUM 5 MG: 5 TABLET ORAL at 18:40

## 2025-05-10 RX ADMIN — MAGNESIUM SULFATE HEPTAHYDRATE 2 G: 2 INJECTION, SOLUTION INTRAVENOUS at 09:14

## 2025-05-10 RX ADMIN — ENOXAPARIN SODIUM 80 MG: 80 INJECTION SUBCUTANEOUS at 13:04

## 2025-05-10 RX ADMIN — ATENOLOL 50 MG: 50 TABLET ORAL at 09:22

## 2025-05-10 RX ADMIN — ATORVASTATIN CALCIUM 40 MG: 40 TABLET, FILM COATED ORAL at 09:22

## 2025-05-10 RX ADMIN — POTASSIUM CHLORIDE 20 MEQ: 1500 TABLET, EXTENDED RELEASE ORAL at 09:22

## 2025-05-10 RX ADMIN — DOCUSATE SODIUM 100 MG: 100 CAPSULE, LIQUID FILLED ORAL at 09:22

## 2025-05-10 ASSESSMENT — COGNITIVE AND FUNCTIONAL STATUS - GENERAL
PERSONAL GROOMING: A LOT
EATING MEALS: A LITTLE
DAILY ACTIVITIY SCORE: 16
MOBILITY SCORE: 15
TURNING FROM BACK TO SIDE WHILE IN FLAT BAD: A LITTLE
WALKING IN HOSPITAL ROOM: A LOT
TOILETING: A LOT
DRESSING REGULAR LOWER BODY CLOTHING: A LITTLE
MOVING FROM LYING ON BACK TO SITTING ON SIDE OF FLAT BED WITH BEDRAILS: A LITTLE
PERSONAL GROOMING: A LOT
CLIMB 3 TO 5 STEPS WITH RAILING: TOTAL
DRESSING REGULAR UPPER BODY CLOTHING: A LITTLE
TURNING FROM BACK TO SIDE WHILE IN FLAT BAD: A LITTLE
MOBILITY SCORE: 15
WALKING IN HOSPITAL ROOM: A LOT
STANDING UP FROM CHAIR USING ARMS: A LITTLE
STANDING UP FROM CHAIR USING ARMS: A LITTLE
HELP NEEDED FOR BATHING: A LITTLE
TOILETING: A LOT
CLIMB 3 TO 5 STEPS WITH RAILING: TOTAL
MOVING FROM LYING ON BACK TO SITTING ON SIDE OF FLAT BED WITH BEDRAILS: A LITTLE
MOVING TO AND FROM BED TO CHAIR: A LITTLE
HELP NEEDED FOR BATHING: A LITTLE
MOVING TO AND FROM BED TO CHAIR: A LITTLE
DAILY ACTIVITIY SCORE: 16
DRESSING REGULAR LOWER BODY CLOTHING: A LITTLE
EATING MEALS: A LITTLE
DRESSING REGULAR UPPER BODY CLOTHING: A LITTLE

## 2025-05-10 ASSESSMENT — PAIN SCALES - PAIN ASSESSMENT IN ADVANCED DEMENTIA (PAINAD)
FACIALEXPRESSION: SMILING OR INEXPRESSIVE
BODYLANGUAGE: RELAXED
TOTALSCORE: 0
BREATHING: NORMAL
CONSOLABILITY: NO NEED TO CONSOLE

## 2025-05-10 ASSESSMENT — PAIN - FUNCTIONAL ASSESSMENT
PAIN_FUNCTIONAL_ASSESSMENT: PAINAD (PAIN ASSESSMENT IN ADVANCED DEMENTIA SCALE)
PAIN_FUNCTIONAL_ASSESSMENT: 0-10

## 2025-05-10 ASSESSMENT — PAIN SCALES - GENERAL: PAINLEVEL_OUTOF10: 0 - NO PAIN

## 2025-05-10 NOTE — ACP (ADVANCE CARE PLANNING)
Confirming Previous Code Status: Yes    This author was informed of change in CODE STATUS after hospice meeting this a.m. with patient, spouse, and son present.  This author was not present during the discussion, and was notified by hospital staff and hospice team of the changes.

## 2025-05-10 NOTE — CARE PLAN
The patient's goals for the shift include  unable to verbalize    The clinical goals for the shift include safety, out of bed for meals, comfort, orient as needed      Problem: Pain - Adult  Goal: Verbalizes/displays adequate comfort level or baseline comfort level  Outcome: Progressing     Problem: Safety - Adult  Goal: Free from fall injury  Outcome: Progressing     Problem: Discharge Planning  Goal: Discharge to home or other facility with appropriate resources  Outcome: Progressing     Problem: Chronic Conditions and Co-morbidities  Goal: Patient's chronic conditions and co-morbidity symptoms are monitored and maintained or improved  Outcome: Progressing

## 2025-05-10 NOTE — PROGRESS NOTES
Patient: Inge Clark   Age: 80 y.o.   Gender: female   Room/Bed: 127/127-A     Attending: Justin Casillas DO  Code Status:  DNR and No Intubation  Admitted Date: 5/6/2025  3:36 PM    Chief Complaint:  Patient: Inge Clark is a 80 y.o. female with PMHX vascular dementia, dysphagia, HTN/HLD, T2DM (4/2025 8.4%) admitted for acute metabolic encephalopathy 2/2 poor intake and incidental infrarenal thrombus.    OVERNIGHT: No significant events reported.      SUBJECTIVE:  No acute complaints or concerns.  at bedside and they are both aware of the upcoming hospice meeting. Their son was estimated to arrive at 9:30 AM to attend the discussion.            ALLERGIES PAST MEDICAL HISTORY PAST SURGICAL SURGERY FAMILY HISTORY SOCIAL HISTORY   Allergies[1] Medical History[2] Surgical History[3] Family History[4] Social History[5]           MEDS:      HOME MEDS SCHEDULED MEDS PRN IV MEDS   Current Outpatient Medications   Medication Instructions    atenolol (TENORMIN) 50 mg, oral, Daily    atorvastatin (LIPITOR) 40 mg, oral, Daily    blood sugar diagnostic (OneTouch Ultra Test) TEST ONCE DAILY    blood-glucose meter,continuous misc Use as instructed    donepezil (ARICEPT) 10 mg, oral, Nightly    FreeStyle Elsie 14 Day Sensor kit Use as instructed    glipiZIDE 2.5 mg, oral, Daily    mirtazapine (REMERON) 15 mg, oral, Nightly    potassium chloride CR (Klor-Con M20) 20 mEq ER tablet 20 mEq, oral, Daily, Do not crush or chew.    Scheduled Medications[6] PRN Medications[7] Continuous Medications[8]               OBJECTIVE:  Physical Exam  Constitutional:       Appearance: Normal appearance.   Cardiovascular:      Rate and Rhythm: Normal rate and regular rhythm.      Pulses: Normal pulses.      Heart sounds: Normal heart sounds.   Pulmonary:      Effort: Pulmonary effort is normal.      Breath sounds: Normal breath sounds.   Abdominal:      Palpations: Abdomen is soft.      Tenderness: There is no abdominal tenderness.  There is no guarding or rebound.   Musculoskeletal:      Right lower leg: No edema.      Left lower leg: No edema.   Skin:     General: Skin is warm and dry.   Neurological:      General: No focal deficit present.      Mental Status: She is alert. Mental status is at baseline.   Psychiatric:         Mood and Affect: Mood normal.         Behavior: Behavior normal.                  VITALS:  Vitals:    05/09/25 1556 05/09/25 1930 05/10/25 0410 05/10/25 0815   BP: 104/54 112/62 114/60 108/65   BP Location: Left arm Left arm Left arm Left arm   Patient Position:  Lying Lying Sitting   Pulse: 77 78 76 88   Resp: 20 17 16 18   Temp: 37.2 °C (99 °F) 36.9 °C (98.4 °F) 37.2 °C (99 °F) 36.5 °C (97.7 °F)   TempSrc: Temporal Temporal Temporal Temporal   SpO2: 98% 94% 95% 97%   Weight:       Height:               Intake/Output Summary (Last 24 hours) at 5/10/2025 1054  Last data filed at 5/10/2025 0920  Gross per 24 hour   Intake 340 ml   Output 1000 ml   Net -660 ml         LABS:     CBC:  Results from last 72 hours   Lab Units 05/10/25  0638 05/09/25  0714 05/08/25  0610   WBC AUTO x10*3/uL 7.8 7.0 7.2   RBC AUTO x10*6/uL 3.53* 3.69* 3.63*   HEMOGLOBIN g/dL 10.4* 11.0* 10.9*   HEMATOCRIT % 31.4* 30.9* 32.6*   MCV fL 89 84 90   MCH pg 29.5 29.8 30.0   MCHC g/dL 33.1 35.6 33.4   RDW % 13.6 13.7 13.7   PLATELETS AUTO x10*3/uL 87* 106* 102*     CMP:  Results from last 72 hours   Lab Units 05/10/25  0638 05/09/25  0714 05/08/25  0610   SODIUM mmol/L 133* 134* 134*   POTASSIUM mmol/L 4.2 4.1 2.9*   CHLORIDE mmol/L 99 97* 95*   CO2 mmol/L 24 26 27   ANION GAP mmol/L 14 15 15   BUN mg/dL 13 9 8   CREATININE mg/dL 0.70 0.61 0.68   EGFR mL/min/1.73m*2 89 >90 89   GLUCOSE mg/dL 215* 188* 192*     Results from last 72 hours   Lab Units 05/10/25  0638 05/09/25  0714 05/08/25  0610   ALBUMIN g/dL 3.1* 3.2* 3.1*   ALK PHOS U/L 84 91 83   ALT U/L 29 33 33   AST U/L 18 24 31   BILIRUBIN TOTAL mg/dL 0.4 0.6 0.7     Calcium/Phos:   Results from  "last 72 hours   Lab Units 05/10/25  0638 05/09/25  0714 05/08/25  0610   CALCIUM mg/dL 8.8 8.8 8.4*   PHOSPHORUS mg/dL 3.4 3.2 3.3      COAG:   Results from last 72 hours   Lab Units 05/10/25  0638 05/09/25  0714 05/08/25  0610   INR  1.3* 1.3* 1.3*     CRP: No results found for: \"CRP\"    ENDO:  Recent Labs     04/26/25  0715 04/25/25  0510 01/28/25  0000 03/29/24  1600   TSH  --   --  1.24 1.12   HGBA1C 8.4* 8.3* 7.7* 8.0*      CARDIAC:         No lab exists for component: \"CK\", \"CKMBP\"      No data recorded    MICRO/ID:   No results found for the last 90 days.      Lab Results   Component Value Date    URINECULTURE  04/13/2025     Growth indicates contamination with periurethral howard. Repeat culture if clinically indicated.    BLOODCULT No growth at 3 days 05/06/2025    BLOODCULT No growth at 3 days 05/06/2025         NUTRITION STATUS:           IMAGING:     Imaging  No results found.      Cardiology, Vascular, and Other Imaging  No other imaging results found for the past 2 days           ASSESSMENT & PLAN  Inge Clark is a 80 y.o. female with PMHX vascular dementia, dysphagia, HTN/HLD, T2DM (4/2025 8.4%) admitted for acute metabolic encephalopathy 2/2 poor intake and incidental infrarenal thrombus.    ACUTE MEDICAL ISSUES  #IVC/infrarenal thrombus   ::Incidental finding  - likely from sedentary lifestyle   - CT PE: large partially occlusive thrombus within the IVC measuring approximately 11 cm in length extending from the iliocaval bifurcation and extending just above the renal veins. At most this   result in 85% stenosis in the infrarenal cava.  - Vascular Dr. Wang consulted by ED  Plan:  - Continue therapeutic lovenox and increased Warfarin to 5 mg to attain therapeutic range  - Endovascular Limb & Salvage recommended anticoagulation and to follow up outpatient with US     #Vascular dementia  #Failure to thrive  #Hypokalemia and Hyponatremia likely from low PO intake - improved  - Patient is AAOx1. Has " poor PO intake at home.   - CT Head: unremarkable except small vessel ischemic changes  - S/P Vanc/zosyn in ED.   - COVID/Influenza/RSV negative. Pending: blood cultures  Plan:  - Continue home meds- Mirtazapine 15mg and Aricept 10mg nightly  - Replete electrolytes as needed  - Palliative consulted for vascular dementia, recommending hospice due to failure to thrive and high risk of rehospitalization.   - Nutrition consulted for poor oral intake, recommending Mighty Shake BID and Magic cup daily  - Pending hospice discussion in AM     #Thrombocytopenia - mild decline  #Anemia  - likely nutrition related or other causes like MDS  - No overt signs of bleeding.   Plan:  - Peripheral smear showing mild RBC polychromasia and pending [id number].   - Continue to monitor.         RESOLVED MEDICAL ISSUES:  #Metabolic encephalopathy 2/2 electrolyte imbalances vs worsening of underlying dementia   #Constipation  #AMARI likely pre-renal from low PO intake  #Fever   #Leukocytosis     CHRONIC MEDICAL ISSUES:  #HTN/HLD: continue atenolol 50mg and atorvastatin 40mg qhs  #T2DM: on glipizide 2.5mg at home. Currently on SSI.  #Dysphagia: on modified diet. Aspiration precautions. SLP evaluated - no dysphagia noted.         Fluids: PRN  Electrolytes: Replete PRN  Nutrition:Soft diet, 1:1 feeding and carb diet  GI Prophylaxis: None  DVT Prophylaxis: Heparin gtt, Reason: Possible IVC placement  BM: Last BM Date: 05/07/25    Access: PIV  Antibiotics: None, Vanc/Zosyn (5/6)  Oxygenation: RA    Emergency Contact: Extended Emergency Contact Information  Primary Emergency Contact: MarianmeghanalDeacon  Home Phone: [phone]  Relation: Spouse  Secondary Emergency Contact: AmberAdriel  Mobile Phone: [phone]  Relation: Son  Preferred language: English   needed? No    Dispo: Pending hospice visit.        Juanita Tenorio,   Internal Medicine, PGY-1         [1]   Allergies  Allergen Reactions    Grass Pollen Unknown   [2]   Past Medical  History:  Diagnosis Date    Elevated troponin I level 08/15/2024    Essential (primary) hypertension 12/29/2022    HTN (hypertension), benign    Insect bite (nonvenomous) of scalp, initial encounter (CODE) 06/05/2017    Tick bite of scalp, initial encounter    Listeriosis, unspecified 05/23/2016    Listeria infection    Other conditions influencing health status 01/29/2018    Normal colonoscopy    Pyelonephritis of left kidney 04/11/2023    Type 2 diabetes mellitus with other specified complication 12/29/2022    DM type 2 with diabetic mixed hyperlipidemia    Unilateral primary osteoarthritis, right hip 12/09/2019    Primary osteoarthritis of right hip    Vitamin D deficiency, unspecified     Vitamin D deficiency   [3]   Past Surgical History:  Procedure Laterality Date    OTHER SURGICAL HISTORY  09/30/2019    Tonsillectomy with adenoidectomy   [4]   Family History  Problem Relation Name Age of Onset    Stroke Mother      Heart attack Mother      Hypertension Father     [5]   Social History  Tobacco Use    Smoking status: Never    Smokeless tobacco: Never   Vaping Use    Vaping status: Never Used   Substance Use Topics    Alcohol use: Yes     Alcohol/week: 1.0 standard drink of alcohol     Types: 1 Standard drinks or equivalent per week     Comment: Socially    Drug use: Never   [6] atenolol, 50 mg, oral, Daily  atorvastatin, 40 mg, oral, Daily  docusate sodium, 100 mg, oral, Daily  donepezil, 10 mg, oral, Nightly  enoxaparin, 1.5 mg/kg, subcutaneous, q24h  [Held by provider] glipiZIDE, 2.5 mg, oral, Daily  insulin lispro, 0-15 Units, subcutaneous, Before meals & nightly  magnesium sulfate, 2 g, intravenous, Once  mirtazapine, 15 mg, oral, Nightly  potassium chloride CR, 20 mEq, oral, Daily  warfarin, 5 mg, oral, Daily     [7] PRN medications: acetaminophen **OR** acetaminophen **OR** acetaminophen, bisacodyl, melatonin  [8]

## 2025-05-11 ENCOUNTER — DOCUMENTATION (OUTPATIENT)
Dept: HOME HEALTH SERVICES | Facility: HOME HEALTH | Age: 80
End: 2025-05-11
Payer: MEDICARE

## 2025-05-11 VITALS
SYSTOLIC BLOOD PRESSURE: 116 MMHG | DIASTOLIC BLOOD PRESSURE: 63 MMHG | WEIGHT: 123.3 LBS | TEMPERATURE: 97.5 F | RESPIRATION RATE: 20 BRPM | BODY MASS INDEX: 21.85 KG/M2 | HEART RATE: 84 BPM | HEIGHT: 63 IN | OXYGEN SATURATION: 96 %

## 2025-05-11 LAB
ALBUMIN SERPL BCP-MCNC: 3.1 G/DL (ref 3.4–5)
ALP SERPL-CCNC: 82 U/L (ref 33–136)
ALT SERPL W P-5'-P-CCNC: 33 U/L (ref 7–45)
ANION GAP SERPL CALC-SCNC: 12 MMOL/L (ref 10–20)
AST SERPL W P-5'-P-CCNC: 27 U/L (ref 9–39)
BACTERIA BLD CULT: NORMAL
BACTERIA BLD CULT: NORMAL
BASOPHILS # BLD AUTO: 0.02 X10*3/UL (ref 0–0.1)
BASOPHILS NFR BLD AUTO: 0.3 %
BILIRUB SERPL-MCNC: 0.4 MG/DL (ref 0–1.2)
BUN SERPL-MCNC: 16 MG/DL (ref 6–23)
CALCIUM SERPL-MCNC: 8.8 MG/DL (ref 8.6–10.3)
CHLORIDE SERPL-SCNC: 99 MMOL/L (ref 98–107)
CO2 SERPL-SCNC: 26 MMOL/L (ref 21–32)
CREAT SERPL-MCNC: 0.73 MG/DL (ref 0.5–1.05)
EGFRCR SERPLBLD CKD-EPI 2021: 84 ML/MIN/1.73M*2
EOSINOPHIL # BLD AUTO: 0.21 X10*3/UL (ref 0–0.4)
EOSINOPHIL NFR BLD AUTO: 2.8 %
ERYTHROCYTE [DISTWIDTH] IN BLOOD BY AUTOMATED COUNT: 13.7 % (ref 11.5–14.5)
GLUCOSE BLD MANUAL STRIP-MCNC: 157 MG/DL (ref 74–99)
GLUCOSE BLD MANUAL STRIP-MCNC: 195 MG/DL (ref 74–99)
GLUCOSE BLD MANUAL STRIP-MCNC: 241 MG/DL (ref 74–99)
GLUCOSE SERPL-MCNC: 195 MG/DL (ref 74–99)
HCT VFR BLD AUTO: 31.9 % (ref 36–46)
HGB BLD-MCNC: 10.6 G/DL (ref 12–16)
IMM GRANULOCYTES # BLD AUTO: 0.04 X10*3/UL (ref 0–0.5)
IMM GRANULOCYTES NFR BLD AUTO: 0.5 % (ref 0–0.9)
INR PPP: 1.4 (ref 0.9–1.1)
LYMPHOCYTES # BLD AUTO: 1.65 X10*3/UL (ref 0.8–3)
LYMPHOCYTES NFR BLD AUTO: 22 %
MAGNESIUM SERPL-MCNC: 1.45 MG/DL (ref 1.6–2.4)
MCH RBC QN AUTO: 29.4 PG (ref 26–34)
MCHC RBC AUTO-ENTMCNC: 33.2 G/DL (ref 32–36)
MCV RBC AUTO: 88 FL (ref 80–100)
MONOCYTES # BLD AUTO: 0.49 X10*3/UL (ref 0.05–0.8)
MONOCYTES NFR BLD AUTO: 6.5 %
NEUTROPHILS # BLD AUTO: 5.08 X10*3/UL (ref 1.6–5.5)
NEUTROPHILS NFR BLD AUTO: 67.9 %
NRBC BLD-RTO: 0 /100 WBCS (ref 0–0)
PHOSPHATE SERPL-MCNC: 3.3 MG/DL (ref 2.5–4.9)
PLATELET # BLD AUTO: 92 X10*3/UL (ref 150–450)
POTASSIUM SERPL-SCNC: 4.3 MMOL/L (ref 3.5–5.3)
PROT SERPL-MCNC: 6.7 G/DL (ref 6.4–8.2)
PROTHROMBIN TIME: 15.6 SECONDS (ref 9.8–12.4)
RBC # BLD AUTO: 3.61 X10*6/UL (ref 4–5.2)
SODIUM SERPL-SCNC: 133 MMOL/L (ref 136–145)
WBC # BLD AUTO: 7.5 X10*3/UL (ref 4.4–11.3)

## 2025-05-11 PROCEDURE — 2500000001 HC RX 250 WO HCPCS SELF ADMINISTERED DRUGS (ALT 637 FOR MEDICARE OP)

## 2025-05-11 PROCEDURE — 2500000001 HC RX 250 WO HCPCS SELF ADMINISTERED DRUGS (ALT 637 FOR MEDICARE OP): Performed by: BEHAVIOR TECHNICIAN

## 2025-05-11 PROCEDURE — 82947 ASSAY GLUCOSE BLOOD QUANT: CPT

## 2025-05-11 PROCEDURE — 85025 COMPLETE CBC W/AUTO DIFF WBC: CPT

## 2025-05-11 PROCEDURE — 2500000004 HC RX 250 GENERAL PHARMACY W/ HCPCS (ALT 636 FOR OP/ED): Mod: JZ

## 2025-05-11 PROCEDURE — 36415 COLL VENOUS BLD VENIPUNCTURE: CPT

## 2025-05-11 PROCEDURE — 84100 ASSAY OF PHOSPHORUS: CPT

## 2025-05-11 PROCEDURE — 1200000002 HC GENERAL ROOM WITH TELEMETRY DAILY

## 2025-05-11 PROCEDURE — 85610 PROTHROMBIN TIME: CPT

## 2025-05-11 PROCEDURE — 2500000002 HC RX 250 W HCPCS SELF ADMINISTERED DRUGS (ALT 637 FOR MEDICARE OP, ALT 636 FOR OP/ED)

## 2025-05-11 PROCEDURE — 2500000002 HC RX 250 W HCPCS SELF ADMINISTERED DRUGS (ALT 637 FOR MEDICARE OP, ALT 636 FOR OP/ED): Performed by: BEHAVIOR TECHNICIAN

## 2025-05-11 PROCEDURE — 83735 ASSAY OF MAGNESIUM: CPT

## 2025-05-11 PROCEDURE — 99232 SBSQ HOSP IP/OBS MODERATE 35: CPT

## 2025-05-11 PROCEDURE — 80053 COMPREHEN METABOLIC PANEL: CPT

## 2025-05-11 PROCEDURE — 2500000004 HC RX 250 GENERAL PHARMACY W/ HCPCS (ALT 636 FOR OP/ED): Mod: JZ | Performed by: BEHAVIOR TECHNICIAN

## 2025-05-11 RX ORDER — MAGNESIUM SULFATE HEPTAHYDRATE 40 MG/ML
2 INJECTION, SOLUTION INTRAVENOUS ONCE
Status: COMPLETED | OUTPATIENT
Start: 2025-05-11 | End: 2025-05-11

## 2025-05-11 RX ADMIN — DOCUSATE SODIUM 100 MG: 100 CAPSULE, LIQUID FILLED ORAL at 08:06

## 2025-05-11 RX ADMIN — MAGNESIUM SULFATE HEPTAHYDRATE 2 G: 2 INJECTION, SOLUTION INTRAVENOUS at 09:54

## 2025-05-11 RX ADMIN — MIRTAZAPINE 15 MG: 15 TABLET, FILM COATED ORAL at 20:06

## 2025-05-11 RX ADMIN — ATENOLOL 50 MG: 50 TABLET ORAL at 08:05

## 2025-05-11 RX ADMIN — INSULIN LISPRO 6 UNITS: 100 INJECTION, SOLUTION INTRAVENOUS; SUBCUTANEOUS at 12:49

## 2025-05-11 RX ADMIN — ATORVASTATIN CALCIUM 40 MG: 40 TABLET, FILM COATED ORAL at 08:05

## 2025-05-11 RX ADMIN — POTASSIUM CHLORIDE 20 MEQ: 1500 TABLET, EXTENDED RELEASE ORAL at 08:06

## 2025-05-11 RX ADMIN — WARFARIN SODIUM 5 MG: 5 TABLET ORAL at 17:45

## 2025-05-11 RX ADMIN — ENOXAPARIN SODIUM 80 MG: 80 INJECTION SUBCUTANEOUS at 12:49

## 2025-05-11 RX ADMIN — INSULIN LISPRO 3 UNITS: 100 INJECTION, SOLUTION INTRAVENOUS; SUBCUTANEOUS at 08:05

## 2025-05-11 RX ADMIN — DONEPEZIL HYDROCHLORIDE 10 MG: 5 TABLET ORAL at 20:06

## 2025-05-11 ASSESSMENT — COGNITIVE AND FUNCTIONAL STATUS - GENERAL
MOBILITY SCORE: 12
HELP NEEDED FOR BATHING: A LOT
MOVING TO AND FROM BED TO CHAIR: A LOT
DAILY ACTIVITIY SCORE: 14
MOVING TO AND FROM BED TO CHAIR: A LOT
TOILETING: A LOT
CLIMB 3 TO 5 STEPS WITH RAILING: TOTAL
CLIMB 3 TO 5 STEPS WITH RAILING: TOTAL
WALKING IN HOSPITAL ROOM: A LOT
TURNING FROM BACK TO SIDE WHILE IN FLAT BAD: A LITTLE
DRESSING REGULAR UPPER BODY CLOTHING: A LOT
STANDING UP FROM CHAIR USING ARMS: A LOT
MOBILITY SCORE: 12
DRESSING REGULAR UPPER BODY CLOTHING: A LOT
EATING MEALS: A LITTLE
DRESSING REGULAR LOWER BODY CLOTHING: A LOT
TOILETING: A LOT
PERSONAL GROOMING: A LITTLE
STANDING UP FROM CHAIR USING ARMS: A LOT
PERSONAL GROOMING: A LITTLE
WALKING IN HOSPITAL ROOM: A LOT
MOVING FROM LYING ON BACK TO SITTING ON SIDE OF FLAT BED WITH BEDRAILS: A LOT
HELP NEEDED FOR BATHING: A LOT
EATING MEALS: A LITTLE
MOVING FROM LYING ON BACK TO SITTING ON SIDE OF FLAT BED WITH BEDRAILS: A LOT
DRESSING REGULAR LOWER BODY CLOTHING: A LOT
DAILY ACTIVITIY SCORE: 14
TURNING FROM BACK TO SIDE WHILE IN FLAT BAD: A LITTLE

## 2025-05-11 ASSESSMENT — PAIN SCALES - GENERAL
PAINLEVEL_OUTOF10: 0 - NO PAIN
PAINLEVEL_OUTOF10: 0 - NO PAIN

## 2025-05-11 ASSESSMENT — PAIN - FUNCTIONAL ASSESSMENT: PAIN_FUNCTIONAL_ASSESSMENT: 0-10

## 2025-05-11 NOTE — HH CARE COORDINATION
Home Care received a referral for Nursing, Physical Therapy, and Occupational Therapy. Unfortunately, we are unable to accept and process the referral at this time.    Reason:  Patient is Active with Another Home Care Agency  Pt to [place] home Mon 5/12 with resumption of [facility] PT OT with new SW to be added.)     Patients, please reach out to the referring provider or your PCP to assist in obtaining an alternative home care agency and/or guidance to meet your needs.    Providers, please reach out to  Home Care at [phone] with any questions regarding the declined referral.

## 2025-05-11 NOTE — PROGRESS NOTES
Patient: Inge Clark   Age: 80 y.o.   Gender: female   Room/Bed: 127/127-A     Attending: Justin Casillas DO  Code Status:  DNR and No Intubation  Admitted Date: 5/6/2025  3:36 PM    Chief Complaint:  Patient: Inge Clark is a 80 y.o. female with PMHX vascular dementia, dysphagia, HTN/HLD, T2DM (4/2025 8.4%) admitted for acute metabolic encephalopathy 2/2 poor intake and incidental infrarenal thrombus.    OVERNIGHT: No significant events reported.      SUBJECTIVE:  No acute complaints or concerns.  Had hospice care meeting today.  Patient and spouse not quite ready for hospice at this time, [facility]           ALLERGIES PAST MEDICAL HISTORY PAST SURGICAL SURGERY FAMILY HISTORY SOCIAL HISTORY   Allergies[1] Medical History[2] Surgical History[3] Family History[4] Social History[5]           MEDS:      HOME MEDS SCHEDULED MEDS PRN IV MEDS   Current Outpatient Medications   Medication Instructions    atenolol (TENORMIN) 50 mg, oral, Daily    atorvastatin (LIPITOR) 40 mg, oral, Daily    blood sugar diagnostic (OneTouch Ultra Test) TEST ONCE DAILY    blood-glucose meter,continuous misc Use as instructed    donepezil (ARICEPT) 10 mg, oral, Nightly    FreeStyle Elsie 14 Day Sensor kit Use as instructed    glipiZIDE 2.5 mg, oral, Daily    mirtazapine (REMERON) 15 mg, oral, Nightly    potassium chloride CR (Klor-Con M20) 20 mEq ER tablet 20 mEq, oral, Daily, Do not crush or chew.    Scheduled Medications[6] PRN Medications[7] Continuous Medications[8]               OBJECTIVE:  Physical Exam  Constitutional:       Appearance: Normal appearance.   Cardiovascular:      Rate and Rhythm: Normal rate and regular rhythm.      Pulses: Normal pulses.      Heart sounds: Normal heart sounds.   Pulmonary:      Effort: Pulmonary effort is normal.      Breath sounds: Normal breath sounds.   Abdominal:      Palpations: Abdomen is soft.      Tenderness: There is no abdominal  tenderness. There is no guarding or rebound.   Musculoskeletal:      Right lower leg: No edema.      Left lower leg: No edema.   Skin:     General: Skin is warm and dry.   Neurological:      General: No focal deficit present.      Mental Status: She is alert. Mental status is at baseline.   Psychiatric:         Mood and Affect: Mood normal.         Behavior: Behavior normal.                  VITALS:  Vitals:    05/10/25 1623 05/10/25 1910 05/11/25 0435 05/11/25 0745   BP: 113/69 140/69 168/79 117/66   BP Location: Left arm Left arm Left arm Left arm   Patient Position:  Lying Lying    Pulse: 109 83 77 78   Resp: 20 18 18 20   Temp: 36.3 °C (97.3 °F) 36.9 °C (98.4 °F) 36.7 °C (98.1 °F) 36.5 °C (97.7 °F)   TempSrc: Temporal Temporal Temporal Temporal   SpO2: 96% 96% 96% 97%   Weight:       Height:               Intake/Output Summary (Last 24 hours) at 5/11/2025 1144  Last data filed at 5/11/2025 0918  Gross per 24 hour   Intake 470 ml   Output 0 ml   Net 470 ml         LABS:     CBC:  Results from last 72 hours   Lab Units 05/11/25  0613 05/10/25  0638 05/09/25  0714   WBC AUTO x10*3/uL 7.5 7.8 7.0   RBC AUTO x10*6/uL 3.61* 3.53* 3.69*   HEMOGLOBIN g/dL 10.6* 10.4* 11.0*   HEMATOCRIT % 31.9* 31.4* 30.9*   MCV fL 88 89 84   MCH pg 29.4 29.5 29.8   MCHC g/dL 33.2 33.1 35.6   RDW % 13.7 13.6 13.7   PLATELETS AUTO x10*3/uL 92* 87* 106*     CMP:  Results from last 72 hours   Lab Units 05/11/25  0613 05/10/25  0638 05/09/25  0714   SODIUM mmol/L 133* 133* 134*   POTASSIUM mmol/L 4.3 4.2 4.1   CHLORIDE mmol/L 99 99 97*   CO2 mmol/L 26 24 26   ANION GAP mmol/L 12 14 15   BUN mg/dL 16 13 9   CREATININE mg/dL 0.73 0.70 0.61   EGFR mL/min/1.73m*2 84 89 >90   GLUCOSE mg/dL 195* 215* 188*     Results from last 72 hours   Lab Units 05/11/25  0613 05/10/25  0638 05/09/25  0714   ALBUMIN g/dL 3.1* 3.1* 3.2*   ALK PHOS U/L 82 84 91   ALT U/L 33 29 33   AST U/L 27 18 24   BILIRUBIN TOTAL mg/dL 0.4 0.4 0.6     Calcium/Phos:   Results  "from last 72 hours   Lab Units 05/11/25  0613 05/10/25  0638 05/09/25  0714   CALCIUM mg/dL 8.8 8.8 8.8   PHOSPHORUS mg/dL 3.3 3.4 3.2      COAG:   Results from last 72 hours   Lab Units 05/11/25  0613 05/10/25  0638 05/09/25  0714   INR  1.4* 1.3* 1.3*     CRP: No results found for: \"CRP\"    ENDO:  Recent Labs     04/26/25  0715 04/25/25  0510 01/28/25  0000 03/29/24  1600   TSH  --   --  1.24 1.12   HGBA1C 8.4* 8.3* 7.7* 8.0*      CARDIAC:         No lab exists for component: \"CK\", \"CKMBP\"      No data recorded    MICRO/ID:   No results found for the last 90 days.      Lab Results   Component Value Date    URINECULTURE  04/13/2025     Growth indicates contamination with periurethral howard. Repeat culture if clinically indicated.    BLOODCULT No growth at 4 days -  FINAL REPORT 05/06/2025    BLOODCULT No growth at 4 days -  FINAL REPORT 05/06/2025         NUTRITION STATUS:           IMAGING:     Imaging  No results found.      Cardiology, Vascular, and Other Imaging  No other imaging results found for the past 2 days           ASSESSMENT & PLAN  Inge Clark is a 80 y.o. female with PMHX vascular dementia, dysphagia, HTN/HLD, T2DM (4/2025 8.4%) admitted for acute metabolic encephalopathy 2/2 poor intake and incidental infrarenal thrombus.    Updates 05/11/25   [facility] referral placed    ACUTE MEDICAL ISSUES  #IVC/infrarenal thrombus   :: Incidental finding  - likely from sedentary lifestyle   - CT PE: large partially occlusive thrombus within the IVC measuring approximately 11 cm in length extending from the iliocaval bifurcation and extending just above the renal veins. At most this   result in 85% stenosis in the infrarenal cava.  - Vascular Dr. Wang consulted by ED  Plan:  - Continue therapeutic lovenox and increased Warfarin to 5 mg to attain therapeutic range  - Endovascular Limb & Salvage recommended anticoagulation and to follow up outpatient with US     #Vascular dementia  #Failure to thrive  #Hypokalemia " and Hyponatremia likely from low PO intake - improved  - Patient is AAOx1. Has poor PO intake at home.   - CT Head: unremarkable except small vessel ischemic changes  - S/P Vanc/zosyn in ED.   - COVID/Influenza/RSV negative. Pending: blood cultures  Plan:  - Continue home meds- Mirtazapine 15mg and Aricept 10mg nightly  - Replete electrolytes as needed  - Palliative consulted for vascular dementia, recommending hospice due to failure to thrive and high risk of rehospitalization.   - Nutrition consulted for poor oral intake, recommending Mighty Shake BID and Magic cup daily  - Pending hospice discussion in AM     #Thrombocytopenia - mild decline  #Anemia  - likely nutrition related or other causes like MDS  - No overt signs of bleeding.   Plan:  - Peripheral smear showing mild RBC polychromasia and pending [id number].   - Continue to monitor.     RESOLVED MEDICAL ISSUES:  #Metabolic encephalopathy 2/2 electrolyte imbalances vs worsening of underlying dementia   #Constipation  #AMARI likely pre-renal from low PO intake  #Fever   #Leukocytosis     CHRONIC MEDICAL ISSUES:  #HTN/HLD: continue atenolol 50mg and atorvastatin 40mg qhs  #T2DM: on glipizide 2.5mg at home. Currently on SSI.  #Dysphagia: on modified diet. Aspiration precautions. SLP evaluated - no dysphagia noted.     Fluids: PRN  Electrolytes: Replete PRN  Nutrition:Soft diet, 1:1 feeding and carb diet  GI Prophylaxis: None  DVT Prophylaxis: Heparin gtt, Reason: Possible IVC placement  BM: Last BM Date: 05/07/25    Access: PIV  Antibiotics: None, Vanc/Zosyn (5/6)  Oxygenation: RA    Emergency Contact: Extended Emergency Contact Information  Primary Emergency Contact: Deacon Clark  Home Phone: [phone]  Relation: Spouse  Secondary Emergency Contact: Adriel Clark  Mobile Phone: [phone]  Relation: Son  Preferred language: English   needed? No    Dispo: Will be discharged tomorrow with home health care    Danni Daily MD  Internal Medicine,  PGY- 2  05/11/25 at 11:57 AM           [1]   Allergies  Allergen Reactions    Grass Pollen Unknown   [2]   Past Medical History:  Diagnosis Date    Elevated troponin I level 08/15/2024    Essential (primary) hypertension 12/29/2022    HTN (hypertension), benign    Insect bite (nonvenomous) of scalp, initial encounter (CODE) 06/05/2017    Tick bite of scalp, initial encounter    Listeriosis, unspecified 05/23/2016    Listeria infection    Other conditions influencing health status 01/29/2018    Normal colonoscopy    Pyelonephritis of left kidney 04/11/2023    Type 2 diabetes mellitus with other specified complication 12/29/2022    DM type 2 with diabetic mixed hyperlipidemia    Unilateral primary osteoarthritis, right hip 12/09/2019    Primary osteoarthritis of right hip    Vitamin D deficiency, unspecified     Vitamin D deficiency   [3]   Past Surgical History:  Procedure Laterality Date    OTHER SURGICAL HISTORY  09/30/2019    Tonsillectomy with adenoidectomy   [4]   Family History  Problem Relation Name Age of Onset    Stroke Mother      Heart attack Mother      Hypertension Father     [5]   Social History  Tobacco Use    Smoking status: Never    Smokeless tobacco: Never   Vaping Use    Vaping status: Never Used   Substance Use Topics    Alcohol use: Yes     Alcohol/week: 1.0 standard drink of alcohol     Types: 1 Standard drinks or equivalent per week     Comment: Socially    Drug use: Never   [6] atenolol, 50 mg, oral, Daily  atorvastatin, 40 mg, oral, Daily  docusate sodium, 100 mg, oral, Daily  donepezil, 10 mg, oral, Nightly  enoxaparin, 1.5 mg/kg, subcutaneous, q24h  [Held by provider] glipiZIDE, 2.5 mg, oral, Daily  insulin lispro, 0-15 Units, subcutaneous, Before meals & nightly  magnesium sulfate, 2 g, intravenous, Once  mirtazapine, 15 mg, oral, Nightly  potassium chloride CR, 20 mEq, oral, Daily  warfarin, 5 mg, oral, Daily     [7] PRN medications: acetaminophen **OR** acetaminophen **OR**  acetaminophen, bisacodyl, melatonin  [8]

## 2025-05-11 NOTE — CARE PLAN
The patient's goals for the shift include  use call light for assistance     The clinical goals for the shift include Patient will be safe and comfortable throughout shift

## 2025-05-11 NOTE — SIGNIFICANT EVENT
Palliative Care Social Work Note     Per [facility], hospice meeting was informational only and pt referred to [facility].  Team updated.     Spoke with pt's dtr Yves.  She said family is undecided about hospice.  She feels pt does need placement but concerned about cost.  Family is hiring an  to assist.   Pt will dc home with home care.   Dtr requested dc 5/12 and transport home.   Team updated.

## 2025-05-11 NOTE — PROGRESS NOTES
05/11/25 1000   Discharge Planning   Expected Discharge Disposition Home Health  (Made aware spouse and fam deny hospice. Referral to navigator. Pt to dc home Mon 5/12 with resumption of [facility] PT OT with new SW to be added.)

## 2025-05-12 ENCOUNTER — PHARMACY VISIT (OUTPATIENT)
Dept: PHARMACY | Facility: CLINIC | Age: 80
End: 2025-05-12
Payer: COMMERCIAL

## 2025-05-12 VITALS
BODY MASS INDEX: 21.85 KG/M2 | OXYGEN SATURATION: 100 % | TEMPERATURE: 97.9 F | SYSTOLIC BLOOD PRESSURE: 117 MMHG | WEIGHT: 123.3 LBS | RESPIRATION RATE: 20 BRPM | HEIGHT: 63 IN | DIASTOLIC BLOOD PRESSURE: 75 MMHG | HEART RATE: 101 BPM

## 2025-05-12 PROBLEM — I82.90 THROMBUS: Status: ACTIVE | Noted: 2025-05-12

## 2025-05-12 LAB
ALBUMIN SERPL BCP-MCNC: 3.3 G/DL (ref 3.4–5)
ALP SERPL-CCNC: 87 U/L (ref 33–136)
ALT SERPL W P-5'-P-CCNC: 35 U/L (ref 7–45)
ANION GAP SERPL CALC-SCNC: 13 MMOL/L (ref 10–20)
AST SERPL W P-5'-P-CCNC: 25 U/L (ref 9–39)
BASOPHILS # BLD AUTO: 0.02 X10*3/UL (ref 0–0.1)
BASOPHILS NFR BLD AUTO: 0.3 %
BILIRUB SERPL-MCNC: 0.3 MG/DL (ref 0–1.2)
BUN SERPL-MCNC: 19 MG/DL (ref 6–23)
CALCIUM SERPL-MCNC: 8.8 MG/DL (ref 8.6–10.3)
CHLORIDE SERPL-SCNC: 98 MMOL/L (ref 98–107)
CO2 SERPL-SCNC: 26 MMOL/L (ref 21–32)
CREAT SERPL-MCNC: 0.72 MG/DL (ref 0.5–1.05)
EGFRCR SERPLBLD CKD-EPI 2021: 86 ML/MIN/1.73M*2
EOSINOPHIL # BLD AUTO: 0.18 X10*3/UL (ref 0–0.4)
EOSINOPHIL NFR BLD AUTO: 2.4 %
ERYTHROCYTE [DISTWIDTH] IN BLOOD BY AUTOMATED COUNT: 14 % (ref 11.5–14.5)
GLUCOSE BLD MANUAL STRIP-MCNC: 167 MG/DL (ref 74–99)
GLUCOSE BLD MANUAL STRIP-MCNC: 269 MG/DL (ref 74–99)
GLUCOSE SERPL-MCNC: 224 MG/DL (ref 74–99)
HCT VFR BLD AUTO: 34.2 % (ref 36–46)
HGB BLD-MCNC: 11.3 G/DL (ref 12–16)
IMM GRANULOCYTES # BLD AUTO: 0.03 X10*3/UL (ref 0–0.5)
IMM GRANULOCYTES NFR BLD AUTO: 0.4 % (ref 0–0.9)
INR PPP: 1.6 (ref 0.9–1.1)
LYMPHOCYTES # BLD AUTO: 1.49 X10*3/UL (ref 0.8–3)
LYMPHOCYTES NFR BLD AUTO: 20.3 %
MAGNESIUM SERPL-MCNC: 1.6 MG/DL (ref 1.6–2.4)
MCH RBC QN AUTO: 29.7 PG (ref 26–34)
MCHC RBC AUTO-ENTMCNC: 33 G/DL (ref 32–36)
MCV RBC AUTO: 90 FL (ref 80–100)
MONOCYTES # BLD AUTO: 0.44 X10*3/UL (ref 0.05–0.8)
MONOCYTES NFR BLD AUTO: 6 %
NEUTROPHILS # BLD AUTO: 5.19 X10*3/UL (ref 1.6–5.5)
NEUTROPHILS NFR BLD AUTO: 70.6 %
NRBC BLD-RTO: 0 /100 WBCS (ref 0–0)
PHOSPHATE SERPL-MCNC: 2.9 MG/DL (ref 2.5–4.9)
PLATELET # BLD AUTO: 99 X10*3/UL (ref 150–450)
POTASSIUM SERPL-SCNC: 4.3 MMOL/L (ref 3.5–5.3)
PROT SERPL-MCNC: 6.9 G/DL (ref 6.4–8.2)
PROTHROMBIN TIME: 18.1 SECONDS (ref 9.8–12.4)
RBC # BLD AUTO: 3.81 X10*6/UL (ref 4–5.2)
SODIUM SERPL-SCNC: 133 MMOL/L (ref 136–145)
WBC # BLD AUTO: 7.4 X10*3/UL (ref 4.4–11.3)

## 2025-05-12 PROCEDURE — 2500000001 HC RX 250 WO HCPCS SELF ADMINISTERED DRUGS (ALT 637 FOR MEDICARE OP)

## 2025-05-12 PROCEDURE — 82947 ASSAY GLUCOSE BLOOD QUANT: CPT

## 2025-05-12 PROCEDURE — 84100 ASSAY OF PHOSPHORUS: CPT

## 2025-05-12 PROCEDURE — 97530 THERAPEUTIC ACTIVITIES: CPT | Mod: GO

## 2025-05-12 PROCEDURE — 2500000002 HC RX 250 W HCPCS SELF ADMINISTERED DRUGS (ALT 637 FOR MEDICARE OP, ALT 636 FOR OP/ED)

## 2025-05-12 PROCEDURE — 2500000004 HC RX 250 GENERAL PHARMACY W/ HCPCS (ALT 636 FOR OP/ED): Mod: JZ | Performed by: BEHAVIOR TECHNICIAN

## 2025-05-12 PROCEDURE — 83735 ASSAY OF MAGNESIUM: CPT

## 2025-05-12 PROCEDURE — 85025 COMPLETE CBC W/AUTO DIFF WBC: CPT

## 2025-05-12 PROCEDURE — RXMED WILLOW AMBULATORY MEDICATION CHARGE

## 2025-05-12 PROCEDURE — 97530 THERAPEUTIC ACTIVITIES: CPT | Mod: GP

## 2025-05-12 PROCEDURE — 36415 COLL VENOUS BLD VENIPUNCTURE: CPT

## 2025-05-12 PROCEDURE — 2500000002 HC RX 250 W HCPCS SELF ADMINISTERED DRUGS (ALT 637 FOR MEDICARE OP, ALT 636 FOR OP/ED): Performed by: BEHAVIOR TECHNICIAN

## 2025-05-12 PROCEDURE — 2500000001 HC RX 250 WO HCPCS SELF ADMINISTERED DRUGS (ALT 637 FOR MEDICARE OP): Performed by: BEHAVIOR TECHNICIAN

## 2025-05-12 PROCEDURE — 85610 PROTHROMBIN TIME: CPT

## 2025-05-12 PROCEDURE — 80053 COMPREHEN METABOLIC PANEL: CPT

## 2025-05-12 PROCEDURE — 99239 HOSP IP/OBS DSCHRG MGMT >30: CPT | Performed by: BEHAVIOR TECHNICIAN

## 2025-05-12 RX ORDER — ENOXAPARIN SODIUM 100 MG/ML
80 INJECTION SUBCUTANEOUS ONCE
Qty: 0.8 ML | Refills: 0 | Status: SHIPPED | OUTPATIENT
Start: 2025-05-13 | End: 2025-05-13

## 2025-05-12 RX ORDER — WARFARIN 7.5 MG/1
TABLET ORAL
Qty: 1 TABLET | Refills: 0 | Status: ON HOLD | OUTPATIENT
Start: 2025-05-12 | End: 2025-05-22 | Stop reason: ENTERED-IN-ERROR

## 2025-05-12 RX ORDER — DOCUSATE SODIUM 100 MG/1
100 CAPSULE, LIQUID FILLED ORAL DAILY
Qty: 30 CAPSULE | Refills: 0 | Status: SHIPPED | OUTPATIENT
Start: 2025-05-12 | End: 2025-05-23 | Stop reason: HOSPADM

## 2025-05-12 RX ORDER — WARFARIN SODIUM 5 MG/1
5 TABLET ORAL EVERY EVENING
Qty: 30 TABLET | Refills: 0 | Status: ON HOLD | OUTPATIENT
Start: 2025-05-13 | End: 2025-05-23

## 2025-05-12 RX ORDER — WARFARIN SODIUM 5 MG/1
7.5 TABLET ORAL ONCE
Status: DISCONTINUED | OUTPATIENT
Start: 2025-05-12 | End: 2025-05-12 | Stop reason: HOSPADM

## 2025-05-12 RX ADMIN — ATORVASTATIN CALCIUM 40 MG: 40 TABLET, FILM COATED ORAL at 09:10

## 2025-05-12 RX ADMIN — DOCUSATE SODIUM 100 MG: 100 CAPSULE, LIQUID FILLED ORAL at 09:10

## 2025-05-12 RX ADMIN — ATENOLOL 50 MG: 50 TABLET ORAL at 09:10

## 2025-05-12 RX ADMIN — INSULIN LISPRO 6 UNITS: 100 INJECTION, SOLUTION INTRAVENOUS; SUBCUTANEOUS at 12:04

## 2025-05-12 RX ADMIN — POTASSIUM CHLORIDE 20 MEQ: 1500 TABLET, EXTENDED RELEASE ORAL at 09:10

## 2025-05-12 RX ADMIN — ENOXAPARIN SODIUM 80 MG: 80 INJECTION SUBCUTANEOUS at 12:03

## 2025-05-12 RX ADMIN — INSULIN LISPRO 3 UNITS: 100 INJECTION, SOLUTION INTRAVENOUS; SUBCUTANEOUS at 09:14

## 2025-05-12 SDOH — SOCIAL STABILITY: SOCIAL NETWORK: COMMUNITY RESOURCES: TRANSPORTATION

## 2025-05-12 ASSESSMENT — COGNITIVE AND FUNCTIONAL STATUS - GENERAL
MOBILITY SCORE: 18
STANDING UP FROM CHAIR USING ARMS: A LOT
PERSONAL GROOMING: A LITTLE
WALKING IN HOSPITAL ROOM: A LOT
DRESSING REGULAR UPPER BODY CLOTHING: A LOT
EATING MEALS: A LITTLE
DAILY ACTIVITIY SCORE: 14
MOVING FROM LYING ON BACK TO SITTING ON SIDE OF FLAT BED WITH BEDRAILS: A LITTLE
DRESSING REGULAR LOWER BODY CLOTHING: A LITTLE
MOVING TO AND FROM BED TO CHAIR: A LITTLE
HELP NEEDED FOR BATHING: A LOT
HELP NEEDED FOR BATHING: A LITTLE
CLIMB 3 TO 5 STEPS WITH RAILING: A LITTLE
TURNING FROM BACK TO SIDE WHILE IN FLAT BAD: A LITTLE
DRESSING REGULAR LOWER BODY CLOTHING: A LOT
EATING MEALS: A LITTLE
STANDING UP FROM CHAIR USING ARMS: A LITTLE
DRESSING REGULAR UPPER BODY CLOTHING: A LITTLE
CLIMB 3 TO 5 STEPS WITH RAILING: TOTAL
TOILETING: A LOT
PERSONAL GROOMING: A LITTLE
MOVING TO AND FROM BED TO CHAIR: A LOT
TOILETING: A LOT
WALKING IN HOSPITAL ROOM: A LITTLE
DAILY ACTIVITIY SCORE: 17
MOVING FROM LYING ON BACK TO SITTING ON SIDE OF FLAT BED WITH BEDRAILS: A LITTLE
TURNING FROM BACK TO SIDE WHILE IN FLAT BAD: A LITTLE
MOBILITY SCORE: 13

## 2025-05-12 ASSESSMENT — PAIN SCALES - GENERAL
PAINLEVEL_OUTOF10: 0 - NO PAIN

## 2025-05-12 ASSESSMENT — PAIN - FUNCTIONAL ASSESSMENT
PAIN_FUNCTIONAL_ASSESSMENT: 0-10
PAIN_FUNCTIONAL_ASSESSMENT: 0-10

## 2025-05-12 NOTE — DISCHARGE INSTR - APPOINTMENTS
Transportation Options:    *[facility] TRANSPORTATION AUTHORITY  [phone] ([phone]    *[facility] GeronimoMark-[phone]    *Call your insurance to see if they offer transportation

## 2025-05-12 NOTE — CARE PLAN
The patient's goals for the shift include  use call light for assistance     The clinical goals for the shift include Patient will remain safe

## 2025-05-12 NOTE — NURSING NOTE
1508 Discharge instructions reviewed with pt. All questions and concerns addressed. IV removed. Medications provided by outpatient pharmacy.

## 2025-05-12 NOTE — DISCHARGE SUMMARY
Discharge Diagnosis  Metabolic encephalopathy 2/2 failure to thrive  Vascular dementia  IVC/Infrarenal thrombus           Issues Requiring Follow-Up  Failure to thrive - [facility] with additional SW  Vascular dementia  IVC/Infrarenal thrombus - Warfarin and to follow up with coumadin clinic.    Discharge Meds     Medication List      START taking these medications     enoxaparin 80 mg/0.8 mL syringe; Commonly known as: Lovenox; Inject 0.8   mL (80 mg) under the skin 1 time for 1 dose. Do not fill before May 13,   2025.; Start taking on: May 13, 2025   * warfarin 7.5 mg tablet; Commonly known as: Coumadin; Take 1 tablet   once in the evening for 1 dose.   * warfarin 5 mg tablet; Commonly known as: Coumadin; Take 1 tablet (5   mg) by mouth once daily in the evening. Take as directed per After Visit   Summary. Do not fill before May 13, 2025.; Start taking on: May 13, 2025  * This list has 2 medication(s) that are the same as other medications   prescribed for you. Read the directions carefully, and ask your doctor or   other care provider to review them with you.     CHANGE how you take these medications     docusate sodium 100 mg capsule; Commonly known as: Colace; Take 1   capsule (100 mg) by mouth once daily.; What changed: when to take this     CONTINUE taking these medications     atenolol 50 mg tablet; Commonly known as: Tenormin; Take 1 tablet (50   mg) by mouth once daily.   atorvastatin 40 mg tablet; Commonly known as: Lipitor; Take 1 tablet (40   mg) by mouth once daily.   blood-glucose,,cont misc; Use as instructed   donepezil 10 mg tablet; Commonly known as: Aricept; Take 1 tablet (10   mg) by mouth once daily at bedtime.   FreeStyle Elsie 14 Day Sensor kit; Generic drug: flash glucose sensor   kit; Use as instructed   glipiZIDE 2.5 mg tablet; Take 2.5 mg by mouth once daily.   mirtazapine 15 mg tablet; Commonly known as: Remeron; Take 1 tablet (15   mg) by mouth once daily at bedtime.   OneTouch Ultra  Test; Generic drug: blood sugar diagnostic; TEST ONCE   DAILY   potassium chloride CR 20 mEq ER tablet; Commonly known as: Klor-Con M20;   Take 1 tablet (20 mEq) by mouth once daily. Do not crush or chew.       Test Results Pending At Discharge  Pending Labs       No current pending labs.            Hospital Course  Brief HPI:  80 y.o. female with PMHX vascular dementia, dysphagia, HTN/HLD, T2DM (4/2025 8.4%) admitted for acute metabolic encephalopathy 2/2 poor intake and incidental infrarenal thrombus.      noted acute change in speech, sudden weakness, and numbness which prompted him to bring her to the ED.         ED Course:  Patient noted to be febrile at 101.5 degrees F and tachycardic at .  Labs most significant for mild leukocytosis with neutrophilia, thrombocytopenia, hypokalemia, and elevated lactate.  UA showed negative for leuk esterase/nitrite.  Troponin and EKG were unremarkable.  Negative COVID/flu/RSV.  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  Otherwise, CXR, CT head, and US DVT BLE negative.  Patient received 1L NS bolus, Vanco/Zosyn, and potassium and magnesium repletion.  Endovascular cardiology was consulted, who recommended to start on heparin drip.      Floors:  Patient remained afebrile for remainder of the course.  Mentation appeared to return to her baseline after receiving fluids, electrolyte repletion, and nutrition.  Endovascular cardiology recommended therapeutic AC and to follow-up outpatient for surveillance ultrasound. Heparin gtt was stopped and bridging with Lovenox to warfarin was started.  Hospice was consulted, for which discussion was had with patient, spouse, and son who agreed to change code status to DNR/DNI. Hospice meeting was held and patient and spouse declined, opting for continued home care. Patient is medically stable for discharge home with  warfarin, referral to coumadin clinic, and recommendations to follow up with Endovascular cardiology and follow up ultrasound.     Pertinent Physical Exam At Time of Discharge  Physical Exam  Constitutional:       Appearance: Normal appearance.   Cardiovascular:      Rate and Rhythm: Normal rate and regular rhythm.      Pulses: Normal pulses.      Heart sounds: Normal heart sounds.   Pulmonary:      Effort: Pulmonary effort is normal.      Breath sounds: Normal breath sounds.   Abdominal:      Palpations: Abdomen is soft.      Tenderness: There is no abdominal tenderness. There is no guarding or rebound.   Musculoskeletal:      Right lower leg: No edema.      Left lower leg: No edema.   Skin:     General: Skin is warm and dry.   Neurological:      General: No focal deficit present.      Mental Status: She is alert. Mental status is at baseline.   Psychiatric:         Mood and Affect: Mood normal.         Behavior: Behavior normal.         Outpatient Follow-Up  Future Appointments   Date Time Provider Department [place]   5/22/2025  8:30 AM NISHI Baker [id number] [facility]   5/27/2025 10:00 AM GEA VASC 1 GEAVSC [facility]   6/2/2025 11:30 AM Bhavana Reyna PA-C [id number] [facility]   6/2/2025  2:15 PM Amna Knight PA-C [id number] [facility]   7/30/2025  9:00 AM TRICIA Haskins [id number] [facility]   10/13/2025  1:20 PM TRICIA Haskins [id number] [facility]         Juantia Tenorio DO

## 2025-05-12 NOTE — PROGRESS NOTES
05/12/25 1446   Discharge Planning   Living Arrangements Spouse/significant other   Support Systems Spouse/significant other;Children   Assistance Needed Baseline A&Ox1, assistance with ADLs, utilizes a walker. Active with [facility](SN/PT). Spouse drives. Room air at baseline. PCP Maryann Marinelli APRN-CNS   Type of Residence Private residence   Number of Stairs to Enter Residence 3   Number of Stairs Within Residence 0   Do you have animals or pets at home? No   Home or Post Acute Services In home services   Type of Home Care Services Home PT;Home nursing visits   Expected Discharge Disposition Home Health  (Active with [facility]---resume services with the addition of SW. Confirmed SOC for 5/13 for lovenox injection- also confirmed agency can accomodate for twice weekly INR checks but no schedule for checks given. Provider aware and continues with DC.)   Patient Choice   Provider Choice list and CMS website (https://medicare.gov/care-compare#search) for post-acute Quality and Resource Measure Data were provided and reviewed with: Family   Patient / Family choosing to utilize agency / facility established prior to hospitalization Yes   Stroke Family Assessment   Stroke Family Assessment Needed No   Intensity of Service   Intensity of Service 0-30 min        05/12/25 1453   Discharge Planning   Does the patient need discharge transport arranged? Yes   RoundTrip coordination needed? Yes   Has discharge transport been arranged? Yes   What day is the transport expected? 05/12/25   What time is the transport expected? 1600

## 2025-05-12 NOTE — PROGRESS NOTES
Physical Therapy    Physical Therapy Treatment    Patient Name: Inge Clark  MRN: [id number]  Department: [address]  Room: [address] [place] [address]  Today's Date: 5/12/2025  Time Calculation  Start Time: 1152  Stop Time: 1202  Time Calculation (min): 10 min         Assessment/Plan   PT Assessment  PT Assessment Results: Decreased endurance, Impaired balance, Decreased mobility, Decreased cognition  Rehab Prognosis: Fair  Barriers to Discharge Home: Cognition needs, Physical needs  Cognition Needs: 24hr supervision for safety awareness needed  Physical Needs: Stair navigation into home limited by function/safety, High falls risk due to function or environment, 24hr mobility assistance needed, 24hr ADL assistance needed  Evaluation/Treatment Tolerance: Patient limited by fatigue  Medical Staff Made Aware: Yes  Strengths: Support of Caregivers  Barriers to Participation: Ability to acquire knowledge, Comorbidities, Housing layout, Insight into problems  End of Session Communication: Bedside nurse  Assessment Comment: Patient more alert and engaged on this date however remains AO x 1, demonstrates difficulty following instructions and remaining on task. Rec LOW intensity PT intervention.  End of Session Patient Position: Up in chair, Alarm on ( at bedside)  PT Plan  Inpatient/Swing Bed or Outpatient: Inpatient  PT Plan  Treatment/Interventions: Bed mobility, Transfer training, Gait training, Stair training, Balance training, Strengthening, Endurance training, Therapeutic exercise  PT Plan: Ongoing PT  PT Frequency: 2 times per week  PT Discharge Recommendations: Low intensity level of continued care  Equipment Recommended upon Discharge: Wheeled walker  PT Recommended Transfer Status: Assist x1  PT - OK to Discharge: Yes (per PT POC)      General Visit Information:   PT  Visit  PT Received On: 05/12/25  Response to Previous Treatment: Patient with no complaints from previous session.  General  Reason for Referral: Impaired  functional mobility; metabolic encephalopathy  Referred By: Keesha Dorado MD  Past Medical History Relevant to Rehab: Vascular dementia, dysphagia, HTN/HLD, T2DM, osteoarthritis  Family/Caregiver Present: Yes  Co-Treatment: OT  Co-Treatment Reason: Maximize patients functional mobility and safety  Prior to Session Communication: Bedside nurse  Patient Position Received: Bed, 3 rail up, Alarm on  General Comment: Patient pleasant, cooperative and agreeable to therapy tx    Subjective   Precautions:  Precautions  Medical Precautions: Fall precautions (ramos brody)            Objective   Pain:  Pain Assessment  Pain Assessment: 0-10  0-10 (Numeric) Pain Score: 0 - No pain  Cognition:  Cognition  Overall Cognitive Status: Impaired  Orientation Level: Disoriented to place, Disoriented to time, Disoriented to situation  Coordination:  Movements are Fluid and Coordinated: Yes  Postural Control:  Postural Control  Postural Control: Within Functional Limits  Static Sitting Balance  Static Sitting-Balance Support: Bilateral upper extremity supported, Feet supported  Static Sitting-Level of Assistance: Minimum assistance  Static Sitting-Comment/Number of Minutes: Retropulsive  Static Standing Balance  Static Standing-Balance Support: Bilateral upper extremity supported  Static Standing-Level of Assistance: Contact guard (x 2)  Dynamic Standing Balance  Dynamic Standing-Balance Support: Bilateral upper extremity supported  Dynamic Standing-Level of Assistance: Contact guard (x 2)  Dynamic Standing-Balance: Turning    Activity Tolerance:  Activity Tolerance  Endurance: Tolerates 10 - 20 min exercise with multiple rests  Treatments:    Bed Mobility  Bed Mobility: Yes  Bed Mobility 1  Bed Mobility 1: Supine to sitting  Level of Assistance 1: Minimum assistance, Minimal tactile cues, Maximum verbal cues    Ambulation/Gait Training  Ambulation/Gait Training Performed: Yes  Ambulation/Gait Training 1  Surface 1: Level  tile  Device 1: No device  Assistance 1: Hand held assistance (x 2)  Quality of Gait 1: Decreased step length  Comments/Distance (ft) 1: 5' (bed>chair)  Transfers  Transfer: Yes  Transfer 1  Transfer From 1: Sit to, Stand to  Transfer to 1: Sit, Stand  Technique 1: Sit to stand, Stand to sit  Transfer Level of Assistance 1: Hand held assistance (x 2)    Outcome Measures:  [facility] Basic Mobility  Turning from your back to your side while in a flat bed without using bedrails: A little  Moving from lying on your back to sitting on the side of a flat bed without using bedrails: A little  Moving to and from bed to chair (including a wheelchair): A little  Standing up from a chair using your arms (e.g. wheelchair or bedside chair): A little  To walk in hospital room: A little  Climbing 3-5 steps with railing: A little  Basic Mobility - Total Score: 18    Education Documentation  Mobility Training, taught by Ely Ruiz, PT at 5/12/2025 12:13 PM.  Learner: Significant Other, Patient  Readiness: Acceptance  Method: Explanation  Response: Verbalizes Understanding  Comment: Importance of continued functional mobility, staff assist, up in chair for meals    Education Comments  No comments found.        OP EDUCATION:       Encounter Problems       Encounter Problems (Active)       Mobility       STG - Patient will ambulate with walker 50' CGA  (Progressing)       Start:  05/07/25    Expected End:  05/21/25            STG - Patient will ascend and descend four stairs B rails CGA  (Progressing)       Start:  05/07/25    Expected End:  05/21/25               PT Transfers       STG - Patient will perform bed mobility independently  (Progressing)       Start:  05/07/25    Expected End:  05/21/25            STG - Patient will transfer sit to and from stand SBA  (Progressing)       Start:  05/07/25    Expected End:  05/21/25               Pain - Adult

## 2025-05-12 NOTE — PROGRESS NOTES
"Nutrition Follow Up Assessment:   Nutrition Assessment       Patient is a 80 y.o. female with h/o vascular dementia, now presenting with metabolic encephalopathy 2/2 dehydration.     Pt w/ good response to supplemental IVFs. Now back to baseline.     Family s/p informational hospice meeting (5/10), declined. Tentative plan for discharge home this afternoon.     Nutrition History:  Food and Nutrient History: Visit made, pt resting in bed eating lunch w/  at bedside.  notes she consumed 100% of breakfast today. Pt with 50% of lunch left during visit but was still eating.  notes she loves the Mighty Shakes & Magic Cups so will continue to send as ordered. No additional needs at this time.  notes pt anticipated for discharge this afternoon.  Food Allergy:  (None)     Anthropometrics:  Height: 160 cm (5' 3\")   Weight: 55.9 kg (123 lb 4.8 oz)   BMI (Calculated): 21.85  IBW/kg (Dietitian Calculated): 52.3 kg  Percent of IBW: 107 %    Weight History:   Weight         5/6/2025  1452 5/9/2025  0620          Weight: 56.2 kg (124 lb)   --> Estimated 55.9 kg (123 lb)   --> Bed scale       Weight Change %:  Weight History / % Weight Change: Pt only w/ x1 measured weight this admission. Unable to assess for changes.    Nutrition Focused Physical Exam Findings:  Defer: See RDN assessment (5/7/25)    Edema:  Edema: none    Physical Findings:  Skin: Positive (Unspecified R lower ? wound, R upper flank wound, & Coccyx wound)  Digestive System Findings:  (None)  Mouth Findings:  (None)    Nutrition Significant Labs:  BMP Trend:   Results from last 7 days   Lab Units 05/12/25  0617 05/11/25  0613 05/10/25  0638 05/09/25  0714   GLUCOSE mg/dL 224* 195* 215* 188*   CALCIUM mg/dL 8.8 8.8 8.8 8.8   SODIUM mmol/L 133* 133* 133* 134*   POTASSIUM mmol/L 4.3 4.3 4.2 4.1   CO2 mmol/L 26 26 24 26   CHLORIDE mmol/L 98 99 99 97*   BUN mg/dL 19 16 13 9   CREATININE mg/dL 0.72 0.73 0.70 0.61      Nutrition Specific " Medications:  Scheduled medications  Scheduled Medications[1]  Continuous medications  Continuous Medications[2]  PRN medications  PRN Medications[3]    I/O:   Last BM Date: 05/07/25; Stool Appearance: Loose (05/07/25 1106)    Dietary Orders (From admission, onward)       Start     Ordered    05/07/25 1603  Oral nutritional supplements  Until discontinued        Question Answer Comment   Deliver with Lunch    Select supplement: Magic Cup        05/07/25 1603    05/07/25 1603  Oral nutritional supplements  Until discontinued        Question Answer Comment   Deliver with Breakfast    Deliver with Dinner    Select supplement: Mighty Shake        05/07/25 1603 05/06/25 2316  May Not Participate in Room Service  ( ROOM SERVICE MAY NOT PARTICIPATE)  Once        Question:  .  Answer:  Yes    05/06/25 2315 05/06/25 2253  Adult diet Consistent Carb; CCD 60 gm/meal; Soft and bite sized 6; 1:1 Feeding  Diet effective now        Question Answer Comment   Diet type Consistent Carb    Carb diet selection: CCD 60 gm/meal    Texture Soft and bite sized 6    Select tray type: 1:1 Feeding        05/06/25 2253             Estimated Needs:   Total Energy Estimated Needs in 24 hours (kCal):  (0053-9632)  Method for Estimating Needs: 28-32 kcals/kg x IBW  Total Protein Estimated Needs in 24 Hours (g):  (60+)  Method for Estimating 24 Hour Protein Needs: ~1.2 g/kg x IBW  Total Fluid Estimated Needs in 24 Hours (mL):  (6181-3878)  Method for Estimating 24 Hour Fluid Needs: 1mL/kcal or per team        Nutrition Diagnosis   Malnutrition Diagnosis  Patient has Malnutrition Diagnosis:  (Unable to accurately assess given pt is a poor historian & limited information available at time of assessment.)    Nutrition Diagnosis  Patient has Nutrition Diagnosis: Yes  Diagnosis Status (1): Active  Nutrition Diagnosis 1: Inadequate oral intake  Related to (1): dementia c/b metabolic encephalopathy  As Evidenced by (1): consuming variable 0-100%  of meals this admission       Nutrition Interventions/Recommendations      Nutrition Recommendations:  Per SLP's most recent recommendations, consider liberalizing to a regular diet w/ thin liquids    Nutrition Interventions/Goals:   Medical Food Supplement: Commercial beverage medical food supplement therapy  Goal: Mighty Shake BID (220 kcals/6g protein each) + Magic Cup daily (290 kcals/9g protein each)      Nutrition Monitoring and Evaluation   Food/Nutrient Related History Monitoring  Monitoring and Evaluation Plan: Estimated Energy Intake  Estimated Energy Intake: Energy intake greater or equal to 75% of estimated energy needs    Time Spent (min): 60 minutes            [1] atenolol, 50 mg, oral, Daily  atorvastatin, 40 mg, oral, Daily  docusate sodium, 100 mg, oral, Daily  donepezil, 10 mg, oral, Nightly  enoxaparin, 1.5 mg/kg, subcutaneous, q24h  [Held by provider] glipiZIDE, 2.5 mg, oral, Daily  insulin lispro, 0-15 Units, subcutaneous, Before meals & nightly  mirtazapine, 15 mg, oral, Nightly  potassium chloride CR, 20 mEq, oral, Daily  warfarin, 7.5 mg, oral, Once  [2]    [3] PRN medications: acetaminophen **OR** acetaminophen **OR** acetaminophen, bisacodyl, melatonin

## 2025-05-12 NOTE — PROGRESS NOTES
05/12/25 1400   Referral Data   Referral Source Other (Comment)   Referral To   [facility] [employer] [facility]  (Added transportation information to the AVS per family request.)

## 2025-05-12 NOTE — PROGRESS NOTES
Occupational Therapy    Occupational Therapy Treatment    Name: Inge Clark  MRN: [id number]  Department: [address]  Room: [address] [place] [address]  Date: 05/12/25  Time Calculation  Start Time: 1151  Stop Time: 1202  Time Calculation (min): 11 min    Assessment:  OT Assessment: Pt is a 81 yo F referred to occupational therapy for impaired self-care and functional mobility 2/2 hospitalization for metabolic encephalopathy. Pt presented to [facility] w/ AMS. Pt demonstrates some progress towards goals this date completing bed mobility w/ min A and transfer to chair w/ min A and hand held assist. Pt continues to require max VCs to complete tasks 2/2 difficulty command following. Pt continues to require 24 hour supervision for safety. Pt would continue to benefit from OT services at the LOW intensity level to increase safety and functional independence.  Prognosis: Good  Barriers to Discharge Home: No anticipated barriers  Evaluation/Treatment Tolerance: Patient tolerated treatment well  Medical Staff Made Aware: Yes  End of Session Communication: Bedside nurse  End of Session Patient Position: Up in chair, Alarm on  Plan:  Treatment Interventions: ADL retraining, Functional transfer training, UE strengthening/ROM, Endurance training, Patient/family training, Equipment evaluation/education, Compensatory technique education  OT Frequency: 2 times per week  OT Discharge Recommendations: Low intensity level of continued care  Equipment Recommended upon Discharge: Wheeled walker  OT Recommended Transfer Status: Stand by assist, Assist of 1  OT - OK to Discharge: Yes(per OT POC)    Subjective   Previous Visit Info:  OT Last Visit  OT Received On: 05/12/25  General:  General  Reason for Referral: Pt is a 81 yo F referred to occupational therapy for impaired self-care and functional mobility 2/2 hospitalization for metabolic encephalopathy  Referred By: Keesha Dorado MD  Past Medical History Relevant to Rehab: Vascular dementia, dysphagia,  HTN/HLD, T2DM, osteoarthritis  Family/Caregiver Present: Yes (Spouse present)  Co-Treatment: PT  Co-Treatment Reason: To maximize pt safety and outcomes  Prior to Session Communication: Bedside nurse  Patient Position Received: Bed, 3 rail up, Alarm on  Preferred Learning Style: visual, kinesthetic  General Comment: Pt pleasantly confused and agreeable to therapy session. Pt cleared by RN prior to session.  Precautions:  Medical Precautions: Fall precautions, Swallowing precautions  Precautions Comment: ramos serra purewick    Pain Assessment:  Pain Assessment  Pain Assessment: 0-10  0-10 (Numeric) Pain Score: 0 - No pain    Objective   Cognition:  Overall Cognitive Status: Impaired at baseline  Arousal/Alertness: Delayed responses to stimuli  Orientation Level: Disoriented to place, Disoriented to time, Disoriented to situation  Memory: Exceptions to WFL  Insight: Severe  Impulsive: Mildly    Bed Mobility/Transfers:   Bed Mobility  Bed Mobility: Yes  Bed Mobility 1  Bed Mobility 1: Supine to sitting  Level of Assistance 1: Minimum assistance, Minimal tactile cues, Maximum verbal cues  Bed Mobility Comments 1: Pt required max VCs to follow instructions and complete task    Transfers  Transfer: Yes  Transfer 1  Transfer From 1: Bed to  Transfer to 1: Stand  Technique 1: Sit to stand, Stand to sit  Transfer Level of Assistance 1: Minimum assistance, Hand held assistance, +2  Trials/Comments 1: Max VCs for sequencing, pt able to take about 5 steps from EOB to chair w/ min A/HHA    Sitting Balance:  Static Sitting Balance  Static Sitting-Balance Support: Feet supported  Static Sitting-Level of Assistance: Contact guard  Dynamic Sitting Balance  Dynamic Sitting-Balance Support: Feet supported  Dynamic Sitting-Level of Assistance: Contact guard  Dynamic Sitting-Balance: Forward lean  Standing Balance:  Static Standing Balance  Static Standing-Balance Support: Bilateral upper extremity supported  Static Standing-Level of  Assistance: Contact guard  Dynamic Standing Balance  Dynamic Standing-Balance Support: Bilateral upper extremity supported  Dynamic Standing-Level of Assistance: Contact guard  Dynamic Standing-Balance: Turning    Strength:  Strength  Strength Comments: BUE functionally 4-/5  RUE: Within Functional Limits   LUE: Within Functional Limits    Outcome Measures:  [facility] Daily Activity  Putting on and taking off regular lower body clothing: A lot  Bathing (including washing, rinsing, drying): A lot  Putting on and taking off regular upper body clothing: A lot  Toileting, which includes using toilet, bedpan or urinal: A lot  Taking care of personal grooming such as brushing teeth: A little  Eating Meals: A little  Daily Activity - Total Score: 14    Education Documentation  Body Mechanics, taught by Emely Singh OT at 5/12/2025 12:19 PM.  Learner: Patient  Readiness: Acceptance  Method: Explanation  Response: Verbalizes Understanding  Comment: Pt educated on POC, DC recs, safety and body mechanics when completing transfers and ADLs    ADL Training, taught by Emely Singh OT at 5/12/2025 12:19 PM.  Learner: Patient  Readiness: Acceptance  Method: Explanation  Response: Verbalizes Understanding  Comment: Pt educated on POC, DC recs, safety and body mechanics when completing transfers and ADLs    Goals:  Encounter Problems       Encounter Problems (Active)       ADLs       Patient will perform UB and LB bathing with minimal assist  level of assistance and PRN bathroom equipment. (Progressing)       Start:  05/07/25    Expected End:  05/21/25            Patient will complete daily grooming tasks with minimal assist  level of assistance and PRN adaptive equipment while supported sitting. (Progressing)       Start:  05/07/25    Expected End:  05/21/25            Patient will complete toileting including hygiene clothing management/hygiene with minimal assist  level of assistance and PRN bathroom equipment. (Progressing)        Start:  05/07/25    Expected End:  05/21/25               BALANCE       Pt will maintain static/dynamic standing balance during ADL task with contact guard assist level of assistance in order to demonstrate decreased risk of falling and improved postural control. (Progressing)       Start:  05/07/25    Expected End:  05/21/25               TRANSFERS       Patient will perform bed mobility stand by assist level of assistance and bed rails in order to improve safety and independence with mobility (Progressing)       Start:  05/07/25    Expected End:  05/21/25            Patient will complete functional transfer to BSC/chair with least restrictive device with stand by assist level of assistance. (Progressing)       Start:  05/07/25    Expected End:  05/21/25

## 2025-05-12 NOTE — DISCHARGE INSTRUCTIONS
Please, take your home medications as instructed after being discharged from the hospital.     NEW MEDICATIONS:  Warfarin 7.5 mg once in the evening. Please take this dose TODAY only  Warfarin 5 mg daily in the evening, start this tomorrow.  Lovenox shot once tomorrow in the morning (should be performed by home health nurses)  Colace 100 mg daily    OTHER INSTRUCTIONS:  Continue home medications as usual.      UPCOMING APPOINTMENTS:  Please follow up with Endovascular cardiology, Dr. Ferris for follow up ultrasound  Referred to [facility] for close monitoring.     Please, follow-up with your primary care provider within 7 to 14 days after leaving the hospital. /appointment services has been requested to make an appointment for you, however if you do not hear back from them within 1 to 2 days, please call your primary physician's office to schedule an appointment. Bring your photo ID and insurance card to your appointment.   [facility]  services can be reached at [phone].     If you experience any worsening symptoms or have any concerns, please contact your primary care provider to schedule an appointment. If you cannot get in touch with your primary care physician, please return to the nearest emergency room or urgent care clinic for an evaluation and treatment.     Thank you for choosing [facility] and allowing us to partake in your medical care!     - Your [facility] inpatient primary care team.

## 2025-05-13 ENCOUNTER — PATIENT OUTREACH (OUTPATIENT)
Dept: PRIMARY CARE | Facility: CLINIC | Age: 80
End: 2025-05-13
Payer: MEDICARE

## 2025-05-13 DIAGNOSIS — I82.90 THROMBUS: ICD-10-CM

## 2025-05-13 NOTE — PROGRESS NOTES
Discharge Facility: [facility]   Discharge Diagnosis: Metabolic encephalopathy 2/2 failure to thrive  Vascular dementia  IVC/Infrarenal thrombus  Admission Date: 5/6/25  Discharge Date: 5/12/25    PCP Appointment Date: Message routed to office for scheduling     Specialist Appointment Date: GERIATRIC MEDICINE ESTABLISHED PATIENT with Elsa Ball Thursday May 22, 2025 8:30 AM  Hospital Encounter and Summary Linked:  ED to Hosp-Admission (Discharged) with Justin Casillas DO; Marek Chance MD (05/06/2025)    Discharge Summary by Juanita Tenorio DO (05/12/2025 11:55)   See discharge assessment below for further details  Wrap Up  Wrap Up Additional Comments: Pt.  is pt. Main caregiver, he reports to having a difficult time taking care of her. He does have support from a family friend, his son and daughter. They are looking into options of what to do next.  reports that pt. Is unable to walk. She is bedridden. This caller asked the  if he felt safe with the pt. At home, and he reports he does not. It took him 1 hour to do a bed change on her. He had a hard time helping her sit up for breakfast. This nurse was given permission to call pt. Daughter Dylon to update on the pt. Condition at home. LVM at this time. Pt.  states that he will continue to try to contact his son and daughter to update them on the pt. Condition.  would like for the pt. To return to ED. Denies further questions/concerns at this time. This callers contact information for non-emergent questions/concerns. (5/13/2025 11:50 AM)    Engagement  Call Start Time: -- (Call completed with pt.  Deacon) (5/13/2025 11:50 AM)    Medications  Medications reviewed with patient/caregiver?: Yes (5/13/2025 11:50 AM)  Is the patient having any side effects they believe may be caused by any medication additions or changes?: No (5/13/2025 11:50 AM)  Does the patient have all medications ordered at discharge?: Yes (5/13/2025 11:50  AM)  Care Management Interventions: No intervention needed (5/13/2025 11:50 AM)  Prescription Comments: START taking: enoxaparin (Lovenox) Start taking on: May 13, 2025 warfarin (Coumadin) This medication has multiple start dates; refer to the medication list below for more details. CHANGE how you take: docusate sodium (Colace) (5/13/2025 11:50 AM)  Is the patient taking all medications as directed (includes completed medication regime)?: Yes (5/13/2025 11:50 AM)  Care Management Interventions: Provided patient education (5/13/2025 11:50 AM)  Medication Comments: Pt.  mixes meds with applesauce. (5/13/2025 11:50 AM)    Appointments  Does the patient have a primary care provider?: Yes (5/13/2025 11:50 AM)  Care Management Interventions: Advised patient to make appointment (5/13/2025 11:50 AM)  Has the patient kept scheduled appointments due by today?: Yes (5/13/2025 11:50 AM)  Care Management Interventions: Educated on importance of keeping appointment (5/13/2025 11:50 AM)    Self Management  What is the home health agency?: [facility] (5/13/2025 11:50 AM)  Has home health visited the patient within 72 hours of discharge?: Yes (5/13/2025 11:50 AM)  What Durable Medical Equipment (DME) was ordered?: Rolling walker- Pt. is unable to walk. (5/13/2025 11:50 AM)    Patient Teaching  Does the patient have access to their discharge instructions?: Yes (5/13/2025 11:50 AM)  Care Management Interventions: Reviewed instructions with patient (5/13/2025 11:50 AM)  What is the patient's perception of their health status since discharge?: Same (5/13/2025 11:50 AM)  Is the patient/caregiver able to teach back the hierarchy of who to call/visit for symptoms/problems? PCP, Specialist, Home Health nurse, Urgent Care, ED, 911: Yes (5/13/2025 11:50 AM)  Patient/Caregiver Education Comments: Pt.  reports that he is having a difficult time taking care of pt. at home. (5/13/2025 11:50 AM)    1159:  Daughter Dylon called back  at this time and confirmed that family is trying to find  placement for the pt.

## 2025-05-15 LAB
ATRIAL RATE: 92 BPM
P AXIS: 19 DEGREES
P OFFSET: 187 MS
P ONSET: 143 MS
PR INTERVAL: 144 MS
Q ONSET: 215 MS
QRS COUNT: 15 BEATS
QRS DURATION: 82 MS
QT INTERVAL: 380 MS
QTC CALCULATION(BAZETT): 469 MS
QTC FREDERICIA: 438 MS
R AXIS: 51 DEGREES
T AXIS: 25 DEGREES
T OFFSET: 405 MS
VENTRICULAR RATE: 92 BPM

## 2025-05-15 NOTE — SIGNIFICANT EVENT
Follow Up Phone Call    Outgoing phone call    Spoke to: Inge Clark Relationship:spouse   Phone number: [phone]      Outcome: contacted patient/ family   Chief Complaint   Patient presents with    Altered Mental Status          Diagnosis:Not applicable    Spouse states she is not very well. He has a meeting about her ongoing care.

## 2025-05-16 ENCOUNTER — TELEPHONE (OUTPATIENT)
Dept: PRIMARY CARE | Facility: CLINIC | Age: 80
End: 2025-05-16
Payer: MEDICARE

## 2025-05-16 NOTE — TELEPHONE ENCOUNTER
Carolina from [facility] called to see if we will follow orders for ot for a couple weeks please advise and call Carolina at [phone]

## 2025-05-16 NOTE — TELEPHONE ENCOUNTER
OK for Home OT orders, but again recommend if patient is not about to be taken care of in home recommending hospital evaluation for Nursing Home placement. Thank you!

## 2025-05-16 NOTE — TELEPHONE ENCOUNTER
Patient was to follow with [facility] for [facility] but isn't able to show up for the appointment due to being Bed Bound, see other message regarding her care. Recommended hospital evaluation for NH Placement.

## 2025-05-16 NOTE — TELEPHONE ENCOUNTER
Patient was evaluated yesterday for PT and her dementia has really progressed.  can not take care of her at [facility] with his parkinsons. Family is looking for 24 hour nursing home care. Her BP was 152/94 he recommended her going to the hospital to seek admission to the nursing home. He states she is bed bound.

## 2025-05-21 ENCOUNTER — APPOINTMENT (OUTPATIENT)
Dept: RADIOLOGY | Facility: HOSPITAL | Age: 80
End: 2025-05-21
Payer: MEDICARE

## 2025-05-21 ENCOUNTER — HOSPITAL ENCOUNTER (OUTPATIENT)
Facility: HOSPITAL | Age: 80
Setting detail: OBSERVATION
Discharge: NURSING, REHAB OR LONG-TERM CARE | End: 2025-05-23
Attending: STUDENT IN AN ORGANIZED HEALTH CARE EDUCATION/TRAINING PROGRAM | Admitting: INTERNAL MEDICINE
Payer: MEDICARE

## 2025-05-21 ENCOUNTER — APPOINTMENT (OUTPATIENT)
Dept: CARDIOLOGY | Facility: HOSPITAL | Age: 80
End: 2025-05-21
Payer: MEDICARE

## 2025-05-21 DIAGNOSIS — R50.9 FEBRILE ILLNESS, ACUTE: ICD-10-CM

## 2025-05-21 DIAGNOSIS — I82.220: ICD-10-CM

## 2025-05-21 DIAGNOSIS — N39.0 LOWER URINARY TRACT INFECTIOUS DISEASE: Primary | ICD-10-CM

## 2025-05-21 DIAGNOSIS — K59.00 CONSTIPATION, UNSPECIFIED CONSTIPATION TYPE: ICD-10-CM

## 2025-05-21 DIAGNOSIS — E83.42 HYPOMAGNESEMIA: ICD-10-CM

## 2025-05-21 DIAGNOSIS — I82.90 THROMBUS: ICD-10-CM

## 2025-05-21 DIAGNOSIS — R50.9 FEBRILE ILLNESS: ICD-10-CM

## 2025-05-21 DIAGNOSIS — L89.156 PRESSURE INJURY OF DEEP TISSUE OF SACRAL REGION: ICD-10-CM

## 2025-05-21 DIAGNOSIS — I82.220 THROMBOSIS OF INFERIOR VENA CAVA (MULTI): ICD-10-CM

## 2025-05-21 PROBLEM — E11.8 COMPLICATION OF DIABETES MELLITUS (MULTI): Status: ACTIVE | Noted: 2025-04-16

## 2025-05-21 PROBLEM — R45.86 MOOD CHANGES: Status: ACTIVE | Noted: 2025-04-16

## 2025-05-21 PROBLEM — R41.841 COGNITIVE COMMUNICATION DISORDER: Status: ACTIVE | Noted: 2025-04-17

## 2025-05-21 PROBLEM — R10.9 ABDOMINAL PAIN: Status: ACTIVE | Noted: 2025-05-21

## 2025-05-21 PROBLEM — M47.812 CERVICAL SPONDYLOSIS WITHOUT MYELOPATHY: Status: ACTIVE | Noted: 2025-04-16

## 2025-05-21 LAB
ALBUMIN SERPL BCP-MCNC: 3.7 G/DL (ref 3.4–5)
ALP SERPL-CCNC: 103 U/L (ref 33–136)
ALT SERPL W P-5'-P-CCNC: 30 U/L (ref 7–45)
AMORPH CRY #/AREA UR COMP ASSIST: ABNORMAL /HPF
ANION GAP SERPL CALC-SCNC: 18 MMOL/L (ref 10–20)
APPEARANCE UR: ABNORMAL
APTT PPP: 40 SECONDS (ref 26–36)
AST SERPL W P-5'-P-CCNC: 28 U/L (ref 9–39)
BASOPHILS # BLD AUTO: 0.03 X10*3/UL (ref 0–0.1)
BASOPHILS NFR BLD AUTO: 0.2 %
BILIRUB SERPL-MCNC: 0.9 MG/DL (ref 0–1.2)
BILIRUB UR STRIP.AUTO-MCNC: NEGATIVE MG/DL
BUN SERPL-MCNC: 18 MG/DL (ref 6–23)
CALCIUM SERPL-MCNC: 9.4 MG/DL (ref 8.6–10.3)
CARDIAC TROPONIN I PNL SERPL HS: 5 NG/L (ref 0–13)
CHLORIDE SERPL-SCNC: 101 MMOL/L (ref 98–107)
CO2 SERPL-SCNC: 23 MMOL/L (ref 21–32)
COLOR UR: YELLOW
CREAT SERPL-MCNC: 0.73 MG/DL (ref 0.5–1.05)
EGFRCR SERPLBLD CKD-EPI 2021: 84 ML/MIN/1.73M*2
EOSINOPHIL # BLD AUTO: 0.03 X10*3/UL (ref 0–0.4)
EOSINOPHIL NFR BLD AUTO: 0.2 %
ERYTHROCYTE [DISTWIDTH] IN BLOOD BY AUTOMATED COUNT: 14.3 % (ref 11.5–14.5)
FLUAV RNA RESP QL NAA+PROBE: NOT DETECTED
FLUBV RNA RESP QL NAA+PROBE: NOT DETECTED
GLUCOSE BLD MANUAL STRIP-MCNC: 195 MG/DL (ref 74–99)
GLUCOSE SERPL-MCNC: 184 MG/DL (ref 74–99)
GLUCOSE UR STRIP.AUTO-MCNC: ABNORMAL MG/DL
HCT VFR BLD AUTO: 37.8 % (ref 36–46)
HGB BLD-MCNC: 12.6 G/DL (ref 12–16)
HYALINE CASTS #/AREA URNS AUTO: ABNORMAL /LPF
IMM GRANULOCYTES # BLD AUTO: 0.07 X10*3/UL (ref 0–0.5)
IMM GRANULOCYTES NFR BLD AUTO: 0.5 % (ref 0–0.9)
INR PPP: 4.1 (ref 0.9–1.1)
KETONES UR STRIP.AUTO-MCNC: ABNORMAL MG/DL
LACTATE SERPL-SCNC: 1.9 MMOL/L (ref 0.4–2)
LEUKOCYTE ESTERASE UR QL STRIP.AUTO: ABNORMAL
LYMPHOCYTES # BLD AUTO: 1.28 X10*3/UL (ref 0.8–3)
LYMPHOCYTES NFR BLD AUTO: 8.5 %
MAGNESIUM SERPL-MCNC: 1.32 MG/DL (ref 1.6–2.4)
MAGNESIUM SERPL-MCNC: 1.64 MG/DL (ref 1.6–2.4)
MCH RBC QN AUTO: 29.9 PG (ref 26–34)
MCHC RBC AUTO-ENTMCNC: 33.3 G/DL (ref 32–36)
MCV RBC AUTO: 90 FL (ref 80–100)
MONOCYTES # BLD AUTO: 0.69 X10*3/UL (ref 0.05–0.8)
MONOCYTES NFR BLD AUTO: 4.6 %
MUCOUS THREADS #/AREA URNS AUTO: ABNORMAL /LPF
NEUTROPHILS # BLD AUTO: 12.88 X10*3/UL (ref 1.6–5.5)
NEUTROPHILS NFR BLD AUTO: 86 %
NITRITE UR QL STRIP.AUTO: NEGATIVE
NRBC BLD-RTO: 0 /100 WBCS (ref 0–0)
PH UR STRIP.AUTO: 6 [PH]
PLATELET # BLD AUTO: 100 X10*3/UL (ref 150–450)
POTASSIUM SERPL-SCNC: 3.6 MMOL/L (ref 3.5–5.3)
POTASSIUM SERPL-SCNC: 6.1 MMOL/L (ref 3.5–5.3)
PROT SERPL-MCNC: 7.7 G/DL (ref 6.4–8.2)
PROT UR STRIP.AUTO-MCNC: ABNORMAL MG/DL
PROTHROMBIN TIME: 45 SECONDS (ref 9.8–12.4)
RBC # BLD AUTO: 4.22 X10*6/UL (ref 4–5.2)
RBC # UR STRIP.AUTO: ABNORMAL MG/DL
RBC #/AREA URNS AUTO: ABNORMAL /HPF
SARS-COV-2 RNA RESP QL NAA+PROBE: NOT DETECTED
SODIUM SERPL-SCNC: 136 MMOL/L (ref 136–145)
SP GR UR STRIP.AUTO: 1.02
SQUAMOUS #/AREA URNS AUTO: ABNORMAL /HPF
UFH PPP CHRO-ACNC: 0.1 IU/ML (ref ?–1.1)
UROBILINOGEN UR STRIP.AUTO-MCNC: NORMAL MG/DL
WBC # BLD AUTO: 15 X10*3/UL (ref 4.4–11.3)
WBC #/AREA URNS AUTO: ABNORMAL /HPF

## 2025-05-21 PROCEDURE — 70450 CT HEAD/BRAIN W/O DYE: CPT | Performed by: RADIOLOGY

## 2025-05-21 PROCEDURE — 94760 N-INVAS EAR/PLS OXIMETRY 1: CPT

## 2025-05-21 PROCEDURE — 87040 BLOOD CULTURE FOR BACTERIA: CPT | Mod: GEALAB | Performed by: NURSE PRACTITIONER

## 2025-05-21 PROCEDURE — 2500000001 HC RX 250 WO HCPCS SELF ADMINISTERED DRUGS (ALT 637 FOR MEDICARE OP): Performed by: BEHAVIOR TECHNICIAN

## 2025-05-21 PROCEDURE — 85025 COMPLETE CBC W/AUTO DIFF WBC: CPT | Performed by: STUDENT IN AN ORGANIZED HEALTH CARE EDUCATION/TRAINING PROGRAM

## 2025-05-21 PROCEDURE — 2500000004 HC RX 250 GENERAL PHARMACY W/ HCPCS (ALT 636 FOR OP/ED): Mod: JZ | Performed by: NURSE PRACTITIONER

## 2025-05-21 PROCEDURE — 96367 TX/PROPH/DG ADDL SEQ IV INF: CPT | Mod: 59

## 2025-05-21 PROCEDURE — 81001 URINALYSIS AUTO W/SCOPE: CPT | Performed by: NURSE PRACTITIONER

## 2025-05-21 PROCEDURE — 71045 X-RAY EXAM CHEST 1 VIEW: CPT

## 2025-05-21 PROCEDURE — 93970 EXTREMITY STUDY: CPT | Performed by: RADIOLOGY

## 2025-05-21 PROCEDURE — 2550000001 HC RX 255 CONTRASTS: Performed by: STUDENT IN AN ORGANIZED HEALTH CARE EDUCATION/TRAINING PROGRAM

## 2025-05-21 PROCEDURE — 99285 EMERGENCY DEPT VISIT HI MDM: CPT | Mod: 25 | Performed by: STUDENT IN AN ORGANIZED HEALTH CARE EDUCATION/TRAINING PROGRAM

## 2025-05-21 PROCEDURE — 85520 HEPARIN ASSAY: CPT | Performed by: STUDENT IN AN ORGANIZED HEALTH CARE EDUCATION/TRAINING PROGRAM

## 2025-05-21 PROCEDURE — G0378 HOSPITAL OBSERVATION PER HR: HCPCS

## 2025-05-21 PROCEDURE — 36415 COLL VENOUS BLD VENIPUNCTURE: CPT | Performed by: EMERGENCY MEDICINE

## 2025-05-21 PROCEDURE — 80053 COMPREHEN METABOLIC PANEL: CPT | Performed by: STUDENT IN AN ORGANIZED HEALTH CARE EDUCATION/TRAINING PROGRAM

## 2025-05-21 PROCEDURE — 70450 CT HEAD/BRAIN W/O DYE: CPT

## 2025-05-21 PROCEDURE — 85025 COMPLETE CBC W/AUTO DIFF WBC: CPT | Performed by: NURSE PRACTITIONER

## 2025-05-21 PROCEDURE — 82947 ASSAY GLUCOSE BLOOD QUANT: CPT

## 2025-05-21 PROCEDURE — 93970 EXTREMITY STUDY: CPT

## 2025-05-21 PROCEDURE — 2500000002 HC RX 250 W HCPCS SELF ADMINISTERED DRUGS (ALT 637 FOR MEDICARE OP, ALT 636 FOR OP/ED): Performed by: BEHAVIOR TECHNICIAN

## 2025-05-21 PROCEDURE — 85610 PROTHROMBIN TIME: CPT | Performed by: STUDENT IN AN ORGANIZED HEALTH CARE EDUCATION/TRAINING PROGRAM

## 2025-05-21 PROCEDURE — 2500000005 HC RX 250 GENERAL PHARMACY W/O HCPCS: Performed by: BEHAVIOR TECHNICIAN

## 2025-05-21 PROCEDURE — 87086 URINE CULTURE/COLONY COUNT: CPT | Mod: GEALAB | Performed by: NURSE PRACTITIONER

## 2025-05-21 PROCEDURE — 84132 ASSAY OF SERUM POTASSIUM: CPT | Performed by: NURSE PRACTITIONER

## 2025-05-21 PROCEDURE — 87075 CULTR BACTERIA EXCEPT BLOOD: CPT | Mod: 59,GEALAB | Performed by: NURSE PRACTITIONER

## 2025-05-21 PROCEDURE — 83735 ASSAY OF MAGNESIUM: CPT | Performed by: STUDENT IN AN ORGANIZED HEALTH CARE EDUCATION/TRAINING PROGRAM

## 2025-05-21 PROCEDURE — 2500000004 HC RX 250 GENERAL PHARMACY W/ HCPCS (ALT 636 FOR OP/ED): Performed by: STUDENT IN AN ORGANIZED HEALTH CARE EDUCATION/TRAINING PROGRAM

## 2025-05-21 PROCEDURE — 96365 THER/PROPH/DIAG IV INF INIT: CPT | Mod: 59

## 2025-05-21 PROCEDURE — 2500000002 HC RX 250 W HCPCS SELF ADMINISTERED DRUGS (ALT 637 FOR MEDICARE OP, ALT 636 FOR OP/ED)

## 2025-05-21 PROCEDURE — 84484 ASSAY OF TROPONIN QUANT: CPT | Performed by: STUDENT IN AN ORGANIZED HEALTH CARE EDUCATION/TRAINING PROGRAM

## 2025-05-21 PROCEDURE — 93005 ELECTROCARDIOGRAM TRACING: CPT

## 2025-05-21 PROCEDURE — 87636 SARSCOV2 & INF A&B AMP PRB: CPT | Performed by: NURSE PRACTITIONER

## 2025-05-21 PROCEDURE — 85730 THROMBOPLASTIN TIME PARTIAL: CPT | Performed by: STUDENT IN AN ORGANIZED HEALTH CARE EDUCATION/TRAINING PROGRAM

## 2025-05-21 PROCEDURE — 71045 X-RAY EXAM CHEST 1 VIEW: CPT | Performed by: RADIOLOGY

## 2025-05-21 PROCEDURE — 36415 COLL VENOUS BLD VENIPUNCTURE: CPT | Performed by: NURSE PRACTITIONER

## 2025-05-21 PROCEDURE — 96366 THER/PROPH/DIAG IV INF ADDON: CPT | Mod: 59

## 2025-05-21 PROCEDURE — 2500000001 HC RX 250 WO HCPCS SELF ADMINISTERED DRUGS (ALT 637 FOR MEDICARE OP)

## 2025-05-21 PROCEDURE — 83605 ASSAY OF LACTIC ACID: CPT | Performed by: NURSE PRACTITIONER

## 2025-05-21 PROCEDURE — 74177 CT ABD & PELVIS W/CONTRAST: CPT | Performed by: RADIOLOGY

## 2025-05-21 PROCEDURE — 74177 CT ABD & PELVIS W/CONTRAST: CPT

## 2025-05-21 PROCEDURE — 99222 1ST HOSP IP/OBS MODERATE 55: CPT | Performed by: BEHAVIOR TECHNICIAN

## 2025-05-21 PROCEDURE — 2500000001 HC RX 250 WO HCPCS SELF ADMINISTERED DRUGS (ALT 637 FOR MEDICARE OP): Performed by: NURSE PRACTITIONER

## 2025-05-21 RX ORDER — BISACODYL 10 MG/1
10 SUPPOSITORY RECTAL ONCE
Status: COMPLETED | OUTPATIENT
Start: 2025-05-21 | End: 2025-05-21

## 2025-05-21 RX ORDER — DOCUSATE SODIUM 100 MG/1
100 CAPSULE, LIQUID FILLED ORAL DAILY
Status: DISCONTINUED | OUTPATIENT
Start: 2025-05-21 | End: 2025-05-21

## 2025-05-21 RX ORDER — ATORVASTATIN CALCIUM 40 MG/1
40 TABLET, FILM COATED ORAL DAILY
Status: DISCONTINUED | OUTPATIENT
Start: 2025-05-21 | End: 2025-05-23 | Stop reason: HOSPADM

## 2025-05-21 RX ORDER — WARFARIN SODIUM 5 MG/1
7.5 TABLET ORAL DAILY
Status: DISCONTINUED | OUTPATIENT
Start: 2025-05-21 | End: 2025-05-21

## 2025-05-21 RX ORDER — POTASSIUM CHLORIDE 20 MEQ/1
20 TABLET, EXTENDED RELEASE ORAL DAILY
Status: DISCONTINUED | OUTPATIENT
Start: 2025-05-21 | End: 2025-05-23 | Stop reason: HOSPADM

## 2025-05-21 RX ORDER — TALC
3 POWDER (GRAM) TOPICAL NIGHTLY PRN
Status: DISCONTINUED | OUTPATIENT
Start: 2025-05-21 | End: 2025-05-23 | Stop reason: HOSPADM

## 2025-05-21 RX ORDER — DEXTROSE 50 % IN WATER (D50W) INTRAVENOUS SYRINGE
12.5
Status: DISCONTINUED | OUTPATIENT
Start: 2025-05-21 | End: 2025-05-23 | Stop reason: HOSPADM

## 2025-05-21 RX ORDER — ACETAMINOPHEN 650 MG/1
650 SUPPOSITORY RECTAL EVERY 4 HOURS PRN
Status: DISCONTINUED | OUTPATIENT
Start: 2025-05-21 | End: 2025-05-21

## 2025-05-21 RX ORDER — DEXTROSE 50 % IN WATER (D50W) INTRAVENOUS SYRINGE
25
Status: DISCONTINUED | OUTPATIENT
Start: 2025-05-21 | End: 2025-05-23 | Stop reason: HOSPADM

## 2025-05-21 RX ORDER — ONDANSETRON HYDROCHLORIDE 2 MG/ML
4 INJECTION, SOLUTION INTRAVENOUS EVERY 8 HOURS PRN
Status: DISCONTINUED | OUTPATIENT
Start: 2025-05-21 | End: 2025-05-23 | Stop reason: HOSPADM

## 2025-05-21 RX ORDER — WARFARIN SODIUM 5 MG/1
5 TABLET ORAL DAILY
Status: DISCONTINUED | OUTPATIENT
Start: 2025-05-21 | End: 2025-05-23 | Stop reason: HOSPADM

## 2025-05-21 RX ORDER — MIRTAZAPINE 15 MG/1
15 TABLET, FILM COATED ORAL NIGHTLY
Status: DISCONTINUED | OUTPATIENT
Start: 2025-05-21 | End: 2025-05-23 | Stop reason: HOSPADM

## 2025-05-21 RX ORDER — MAGNESIUM SULFATE HEPTAHYDRATE 40 MG/ML
2 INJECTION, SOLUTION INTRAVENOUS ONCE
Status: COMPLETED | OUTPATIENT
Start: 2025-05-21 | End: 2025-05-21

## 2025-05-21 RX ORDER — TALC
3 POWDER (GRAM) TOPICAL NIGHTLY
COMMUNITY
Start: 2025-04-17

## 2025-05-21 RX ORDER — ACETAMINOPHEN 325 MG/1
650 TABLET ORAL EVERY 4 HOURS PRN
Status: DISCONTINUED | OUTPATIENT
Start: 2025-05-21 | End: 2025-05-23 | Stop reason: HOSPADM

## 2025-05-21 RX ORDER — ACETAMINOPHEN 160 MG/5ML
650 SOLUTION ORAL EVERY 4 HOURS PRN
Status: DISCONTINUED | OUTPATIENT
Start: 2025-05-21 | End: 2025-05-21

## 2025-05-21 RX ORDER — ONDANSETRON 4 MG/1
4 TABLET, FILM COATED ORAL EVERY 8 HOURS PRN
Status: DISCONTINUED | OUTPATIENT
Start: 2025-05-21 | End: 2025-05-23 | Stop reason: HOSPADM

## 2025-05-21 RX ORDER — AMOXICILLIN 250 MG
1 CAPSULE ORAL NIGHTLY
Status: DISCONTINUED | OUTPATIENT
Start: 2025-05-21 | End: 2025-05-21

## 2025-05-21 RX ORDER — CEFTRIAXONE 2 G/50ML
2 INJECTION, SOLUTION INTRAVENOUS ONCE
Status: COMPLETED | OUTPATIENT
Start: 2025-05-21 | End: 2025-05-21

## 2025-05-21 RX ORDER — ACETAMINOPHEN 160 MG/5ML
650 SOLUTION ORAL ONCE
Status: COMPLETED | OUTPATIENT
Start: 2025-05-21 | End: 2025-05-21

## 2025-05-21 RX ORDER — NYSTATIN 100000 [USP'U]/G
1 POWDER TOPICAL 2 TIMES DAILY
Status: DISCONTINUED | OUTPATIENT
Start: 2025-05-21 | End: 2025-05-23 | Stop reason: HOSPADM

## 2025-05-21 RX ORDER — POLYETHYLENE GLYCOL 3350 17 G/17G
17 POWDER, FOR SOLUTION ORAL 2 TIMES DAILY
Status: DISCONTINUED | OUTPATIENT
Start: 2025-05-22 | End: 2025-05-23 | Stop reason: HOSPADM

## 2025-05-21 RX ORDER — ATENOLOL 50 MG/1
50 TABLET ORAL DAILY
Status: DISCONTINUED | OUTPATIENT
Start: 2025-05-21 | End: 2025-05-23 | Stop reason: HOSPADM

## 2025-05-21 RX ORDER — POLYETHYLENE GLYCOL 3350 17 G/17G
17 POWDER, FOR SOLUTION ORAL DAILY
Status: DISCONTINUED | OUTPATIENT
Start: 2025-05-21 | End: 2025-05-21

## 2025-05-21 RX ORDER — DONEPEZIL HYDROCHLORIDE 5 MG/1
10 TABLET, FILM COATED ORAL NIGHTLY
Status: DISCONTINUED | OUTPATIENT
Start: 2025-05-21 | End: 2025-05-23 | Stop reason: HOSPADM

## 2025-05-21 RX ORDER — HEPARIN SODIUM 10000 [USP'U]/100ML
0-4500 INJECTION, SOLUTION INTRAVENOUS CONTINUOUS
Status: DISCONTINUED | OUTPATIENT
Start: 2025-05-21 | End: 2025-05-21

## 2025-05-21 RX ORDER — TALC
3 POWDER (GRAM) TOPICAL NIGHTLY
Status: DISCONTINUED | OUTPATIENT
Start: 2025-05-21 | End: 2025-05-23 | Stop reason: HOSPADM

## 2025-05-21 RX ORDER — AMOXICILLIN 250 MG
2 CAPSULE ORAL NIGHTLY
Status: DISCONTINUED | OUTPATIENT
Start: 2025-05-21 | End: 2025-05-23 | Stop reason: HOSPADM

## 2025-05-21 RX ORDER — CEFTRIAXONE 1 G/50ML
1 INJECTION, SOLUTION INTRAVENOUS EVERY 24 HOURS
Status: DISCONTINUED | OUTPATIENT
Start: 2025-05-22 | End: 2025-05-22

## 2025-05-21 RX ORDER — INSULIN LISPRO 100 [IU]/ML
0-15 INJECTION, SOLUTION INTRAVENOUS; SUBCUTANEOUS
Status: DISCONTINUED | OUTPATIENT
Start: 2025-05-21 | End: 2025-05-22

## 2025-05-21 RX ADMIN — NYSTATIN 1 APPLICATION: 100000 POWDER TOPICAL at 20:30

## 2025-05-21 RX ADMIN — INSULIN LISPRO 3 UNITS: 100 INJECTION, SOLUTION INTRAVENOUS; SUBCUTANEOUS at 20:31

## 2025-05-21 RX ADMIN — POTASSIUM CHLORIDE 20 MEQ: 1500 TABLET, EXTENDED RELEASE ORAL at 18:49

## 2025-05-21 RX ADMIN — MIRTAZAPINE 15 MG: 15 TABLET, FILM COATED ORAL at 20:29

## 2025-05-21 RX ADMIN — IOHEXOL 75 ML: 350 INJECTION, SOLUTION INTRAVENOUS at 15:15

## 2025-05-21 RX ADMIN — MAGNESIUM SULFATE HEPTAHYDRATE 2 G: 40 INJECTION, SOLUTION INTRAVENOUS at 16:38

## 2025-05-21 RX ADMIN — ACETAMINOPHEN 650 MG: 650 SOLUTION ORAL at 13:57

## 2025-05-21 RX ADMIN — CEFTRIAXONE 2 G: 2 INJECTION, SOLUTION INTRAVENOUS at 13:58

## 2025-05-21 RX ADMIN — BISACODYL 10 MG: 10 SUPPOSITORY RECTAL at 20:29

## 2025-05-21 RX ADMIN — Medication 3 MG: at 20:29

## 2025-05-21 RX ADMIN — DONEPEZIL HYDROCHLORIDE 10 MG: 5 TABLET ORAL at 20:29

## 2025-05-21 RX ADMIN — ATORVASTATIN CALCIUM 40 MG: 40 TABLET, FILM COATED ORAL at 20:29

## 2025-05-21 RX ADMIN — ATENOLOL 50 MG: 50 TABLET ORAL at 18:49

## 2025-05-21 RX ADMIN — SENNOSIDES AND DOCUSATE SODIUM 2 TABLET: 50; 8.6 TABLET ORAL at 20:29

## 2025-05-21 SDOH — SOCIAL STABILITY: SOCIAL INSECURITY: HAVE YOU HAD THOUGHTS OF HARMING ANYONE ELSE?: UNABLE TO ASSESS

## 2025-05-21 SDOH — SOCIAL STABILITY: SOCIAL INSECURITY: ARE THERE ANY APPARENT SIGNS OF INJURIES/BEHAVIORS THAT COULD BE RELATED TO ABUSE/NEGLECT?: UNABLE TO ASSESS

## 2025-05-21 SDOH — SOCIAL STABILITY: SOCIAL INSECURITY: HAVE YOU HAD ANY THOUGHTS OF HARMING ANYONE ELSE?: UNABLE TO ASSESS

## 2025-05-21 SDOH — SOCIAL STABILITY: SOCIAL INSECURITY: WERE YOU ABLE TO COMPLETE ALL THE BEHAVIORAL HEALTH SCREENINGS?: YES

## 2025-05-21 SDOH — SOCIAL STABILITY: SOCIAL INSECURITY: DOES ANYONE TRY TO KEEP YOU FROM HAVING/CONTACTING OTHER FRIENDS OR DOING THINGS OUTSIDE YOUR HOME?: UNABLE TO ASSESS

## 2025-05-21 SDOH — SOCIAL STABILITY: SOCIAL INSECURITY: WITHIN THE LAST YEAR, HAVE YOU BEEN AFRAID OF YOUR PARTNER OR EX-PARTNER?: PATIENT UNABLE TO ANSWER

## 2025-05-21 SDOH — SOCIAL STABILITY: SOCIAL INSECURITY: DO YOU FEEL ANYONE HAS EXPLOITED OR TAKEN ADVANTAGE OF YOU FINANCIALLY OR OF YOUR PERSONAL PROPERTY?: UNABLE TO ASSESS

## 2025-05-21 SDOH — SOCIAL STABILITY: SOCIAL INSECURITY: ABUSE: ADULT

## 2025-05-21 SDOH — SOCIAL STABILITY: SOCIAL INSECURITY: HAS ANYONE EVER THREATENED TO HURT YOUR FAMILY OR YOUR PETS?: UNABLE TO ASSESS

## 2025-05-21 SDOH — SOCIAL STABILITY: SOCIAL INSECURITY: ARE YOU OR HAVE YOU BEEN THREATENED OR ABUSED PHYSICALLY, EMOTIONALLY, OR SEXUALLY BY ANYONE?: UNABLE TO ASSESS

## 2025-05-21 SDOH — SOCIAL STABILITY: SOCIAL INSECURITY: DO YOU FEEL UNSAFE GOING BACK TO THE PLACE WHERE YOU ARE LIVING?: UNABLE TO ASSESS

## 2025-05-21 ASSESSMENT — COGNITIVE AND FUNCTIONAL STATUS - GENERAL
DRESSING REGULAR LOWER BODY CLOTHING: A LOT
PERSONAL GROOMING: A LITTLE
MOVING TO AND FROM BED TO CHAIR: A LOT
TURNING FROM BACK TO SIDE WHILE IN FLAT BAD: A LOT
TURNING FROM BACK TO SIDE WHILE IN FLAT BAD: A LOT
TOILETING: A LOT
TOILETING: A LOT
CLIMB 3 TO 5 STEPS WITH RAILING: A LOT
HELP NEEDED FOR BATHING: A LOT
EATING MEALS: A LITTLE
MOVING TO AND FROM BED TO CHAIR: A LOT
DRESSING REGULAR LOWER BODY CLOTHING: A LOT
WALKING IN HOSPITAL ROOM: A LOT
WALKING IN HOSPITAL ROOM: A LOT
DAILY ACTIVITIY SCORE: 14
PATIENT BASELINE BEDBOUND: NO
STANDING UP FROM CHAIR USING ARMS: A LOT
MOVING FROM LYING ON BACK TO SITTING ON SIDE OF FLAT BED WITH BEDRAILS: A LOT
MOVING FROM LYING ON BACK TO SITTING ON SIDE OF FLAT BED WITH BEDRAILS: A LOT
MOBILITY SCORE: 12
STANDING UP FROM CHAIR USING ARMS: A LOT
DRESSING REGULAR UPPER BODY CLOTHING: A LOT
DAILY ACTIVITIY SCORE: 14
CLIMB 3 TO 5 STEPS WITH RAILING: A LOT
PERSONAL GROOMING: A LITTLE
EATING MEALS: A LITTLE
MOBILITY SCORE: 12
DRESSING REGULAR UPPER BODY CLOTHING: A LOT
HELP NEEDED FOR BATHING: A LOT

## 2025-05-21 ASSESSMENT — ACTIVITIES OF DAILY LIVING (ADL)
TOILETING: NEEDS ASSISTANCE
GROOMING: NEEDS ASSISTANCE
LACK_OF_TRANSPORTATION: PATIENT UNABLE TO ANSWER
ADEQUATE_TO_COMPLETE_ADL: YES
ASSISTIVE_DEVICE: WALKER
DRESSING YOURSELF: NEEDS ASSISTANCE
JUDGMENT_ADEQUATE_SAFELY_COMPLETE_DAILY_ACTIVITIES: NO
HEARING - RIGHT EAR: FUNCTIONAL
PATIENT'S MEMORY ADEQUATE TO SAFELY COMPLETE DAILY ACTIVITIES?: NO
HEARING - LEFT EAR: FUNCTIONAL
WALKS IN HOME: NEEDS ASSISTANCE
FEEDING YOURSELF: NEEDS ASSISTANCE
BATHING: NEEDS ASSISTANCE

## 2025-05-21 ASSESSMENT — PATIENT HEALTH QUESTIONNAIRE - PHQ9
1. LITTLE INTEREST OR PLEASURE IN DOING THINGS: NOT AT ALL
2. FEELING DOWN, DEPRESSED OR HOPELESS: NOT AT ALL
SUM OF ALL RESPONSES TO PHQ9 QUESTIONS 1 & 2: 0

## 2025-05-21 ASSESSMENT — LIFESTYLE VARIABLES
HOW OFTEN DO YOU HAVE 6 OR MORE DRINKS ON ONE OCCASION: PATIENT UNABLE TO ANSWER
AUDIT-C TOTAL SCORE: -1
HOW MANY STANDARD DRINKS CONTAINING ALCOHOL DO YOU HAVE ON A TYPICAL DAY: PATIENT UNABLE TO ANSWER
HOW OFTEN DO YOU HAVE A DRINK CONTAINING ALCOHOL: PATIENT UNABLE TO ANSWER
AUDIT-C TOTAL SCORE: -1
SKIP TO QUESTIONS 9-10: 0

## 2025-05-21 ASSESSMENT — PAIN - FUNCTIONAL ASSESSMENT
PAIN_FUNCTIONAL_ASSESSMENT: 0-10
PAIN_FUNCTIONAL_ASSESSMENT: PAINAD (PAIN ASSESSMENT IN ADVANCED DEMENTIA SCALE)
PAIN_FUNCTIONAL_ASSESSMENT: UNABLE TO SELF-REPORT

## 2025-05-21 ASSESSMENT — PAIN SCALES - PAIN ASSESSMENT IN ADVANCED DEMENTIA (PAINAD)
FACIALEXPRESSION: SMILING OR INEXPRESSIVE
TOTALSCORE: 4
CONSOLABILITY: DISTRACTED OR REASSURED BY VOICE/TOUCH
BODYLANGUAGE: TENSE, DISTRESSED PACING, FIDGETING
NEGVOCALIZATION: OCCASIONAL MOAN/GROAN, LOW SPEECH, NEGATIVE/DISAPPROVING QUALITY
BREATHING: OCCASIONAL LABORED BREATHING, SHORT PERIOD OF HYPERVENTILATION

## 2025-05-21 ASSESSMENT — PAIN SCALES - GENERAL: PAINLEVEL_OUTOF10: 0 - NO PAIN

## 2025-05-21 NOTE — ED PROVIDER NOTES
"Emergency Department Encounter  [facility] EMERGENCY MEDICINE    Patient: Inge Clark  MRN: [id number]  : 1945  Date of Evaluation: 2025  ED Provider: NISHI King    ED care was supervised by Dr. Painting who independently examined and evaluated the patient. Please see their attestation note for further details.    Limitations to history: mental status, hx dementia, AAOx0 at baseline  Independent Historian: self and family  Records reviewed: Care everywhere, paper chart, Inpatient and outpatient notes    Chief Complaint       Chief Complaint   Patient presents with    Abdominal Pain    Chills     Pt has dementia, A&OX0. Unable to provide hx.      [place]    (Location/Symptom, Timing/Onset, Context/Setting, Quality, Duration, Modifying Factors, Severity) Note limiting factors.   Inge Clark is a 80 y.o. female who presents to the emergency department with her family as they were concerned due to patient having \"shaking\" today, with recent admission with low magnesium and potassium, family was concerned that her electrolytes were off again, initially had complained of abdominal pain to the family when she was going to the bathroom, unsure if it was due to having a bowel movement or due to urination.  Family states that she has been more confused than her baseline.  No coughing.  Patient denies any chest pain.  No nausea, vomiting noted per patient's family.    ROS:     Review of Systems  14 systems reviewed and otherwise acutely negative except as in the [place].          Past History   Medical History[1]  Surgical History[2]  Social History[3]    Medications/Allergies     Previous Medications    ATENOLOL (TENORMIN) 50 MG TABLET    Take 1 tablet (50 mg) by mouth once daily.    ATORVASTATIN (LIPITOR) 40 MG TABLET    Take 1 tablet (40 mg) by mouth once daily.    BLOOD SUGAR DIAGNOSTIC (ONETOUCH ULTRA TEST)    TEST ONCE DAILY    BLOOD-GLUCOSE METER,CONTINUOUS MISC    Use as " instructed    DOCUSATE SODIUM (COLACE) 100 MG CAPSULE    Take 1 capsule (100 mg) by mouth once daily.    DONEPEZIL (ARICEPT) 10 MG TABLET    Take 1 tablet (10 mg) by mouth once daily at bedtime.    FREESTOTOY CLINT 14 DAY SENSOR KIT    Use as instructed    GLIPIZIDE 2.5 MG TABLET    Take 2.5 mg by mouth once daily.    MIRTAZAPINE (REMERON) 15 MG TABLET    Take 1 tablet (15 mg) by mouth once daily at bedtime.    POTASSIUM CHLORIDE CR (KLOR-CON M20) 20 MEQ ER TABLET    Take 1 tablet (20 mEq) by mouth once daily. Do not crush or chew.    WARFARIN (COUMADIN) 5 MG TABLET    Take 1 tablet (5 mg) by mouth once daily in the evening. Take as directed per After Visit Summary. Do not fill before May 13, 2025.    WARFARIN (COUMADIN) 7.5 MG TABLET    Take 1 tablet once in the evening for 1 dose.     Allergies[4]     Physical Exam       ED Triage Vitals [05/21/25 1314]   Temperature Heart Rate Respirations BP   (!) 38.1 °C (100.6 °F) 94 (!) 22 (!) 115/98      Pulse Ox Temp src Heart Rate Source Patient Position   100 % -- -- --      BP Location FiO2 (%)     -- --         Physical Exam    GENERAL:  The patient appears nourished and normally developed. Vital signs as documented.     HEENT:  Head normocephalic, atraumatic, EOMs intact, PERRLA, Mucous membranes moist. Nares patent without copious rhinorrhea.  No lymphadenopathy.    PULMONARY:  Lungs are clear to auscultation, without any respiratory distress. Able to speak full sentences, no accessory muscle use    CARDIAC:   Normal rate. No murmurs, rubs or gallops    ABDOMEN:  Soft, non-distended, non-tender, BS positive x 4 quadrants, No rebound or guarding, no peritoneal signs, no CVA tenderness, no masses or organomegaly    MUSCULOSKELETAL:  Non edematous, with no obvious deformities. Pulses intact distal    SKIN:   Good color, with no significant rashes.  No pallor.    NEURO:  No obvious neurological deficits, normal sensation and strength bilaterally.  Able to follow commands,  NIH 0, CN 2-12 intact.        Diagnostics   Labs:  Results for orders placed or performed during the hospital encounter of 05/21/25   CBC and Auto Differential    Collection Time: 05/21/25  1:12 PM   Result Value Ref Range    WBC 15.0 (H) 4.4 - 11.3 x10*3/uL    nRBC 0.0 0.0 - 0.0 /100 WBCs    RBC 4.22 4.00 - 5.20 x10*6/uL    Hemoglobin 12.6 12.0 - 16.0 g/dL    Hematocrit 37.8 36.0 - 46.0 %    MCV 90 80 - 100 fL    MCH 29.9 26.0 - 34.0 pg    MCHC 33.3 32.0 - 36.0 g/dL    RDW 14.3 11.5 - 14.5 %    Platelets 100 (L) 150 - 450 x10*3/uL    Neutrophils % 86.0 40.0 - 80.0 %    Immature Granulocytes %, Automated 0.5 0.0 - 0.9 %    Lymphocytes % 8.5 13.0 - 44.0 %    Monocytes % 4.6 2.0 - 10.0 %    Eosinophils % 0.2 0.0 - 6.0 %    Basophils % 0.2 0.0 - 2.0 %    Neutrophils Absolute 12.88 (H) 1.60 - 5.50 x10*3/uL    Immature Granulocytes Absolute, Automated 0.07 0.00 - 0.50 x10*3/uL    Lymphocytes Absolute 1.28 0.80 - 3.00 x10*3/uL    Monocytes Absolute 0.69 0.05 - 0.80 x10*3/uL    Eosinophils Absolute 0.03 0.00 - 0.40 x10*3/uL    Basophils Absolute 0.03 0.00 - 0.10 x10*3/uL   Comprehensive metabolic panel    Collection Time: 05/21/25  1:12 PM   Result Value Ref Range    Glucose 184 (H) 74 - 99 mg/dL    Sodium 136 136 - 145 mmol/L    Potassium 6.1 (HH) 3.5 - 5.3 mmol/L    Chloride 101 98 - 107 mmol/L    Bicarbonate 23 21 - 32 mmol/L    Anion Gap 18 10 - 20 mmol/L    Urea Nitrogen 18 6 - 23 mg/dL    Creatinine 0.73 0.50 - 1.05 mg/dL    eGFR 84 >60 mL/min/1.73m*2    Calcium 9.4 8.6 - 10.3 mg/dL    Albumin 3.7 3.4 - 5.0 g/dL    Alkaline Phosphatase 103 33 - 136 U/L    Total Protein 7.7 6.4 - 8.2 g/dL    AST 28 9 - 39 U/L    Bilirubin, Total 0.9 0.0 - 1.2 mg/dL    ALT 30 7 - 45 U/L   Magnesium    Collection Time: 05/21/25  1:12 PM   Result Value Ref Range    Magnesium 1.64 1.60 - 2.40 mg/dL   Troponin I, High Sensitivity    Collection Time: 05/21/25  1:12 PM   Result Value Ref Range    Troponin I, High Sensitivity 5 0 - 13 ng/L    Lactate    Collection Time: 05/21/25  1:56 PM   Result Value Ref Range    Lactate 1.9 0.4 - 2.0 mmol/L   Sars-CoV-2 and Influenza A/B PCR    Collection Time: 05/21/25  1:56 PM   Result Value Ref Range    Flu A Result Not Detected Not Detected    Flu B Result Not Detected Not Detected    Coronavirus 2019, PCR Not Detected Not Detected   Urinalysis with Reflex Culture and Microscopic    Collection Time: 05/21/25  1:56 PM   Result Value Ref Range    Color, Urine Yellow Light-Yellow, Yellow, Dark-Yellow    Appearance, Urine Turbid (N) Clear    Specific Gravity, Urine 1.025 1.005 - 1.035    pH, Urine 6.0 5.0, 5.5, 6.0, 6.5, 7.0, 7.5, 8.0    Protein, Urine 50 (1+) (A) NEGATIVE, 10 (TRACE), 20 (TRACE) mg/dL    Glucose, Urine 200 (2+) (A) Normal mg/dL    Blood, Urine 0.06 (1+) (A) NEGATIVE mg/dL    Ketones, Urine TRACE (A) NEGATIVE mg/dL    Bilirubin, Urine NEGATIVE NEGATIVE mg/dL    Urobilinogen, Urine Normal Normal mg/dL    Nitrite, Urine NEGATIVE NEGATIVE    Leukocyte Esterase, Urine 75 Rosanna/uL (A) NEGATIVE   Microscopic Only, Urine    Collection Time: 05/21/25  1:56 PM   Result Value Ref Range    WBC, Urine 6-10 (A) 1-5, NONE /HPF    RBC, Urine 11-20 (A) NONE, 1-2, 3-5 /HPF    Squamous Epithelial Cells, Urine 1-9 (SPARSE) Reference range not established. /HPF    Mucus, Urine 4+ Reference range not established. /LPF    Hyaline Casts, Urine 3+ (A) NONE /LPF    Amorphous Crystals, Urine 1+ NONE, 1+, 2+ /HPF   Potassium    Collection Time: 05/21/25  2:45 PM   Result Value Ref Range    Potassium 3.6 3.5 - 5.3 mmol/L   Magnesium    Collection Time: 05/21/25  2:45 PM   Result Value Ref Range    Magnesium 1.32 (L) 1.60 - 2.40 mg/dL   Coagulation Screen    Collection Time: 05/21/25  3:49 PM   Result Value Ref Range    Protime 45.0 (H) 9.8 - 12.4 seconds    INR 4.1 (H) 0.9 - 1.1    aPTT 40 (H) 26 - 36 seconds   Heparin Assay    Collection Time: 05/21/25  3:49 PM   Result Value Ref Range    Heparin Unfractionated 0.1 See Comment  Below for Therapeutic Ranges IU/mL     Radiographs:  Vascular US lower extremity venous duplex bilateral   Final Result   No deep venous thrombosis of the  bilateral lower extremity.        MACRO:   None        Signed by: Ncik Wilkinson 5/21/2025 5:11 PM   Dictation workstation:   [id number]      CT head wo IV contrast   Final Result   1. Within the aforementioned limitation no intracranial hemorrhage.   2. Mild age related changes again noted without acute intracranial   process.        Signed by: Sandoval Feldman 5/21/2025 4:36 PM   Dictation workstation:   [id number]      CT abdomen pelvis w IV contrast   Final Result   Addendum (preliminary) 1 of 1   Interpreted By:  Sal Nicholson,    ADDENDUM:   Please note that the thrombus within the IVC is present on a prior CT   of the chest, abdomen, and pelvis from 05/06/2025. My comparison was   with a more remote study.        Signed by: Sal Nicholson 5/21/2025 3:53 PM        -------- ORIGINAL REPORT --------   Dictation workstation:   [id number]      Final   1. There is a large filling defect within the inferior vena cava   extending from the level of the renal veins to the IVC bifurcation at   the common iliac confluence, highly suspicious for a thrombus. This   would certainly be at risk for migration to the pulmonary arterial   tree.   2. Large amount of stool within the rectum with thickening of the   presacral soft tissues as above of uncertain significance. Findings   could relate to inflammatory changes of the presacral space in   association with rectal fecal impaction.   3. No change in the cystic lesion within the pancreatic tail   measuring 6 mm dating back to at least 2022. The stability suggests a   benign etiology.             MACRO:   Sal Nicholson communicated these findings by [employer] secure chat with   LINDA RUSH on 5/21/2025 at 3:35 pm.  (**-RCF-**) Findings:  See   findings.             Signed by: Sal Nicholson 5/21/2025 3:36 PM   Dictation  workstation:   [id number]      XR chest 1 view   Final Result   No evidence of acute intrathoracic abnormality.        Signed by: Gonzalez Reid 5/21/2025 1:57 PM   Dictation workstation:   [id number]          Procedures:   Procedures     EKG: Independently reviewed by this provider at 1300  Indication: Mental status change  Rate: 93  Rhythm: Normal sinus rhythm  Interval: Normal  Axis: Right  ST Segment: No ST elevation      Assessment   In brief, Inge Clark is a 80 y.o. female who presented to the emergency department with change in mental status and febrile illness    Plan   IV, lab work, cultures, urinalysis, chest x-ray    Differentials   UTI  Pneumonia  Viral illness  Intra-abdominal infection    ED Course     ED Course as of 05/21/25 1726   Wed May 21, 2025   1540 IMPRESSION:  1. There is a large filling defect within the inferior vena cava  extending from the level of the renal veins to the IVC bifurcation at  the common iliac confluence, highly suspicious for a thrombus. This  would certainly be at risk for migration to the pulmonary arterial  tree.  2. Large amount of stool within the rectum with thickening of the  presacral soft tissues as above of uncertain significance. Findings  could relate to inflammatory changes of the presacral space in  association with rectal fecal impaction.  3. No change in the cystic lesion within the pancreatic tail  measuring 6 mm dating back to at least 2022. The stability suggests a  benign etiology.       [HD]   1549 IMPRESSION 5/6:  large partially occlusive thrombus within the IVC measuring  approximately 11 cm in length extending from the iliocaval  bifurcation and extending just above the renal veins. At most this  result in 85% stenosis in the infrarenal cava. [HD]   1613 Patient is a 80-year-old female with a history of dementia who is alert and oriented x 0 at baseline presents to the emergency department with shaking.  Daughter states when this happens it  "has been usually because her magnesium is low.  She is supratherapeutic on her INR with an INR of 4.  She is on warfarin after they diagnosed an IVC clot on May 6.  Is unchanged on repeat CAT scan today.  She was endorsing abdominal pain per family however abdomen soft and nontender on my reevaluation.  But given the endorsement of abdominal pain and her age CT obtained.  Patient was febrile here which was likely causing the shaking.  She has a mild urinary tract infection and covered with Rocephin.  Electrolytes repleted.  Patient lives at home with her  who has Parkinson's who has difficulty taking care of her and therefore will require admission.  Given the altered mental status will obtain CT of her head due to the fact that she is on warfarin.  She was admitted with a May 6 and there was no discussion of IVC filter then.  Did reach out to interventional cardiology to see if they would potentially be willing to place an IVC filter if necessary however family is unsure if patient would even want 1.  Therefore will admit here and can have further discussions regarding IVC filter in the hospital. [HD]      ED Course User Index  [HD] Halie Painting DO         Diagnoses as of 05/21/25 1726   Lower urinary tract infectious disease   Febrile illness   Thrombosis of inferior vena cava (Multi)       Visit Vitals  /64 (BP Location: Left arm, Patient Position: Lying)   Pulse 77   Temp 36.9 °C (98.4 °F)   Resp 15   Ht 1.6 m (5' 3\")   Wt 56.7 kg (125 lb)   SpO2 97%   BMI 22.14 kg/m²   OB Status Postmenopausal   Smoking Status Never   BSA 1.59 m²       Medications   nystatin (Mycostatin) 100,000 unit/gram powder 1 Application (has no administration in time range)   magnesium sulfate 2 g in sterile water for injection 50 mL (2 g intravenous New Bag 5/21/25 1638)   acetaminophen (Tylenol) oral liquid 650 mg (650 mg oral Given 5/21/25 1357)   cefTRIAXone (Rocephin) 2 g in dextrose (iso) IV 50 mL (0 g intravenous " "Stopped 5/21/25 1432)   iohexol (OMNIPaque) 350 mg iodine/mL solution 75 mL (75 mL intravenous Given 5/21/25 1515)       Plan of care discussed, patient is stable appearing, is noted to be febrile on initial evaluation likely the cause of her \"shaking\", was able to tolerate Tylenol liquid, family does report that she has problems swallowing pills and food.  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  Case discussed with and seen by ED attending, plan is for patient to be admitted for febrile illness, UTI, mental status change      Final Impression      1. Lower urinary tract infectious disease    2. Febrile illness    3. Thrombosis of inferior vena cava (Multi)          DISPOSITION  Disposition: Admit to med/surg floor  Patient condition is stable    Comment: Please note this report has been produced using speech recognition software and may contain errors related to that system including errors in grammar, punctuation, and spelling, as well as words and phrases that may be inappropriate.  If there are any questions or concerns please feel free to contact the dictating provider for clarification.    PRAVIN King-CNP       [1]   Past Medical History:  Diagnosis Date    Elevated troponin I level 08/15/2024    Essential (primary) hypertension 12/29/2022    HTN (hypertension), benign    Insect bite (nonvenomous) of scalp, initial encounter (CODE) 06/05/2017    Tick bite of scalp, initial encounter    Listeriosis, unspecified 05/23/2016    Listeria infection    Other conditions influencing health status 01/29/2018    " Normal colonoscopy    Pyelonephritis of left kidney 04/11/2023    Type 2 diabetes mellitus with other specified complication 12/29/2022    DM type 2 with diabetic mixed hyperlipidemia    Unilateral primary osteoarthritis, right hip 12/09/2019    Primary osteoarthritis of right hip    Vitamin D deficiency, unspecified     Vitamin D deficiency   [2]   Past Surgical History:  Procedure Laterality Date    OTHER SURGICAL HISTORY  09/30/2019    Tonsillectomy with adenoidectomy   [3]   Social History  Socioeconomic History    Marital status:      Spouse name: Adriel    Number of children: 2    Highest education level: High school graduate   Occupational History    Occupation: Worked in [employer], 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   Tobacco Use    Smoking status: Never    Smokeless tobacco: Never   Vaping Use    Vaping status: Never Used   Substance and Sexual Activity    Alcohol use: Yes     Alcohol/week: 1.0 standard drink of alcohol     Types: 1 Standard drinks or equivalent per week     Comment: Socially    Drug use: Never    Sexual activity: Defer     Social Drivers of Health     Financial Resource Strain: Patient Unable To Answer (5/21/2025)    Overall Financial Resource Strain (CARDIA)     Difficulty of Paying Living Expenses: Patient unable to answer   Food Insecurity: Patient Unable To Answer (5/6/2025)    Hunger Vital Sign     Worried About Running Out of Food in the Last Year: Patient unable to answer     Ran Out of Food in the Last Year: Patient unable to answer   Transportation Needs: Patient Unable To Answer (5/21/2025)    PRAPARE - Transportation     Lack of Transportation (Medical): Patient unable to answer     Lack of Transportation (Non-Medical): Patient unable to answer   Intimate Partner Violence: Patient Unable To Answer (5/6/2025)    Humiliation, Afraid, Rape, and Kick questionnaire     Fear of Current or Ex-Partner: Patient unable to answer     Emotionally Abused: Patient unable to answer      Physically Abused: Patient unable to answer     Sexually Abused: Patient unable to answer   Housing Stability: Patient Unable To Answer (5/21/2025)    Housing Stability Vital Sign     Unable to Pay for Housing in the Last Year: Patient unable to answer     Number of Times Moved in the Last Year: 0     Homeless in the Last Year: Patient unable to answer   [4]   Allergies  Allergen Reactions    Grass Pollen Unknown        NISHI King  05/21/25 [id number]

## 2025-05-21 NOTE — PROGRESS NOTES
05/21/25 1602   Discharge Planning   Living Arrangements Spouse/significant other   Support Systems Spouse/significant other;Children   Assistance Needed A&OX0-1; dependent on assist for ADLs from spouse but spouse struggling with care; doesn't drive; room air baseline and currently room air;PCP Maryann Almazan CNP; on Coumadin prior to hospitalization.   Type of Residence Private residence   Number of Stairs to Enter Residence 3   Number of Stairs Within Residence 0   Do you have animals or pets at home? No   Home or Post Acute Services In home services   Expected Discharge Disposition Short Term A  (Patient is a possible transfer to [facility] vs resumption with [facility])   Does the patient need discharge transport arranged? Yes   RoundTrip coordination needed? Yes   Has discharge transport been arranged? No   Financial Resource Strain   How hard is it for you to pay for the very basics like food, housing, medical care, and heating? Pt Unable   Housing Stability   In the last 12 months, was there a time when you were not able to pay the mortgage or rent on time? Pt Unable   In the past 12 months, how many times have you moved where you were living? 0   At any time in the past 12 months, were you homeless or living in a shelter (including now)? Pt Unable   Transportation Needs   In the past 12 months, has lack of transportation kept you from medical appointments or from getting medications? Pt Unable   In the past 12 months, has lack of transportation kept you from meetings, work, or from getting things needed for daily living? Pt Unable   Intensity of Service   Intensity of Service 0-30 min     05/21/2025 1605pm  Spoke with patient and patient's spouse and daughter bedside in ED. Pt home with spouse and spouse is caregiver and is struggling with care in the home. Spouse and children have been working on placement in memory care unit. Pt is a possible transfer to [facility] vs admit for IVC placement.  Palliative care should be consulted

## 2025-05-21 NOTE — PROGRESS NOTES
Pharmacy Medication History Review    Inge Clark is a 80 y.o. female admitted for No Principal Problem: There is no principal problem currently on the Problem List. Please update the Problem List and refresh.. Pharmacy reviewed the patient's [id number] medications and allergies for accuracy.    The list below reflectives the updated PTA list. Please review each medication in order reconciliation for additional clarification and justification.  Prior to Admission Medications   Prescriptions Last Dose Informant Patient Reported? Taking?   FreeStyle Elsie 14 Day Sensor kit  Spouse/Significant Other     Sig: Use as instructed   atenolol (Tenormin) 50 mg tablet 5/20/2025 Spouse/Significant Other Yes Yes   Sig: Take 1 tablet (50 mg) by mouth once daily.   atorvastatin (Lipitor) 40 mg tablet 5/20/2025 Spouse/Significant Other Yes Yes   Sig: Take 1 tablet (40 mg) by mouth once daily.   blood sugar diagnostic (OneTouch Ultra Test)  Spouse/Significant Other     Sig: TEST ONCE DAILY   blood-glucose meter,continuous misc  Spouse/Significant Other     Sig: Use as instructed   docusate sodium (Colace) 100 mg capsule 5/20/2025 Spouse/Significant Other Yes Yes   Sig: Take 1 capsule (100 mg) by mouth once daily.   donepezil (Aricept) 10 mg tablet 5/20/2025 Spouse/Significant Other Yes Yes   Sig: Take 1 tablet (10 mg) by mouth once daily at bedtime.   glipiZIDE 2.5 mg tablet 5/20/2025 Spouse/Significant Other Yes Yes   Sig: Take 2.5 mg by mouth once daily.   mirtazapine (Remeron) 15 mg tablet 5/20/2025 Spouse/Significant Other Yes Yes   Sig: Take 1 tablet (15 mg) by mouth once daily at bedtime.   potassium chloride CR (Klor-Con M20) 20 mEq ER tablet 5/20/2025 Spouse/Significant Other Yes Yes   Sig: Take 1 tablet (20 mEq) by mouth once daily. Do not crush or chew.   warfarin (Coumadin) 5 mg tablet 5/20/2025 Spouse/Significant Other Yes Yes   Sig: Take 1 tablet (5 mg) by mouth once daily in the evening. Take as directed  per After Visit Summary. Do not fill before May 13, 2025.   warfarin (Coumadin) 7.5 mg tablet Not Taking Spouse/Significant Other No No   Sig: Take 1 tablet once in the evening for 1 dose.   Patient not taking: Reported on 5/21/2025      Facility-Administered Medications: None           The list below reflectives the updated allergy list. Please review each documented allergy for additional clarification and justification.  Allergies  Indicated as Unable to Assess by Erica Shearer RN on 5/21/2025 (Patient unable to communicate)        Severity Reactions Comments    Grass Pollen Not Specified Unknown             Below are additional concerns with the patient's PTA list.  Spouse unsure which medications are taken at what time    Flora Hollis

## 2025-05-21 NOTE — H&P
"Patient: Inge Clark   Age: 80 y.o.   Gender: female   [facility]     Attending: Becki Castro MD  Code Status:  DNR and No Intubation    Chief Complaint:  Patient: Inge Clark is a 80 y.o. female with PMHX vascular dementia, dysphagia, HTN/HLD, T2DM (4/2025 8.4%), and hypomagnesemia who presented to the hospital for abdominal pain and AMS      HPI  Per chart review, patient was brought to the ED by her family with concerns of patient having \"shaking\" today. It was reported that patient endorsed abdominal pain when going to the bathroom. Patient reported to be more confused than her baseline.     History is limited due to patient's vascular dementia and her  was not present at bedside by time of visit. She states she remembers this author from previous hospitalization. She denied chest pain, abdominal pain, SOB, nausea/vomiting, and falls. She states she is willing to eat and recognized her  had gone home and will visit the following day.     Of note, patient was recently hospitalized at [facility] (5/6-5/12) for failure to thrive. During that stay, the IVC/infrarenal thrombus was incidentally discovered. [facility], Dr. Ferris was consulted and did not think urgent intervention was indicated. He recommended to initiate anticoagulation and recommended outpatient US iliac duplex and outpatient follow up. Per chart review, this was unable to be completed. During previous admission, it was reported that patient did not qualify for hospice and the patient/ also declined pursuing hospice. Per chart review, patient has been unable to follow up at the [facility] due to being bedbound. On previous admission, it was noted by her  that she was able to ambulate with a rolling walker at home.         12 point review of systems negative except those listed in the HPI.    ED COURSE:  /98   Pulse 94   Temp 38.1 °C (100.6 °F)   Resp 22   Ht 1.6 m (5' " "3\")   Wt 56.7 kg (125 lb)   SpO2 98%   BMI 22.14 kg/m²     Imaging:  CXR: no acute findings  CT head: No acute findings  US DVT B/L: No DVT B/L  CT abdomen/pelvis: IVC/infrarenal thrombus - similar to previous      Pertinent Labs:  CMP: B, Na+: 136, K+: 6.1 -> 3.6, BUN: 18, Cr: 0.73 (baseline: <1),   Ma.32  Troponin: 5     BNP: 36  Lactate: 1.9  CBC: WBC: 15.0, Hgb: 12.6, Hct: 37.8, Plt: 100  PT/INR/aPTT: 45.0/4.1/40      Micro:   Blood cx: Pending  UA: Turbid, -nitrite +75 leuk est  , Urine cx: Pending  COVID: Negative  Flu A/B: Negative    Interventions:  - Tylenol 650 mg for fever  - Ceftriaxone 2 g  - Magnesium IV 2g           SUBJECTIVE:    ALLERGIES PAST MEDICAL HISTORY PAST SURGICAL SURGERY FAMILY HISTORY SOCIAL HISTORY   Allergies[1] Medical History[2] Surgical History[3] Family History[4] Social History[5]           MEDS:      HOME MEDS SCHEDULED MEDS PRN IV MEDS   Current Outpatient Medications   Medication Instructions    atenolol (TENORMIN) 50 mg, oral, Daily    atorvastatin (LIPITOR) 40 mg, oral, Daily    blood sugar diagnostic (OneTouch Ultra Test) TEST ONCE DAILY    blood-glucose meter,continuous misc Use as instructed    docusate sodium (COLACE) 100 mg, oral, Daily    donepezil (ARICEPT) 10 mg, oral, Nightly    FreeStyle Elsie 14 Day Sensor kit Use as instructed    glipiZIDE 2.5 mg, oral, Daily    mirtazapine (REMERON) 15 mg, oral, Nightly    potassium chloride CR (Klor-Con M20) 20 mEq ER tablet 20 mEq, oral, Daily, Do not crush or chew.    warfarin (Coumadin) 7.5 mg tablet Take 1 tablet once in the evening for 1 dose.    warfarin (COUMADIN) 5 mg, oral, Every evening, Take as directed per After Visit Summary.    Scheduled Medications[6] PRN Medications[7] Continuous Medications[8]               OBJECTIVE:  Physical Exam  Constitutional:       Appearance: She is ill-appearing.      Comments: Lethargic   Cardiovascular:      Rate and Rhythm: Normal rate and regular rhythm.      Pulses: " "Normal pulses.      Heart sounds: Normal heart sounds.   Pulmonary:      Effort: Pulmonary effort is normal.      Breath sounds: Normal breath sounds.   Abdominal:      Palpations: Abdomen is soft.      Tenderness: There is no abdominal tenderness. There is no guarding or rebound.   Musculoskeletal:      Right lower leg: No edema.      Left lower leg: No edema.   Skin:     General: Skin is warm and dry.   Neurological:      Mental Status: She is alert. She is disoriented.      Comments: AO x1, oriented to first name. Not oriented to last name, setting, and year.                   VITALS:  Vitals:    05/21/25 1401 05/21/25 1432 05/21/25 1433 05/21/25 1600   BP: (!) 113/99  109/58 104/89   BP Location: Left arm  Left arm    Patient Position: Sitting  Lying    Pulse: 95  86 83   Resp: 19  20 14   Temp: (!) 39.1 °C (102.4 °F) 36.9 °C (98.4 °F)     TempSrc: Temporal      SpO2: 100%  98%    Weight:       Height:             No intake or output data in the 24 hours ending 05/21/25 1712      LABS:     CBC:  Results from last 72 hours   Lab Units 05/21/25  1312   WBC AUTO x10*3/uL 15.0*   RBC AUTO x10*6/uL 4.22   HEMOGLOBIN g/dL 12.6   HEMATOCRIT % 37.8   MCV fL 90   MCH pg 29.9   MCHC g/dL 33.3   RDW % 14.3   PLATELETS AUTO x10*3/uL 100*     CMP:  Results from last 72 hours   Lab Units 05/21/25  1445 05/21/25  1312   SODIUM mmol/L  --  136   POTASSIUM mmol/L 3.6 6.1*   CHLORIDE mmol/L  --  101   CO2 mmol/L  --  23   ANION GAP mmol/L  --  18   BUN mg/dL  --  18   CREATININE mg/dL  --  0.73   EGFR mL/min/1.73m*2  --  84   GLUCOSE mg/dL  --  184*     Results from last 72 hours   Lab Units 05/21/25  1312   ALBUMIN g/dL 3.7   ALK PHOS U/L 103   ALT U/L 30   AST U/L 28   BILIRUBIN TOTAL mg/dL 0.9     Calcium/Phos:   Results from last 72 hours   Lab Units 05/21/25  1312   CALCIUM mg/dL 9.4      COAG:   Results from last 72 hours   Lab Units 05/21/25  1549   INR  4.1*     CRP: No results found for: \"CRP\"    ENDO:  Recent Labs     " 04/26/25  0715 04/25/25  0510 01/28/25  0000 03/29/24  1600   TSH  --   --  1.24 1.12   HGBA1C 8.4* 8.3* 7.7* 8.0*      CARDIAC:   Results from last 72 hours   Lab Units 05/21/25  1312   TROPHS ng/L 5       No data recorded    MICRO/ID:   No results found for the last 90 days.    Lab Results   Component Value Date    URINECULTURE  04/13/2025     Growth indicates contamination with periurethral howard. Repeat culture if clinically indicated.    BLOODCULT No growth at 4 days -  FINAL REPORT 05/06/2025    BLOODCULT No growth at 4 days -  FINAL REPORT 05/06/2025           IMAGING:     CT head wo IV contrast  Result Date: 5/21/2025  1. Within the aforementioned limitation no intracranial hemorrhage. 2. Mild age related changes again noted without acute intracranial process.   Signed by: Sandoval Feldman 5/21/2025 4:36 PM Dictation workstation:   [id number]    CT abdomen pelvis w IV contrast  Addendum Date: 5/21/2025  Interpreted By:  Sal Nicholson, ADDENDUM: Please note that the thrombus within the IVC is present on a prior CT of the chest, abdomen, and pelvis from 05/06/2025. My comparison was with a more remote study.   Signed by: Sal Nicholson 5/21/2025 3:53 PM   -------- ORIGINAL REPORT -------- Dictation workstation:   [id number]    Result Date: 5/21/2025  1. There is a large filling defect within the inferior vena cava extending from the level of the renal veins to the IVC bifurcation at the common iliac confluence, highly suspicious for a thrombus. This would certainly be at risk for migration to the pulmonary arterial tree. 2. Large amount of stool within the rectum with thickening of the presacral soft tissues as above of uncertain significance. Findings could relate to inflammatory changes of the presacral space in association with rectal fecal impaction. 3. No change in the cystic lesion within the pancreatic tail measuring 6 mm dating back to at least 2022. The stability suggests a benign etiology.      MACRO: Sal Nicholson communicated these findings by [employer] secure chat with LINDA VICTOR MANUEL on 5/21/2025 at 3:35 pm.  (**-RCF-**) Findings:  See findings.     Signed by: Sal Nicholson 5/21/2025 3:36 PM Dictation workstation:   [id number]    XR chest 1 view  Result Date: 5/21/2025  No evidence of acute intrathoracic abnormality.   Signed by: Gonzalez Reid 5/21/2025 1:57 PM Dictation workstation:   [id number]    Vascular US Lower Extremity Venous Duplex Bilateral  Result Date: 5/6/2025  No sonographic evidence for deep vein thrombosis within the evaluated veins of the bilateral lower extremity. 23 mm left popliteal fossa fluid collection.   MACRO: None   Signed by: Yemi Zambrano 5/6/2025 8:01 PM Dictation workstation:   [id number]    CT chest abdomen pelvis w IV contrast  Result Date: 5/6/2025  large partially occlusive thrombus within the IVC measuring approximately 11 cm in length extending from the iliocaval bifurcation and extending just above the renal veins. At most this result in 85% stenosis in the infrarenal cava.   No other acute process in the chest, abdomen, or pelvis.   Chronic compression fractures of L2 and L3 with 25% height loss, not significantly changed.   Chronic fracture of the inferior angle of the right scapula.   MACRO: Marek Trivedi discussed the significance and urgency of this critical finding via [employer] with confirmation of receipt with BRIANNA RUIZ on 5/6/2025 at 6:21 pm.  (**-RCF-**) Findings:  See findings.   Signed by: Marek Trivedi 5/6/2025 6:22 PM Dictation workstation:   [id number]    CT head wo IV contrast  Result Date: 5/6/2025  No acute intracranial abnormality.     MACRO: None.   Signed by: Marek Trivedi 5/6/2025 6:10 PM Dictation workstation:   [id number]    XR chest 2 views  Result Date: 5/6/2025  No acute cardiopulmonary process is evident.   MACRO: None   Signed by: Art Sullivan 5/6/2025 4:29 PM Dictation workstation:   [id number]              ASSESSMENT & PLAN  Inge Clark is a 80 y.o. female with PMHX vascular dementia, dysphagia, HTN/HLD, T2DM (4/2025 8.4%), and hypomagnesemia who presented to the hospital for abdominal pain and AMS    ACUTE MEDICAL ISSUES  #IVC/infrarenal thrombus   #Supratherapeutic INR  ::Similar to previous CT (5/2025)  - likely from sedentary lifestyle   - [facility] Dr. Wang evaluated during previous admission, recommended outpatient follow up and repeat US  - Recent warfarin initiation on previous admission.  - INR 4.1  Plan:  - HOLD home warfarin  - Vascular US IVC iliac duplex  - Consider consult [facility] pending results of US IVC  - PT/INR daily     #Constipation  #C/f stool impaction  ::Hx of poor oral intake ISO vascular dementia and failure to thrive.  - Home Colace  Plan:  - Lyudmila-colace 2 tablets nightly, Miralax BID, prune juice, and bisacodyl suppository x1  - May require more aggressive BM regimen or manual disimpaction    #Febrile  #Leukocytosis  #Pyuria   ::Ddx stool impaction vs UTI vs thrombus  - UA showed turbid, -nitrites, +75 leuk est  - No reported complaints of urinary symptoms  - Given Tylenol 650 mg x1 in ED -> fever broke  - WBC 15.0  Plan:  - Pending urine and blood cx  - Ceftriaxone (5/21-)  - Tylenol PRN for fever  - Consider blood cx if fever reoccurs         #Thrombocytopenia   ::Ddx: supratherapeutic INR vs reactive from acute illness  - No overt signs of bleeding.   - QTQGHG23 (5/7): negative  - Initial: 100 (improved from previous)  Plan:  - Continue to monitor.     #Hypomagnesemia  - s/p IV mag in ED  - Likely chronic ISO poor oral intake history  Plan:  - Monitor  - Replete PRN        CHRONIC MEDICAL ISSUES:  #Vascular dementia: Home mirtazapine 15 mg and Aricept 10 mg nightly  #HTN/HLD: continue atenolol 50mg and atorvastatin 40mg qhs  #T2DM: on glipizide 2.5mg at home. Currently on SSI #3  #Dysphagia: on modified diet. Aspiration precautions. SLP  previously evaluated - no dysphagia noted.         Fluids: PRN  Electrolytes: Replete PRN  Nutrition:Soft diet  GI Prophylaxis: None,  DVT Prophylaxis: None, Reason: supratherapeutic    Access: PIV  Antibiotics: Ceftriaxone (5/21)  Oxygenation: RA    Emergency Contact: Extended Emergency Contact Information  Primary Emergency Contact: Deacon Clark  Home Phone: [phone]  Relation: Spouse  Secondary Emergency Contact: Adriel Clark  Mobile Phone: [phone]  Relation: Son  Preferred language: English   needed? No    Dispo: Pending palliative. Length of stay anticipated to be LOS: < 48 hours.        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    Other conditions influencing health status 01/29/2018    Normal colonoscopy    Pyelonephritis of left kidney 04/11/2023    Type 2 diabetes mellitus with other specified complication 12/29/2022    DM type 2 with diabetic mixed hyperlipidemia    Unilateral primary osteoarthritis, right hip 12/09/2019    Primary osteoarthritis of right hip    Vitamin D deficiency, unspecified     Vitamin D deficiency   [3]   Past Surgical History:  Procedure Laterality Date    OTHER SURGICAL HISTORY  09/30/2019    Tonsillectomy with adenoidectomy   [4]   Family History  Problem Relation Name Age of Onset    Stroke Mother      Heart attack Mother      Hypertension Father     [5]   Social History  Tobacco Use    Smoking status: Never    Smokeless tobacco: Never   Vaping Use    Vaping status: Never Used   Substance Use Topics    Alcohol use: Yes     Alcohol/week: 1.0 standard drink of  alcohol     Types: 1 Standard drinks or equivalent per week     Comment: Socially    Drug use: Never   [6] magnesium sulfate, 2 g, intravenous, Once  nystatin, 1 Application, Topical, BID  [7] [8]

## 2025-05-22 ENCOUNTER — APPOINTMENT (OUTPATIENT)
Dept: GERIATRIC MEDICINE | Facility: CLINIC | Age: 80
End: 2025-05-22
Payer: MEDICARE

## 2025-05-22 ENCOUNTER — APPOINTMENT (OUTPATIENT)
Dept: VASCULAR MEDICINE | Facility: HOSPITAL | Age: 80
End: 2025-05-22
Payer: MEDICARE

## 2025-05-22 PROBLEM — R50.9 FEBRILE ILLNESS, ACUTE: Status: RESOLVED | Noted: 2025-05-21 | Resolved: 2025-05-22

## 2025-05-22 PROBLEM — I82.220: Status: ACTIVE | Noted: 2025-05-22

## 2025-05-22 LAB
ALBUMIN SERPL BCP-MCNC: 3.3 G/DL (ref 3.4–5)
ANION GAP SERPL CALC-SCNC: 16 MMOL/L (ref 10–20)
ATRIAL RATE: 93 BPM
BUN SERPL-MCNC: 18 MG/DL (ref 6–23)
CALCIUM SERPL-MCNC: 8.9 MG/DL (ref 8.6–10.3)
CHLORIDE SERPL-SCNC: 103 MMOL/L (ref 98–107)
CO2 SERPL-SCNC: 22 MMOL/L (ref 21–32)
CREAT SERPL-MCNC: 0.71 MG/DL (ref 0.5–1.05)
EGFRCR SERPLBLD CKD-EPI 2021: 87 ML/MIN/1.73M*2
ERYTHROCYTE [DISTWIDTH] IN BLOOD BY AUTOMATED COUNT: 14.4 % (ref 11.5–14.5)
GLUCOSE BLD MANUAL STRIP-MCNC: 122 MG/DL (ref 74–99)
GLUCOSE BLD MANUAL STRIP-MCNC: 150 MG/DL (ref 74–99)
GLUCOSE BLD MANUAL STRIP-MCNC: 175 MG/DL (ref 74–99)
GLUCOSE BLD MANUAL STRIP-MCNC: 178 MG/DL (ref 74–99)
GLUCOSE BLD MANUAL STRIP-MCNC: 180 MG/DL (ref 74–99)
GLUCOSE BLD MANUAL STRIP-MCNC: 207 MG/DL (ref 74–99)
GLUCOSE SERPL-MCNC: 177 MG/DL (ref 74–99)
HCT VFR BLD AUTO: 36.9 % (ref 36–46)
HGB BLD-MCNC: 11.7 G/DL (ref 12–16)
HOLD SPECIMEN: NORMAL
INR PPP: 4.1 (ref 0.9–1.1)
MAGNESIUM SERPL-MCNC: 1.78 MG/DL (ref 1.6–2.4)
MCH RBC QN AUTO: 30 PG (ref 26–34)
MCHC RBC AUTO-ENTMCNC: 31.7 G/DL (ref 32–36)
MCV RBC AUTO: 95 FL (ref 80–100)
NRBC BLD-RTO: 0 /100 WBCS (ref 0–0)
P AXIS: 62 DEGREES
P OFFSET: 179 MS
P ONSET: 143 MS
PHOSPHATE SERPL-MCNC: 3.3 MG/DL (ref 2.5–4.9)
PLATELET # BLD AUTO: 86 X10*3/UL (ref 150–450)
POTASSIUM SERPL-SCNC: 3.8 MMOL/L (ref 3.5–5.3)
PR INTERVAL: 150 MS
PROTHROMBIN TIME: 45.5 SECONDS (ref 9.8–12.4)
Q ONSET: 218 MS
QRS COUNT: 16 BEATS
QRS DURATION: 78 MS
QT INTERVAL: 344 MS
QTC CALCULATION(BAZETT): 427 MS
QTC FREDERICIA: 398 MS
R AXIS: 104 DEGREES
RBC # BLD AUTO: 3.9 X10*6/UL (ref 4–5.2)
SODIUM SERPL-SCNC: 137 MMOL/L (ref 136–145)
T AXIS: 59 DEGREES
T OFFSET: 390 MS
VENTRICULAR RATE: 93 BPM
WBC # BLD AUTO: 8.1 X10*3/UL (ref 4.4–11.3)

## 2025-05-22 PROCEDURE — 93978 VASCULAR STUDY: CPT | Performed by: SURGERY

## 2025-05-22 PROCEDURE — 82947 ASSAY GLUCOSE BLOOD QUANT: CPT

## 2025-05-22 PROCEDURE — 99232 SBSQ HOSP IP/OBS MODERATE 35: CPT | Performed by: BEHAVIOR TECHNICIAN

## 2025-05-22 PROCEDURE — 2500000002 HC RX 250 W HCPCS SELF ADMINISTERED DRUGS (ALT 637 FOR MEDICARE OP, ALT 636 FOR OP/ED)

## 2025-05-22 PROCEDURE — 80069 RENAL FUNCTION PANEL: CPT | Performed by: BEHAVIOR TECHNICIAN

## 2025-05-22 PROCEDURE — 36415 COLL VENOUS BLD VENIPUNCTURE: CPT | Performed by: BEHAVIOR TECHNICIAN

## 2025-05-22 PROCEDURE — G0378 HOSPITAL OBSERVATION PER HR: HCPCS

## 2025-05-22 PROCEDURE — 94760 N-INVAS EAR/PLS OXIMETRY 1: CPT

## 2025-05-22 PROCEDURE — 2500000001 HC RX 250 WO HCPCS SELF ADMINISTERED DRUGS (ALT 637 FOR MEDICARE OP)

## 2025-05-22 PROCEDURE — 83735 ASSAY OF MAGNESIUM: CPT | Performed by: BEHAVIOR TECHNICIAN

## 2025-05-22 PROCEDURE — 85610 PROTHROMBIN TIME: CPT | Performed by: BEHAVIOR TECHNICIAN

## 2025-05-22 PROCEDURE — 2500000004 HC RX 250 GENERAL PHARMACY W/ HCPCS (ALT 636 FOR OP/ED): Performed by: BEHAVIOR TECHNICIAN

## 2025-05-22 PROCEDURE — 2500000001 HC RX 250 WO HCPCS SELF ADMINISTERED DRUGS (ALT 637 FOR MEDICARE OP): Performed by: BEHAVIOR TECHNICIAN

## 2025-05-22 PROCEDURE — 2500000002 HC RX 250 W HCPCS SELF ADMINISTERED DRUGS (ALT 637 FOR MEDICARE OP, ALT 636 FOR OP/ED): Performed by: BEHAVIOR TECHNICIAN

## 2025-05-22 PROCEDURE — 93978 VASCULAR STUDY: CPT

## 2025-05-22 PROCEDURE — 85027 COMPLETE CBC AUTOMATED: CPT | Performed by: BEHAVIOR TECHNICIAN

## 2025-05-22 RX ORDER — EAR PLUGS
1 EACH OTIC (EAR) 2 TIMES DAILY
Status: DISCONTINUED | OUTPATIENT
Start: 2025-05-22 | End: 2025-05-23 | Stop reason: HOSPADM

## 2025-05-22 RX ORDER — INSULIN LISPRO 100 [IU]/ML
0-20 INJECTION, SOLUTION INTRAVENOUS; SUBCUTANEOUS
Status: DISCONTINUED | OUTPATIENT
Start: 2025-05-22 | End: 2025-05-23 | Stop reason: HOSPADM

## 2025-05-22 RX ORDER — LANOLIN ALCOHOL/MO/W.PET/CERES
400 CREAM (GRAM) TOPICAL ONCE
Status: COMPLETED | OUTPATIENT
Start: 2025-05-22 | End: 2025-05-22

## 2025-05-22 RX ORDER — BISACODYL 10 MG/1
10 SUPPOSITORY RECTAL ONCE
Status: COMPLETED | OUTPATIENT
Start: 2025-05-22 | End: 2025-05-22

## 2025-05-22 RX ADMIN — ATENOLOL 50 MG: 50 TABLET ORAL at 09:17

## 2025-05-22 RX ADMIN — POLYETHYLENE GLYCOL 3350 17 G: 17 POWDER, FOR SOLUTION ORAL at 09:17

## 2025-05-22 RX ADMIN — Medication 1 TABLET: at 15:19

## 2025-05-22 RX ADMIN — NYSTATIN 1 APPLICATION: 100000 POWDER TOPICAL at 21:57

## 2025-05-22 RX ADMIN — SENNOSIDES AND DOCUSATE SODIUM 2 TABLET: 50; 8.6 TABLET ORAL at 21:57

## 2025-05-22 RX ADMIN — INSULIN LISPRO 3 UNITS: 100 INJECTION, SOLUTION INTRAVENOUS; SUBCUTANEOUS at 09:25

## 2025-05-22 RX ADMIN — DONEPEZIL HYDROCHLORIDE 10 MG: 5 TABLET ORAL at 21:57

## 2025-05-22 RX ADMIN — INSULIN LISPRO 6 UNITS: 100 INJECTION, SOLUTION INTRAVENOUS; SUBCUTANEOUS at 12:09

## 2025-05-22 RX ADMIN — ATORVASTATIN CALCIUM 40 MG: 40 TABLET, FILM COATED ORAL at 21:57

## 2025-05-22 RX ADMIN — MIRTAZAPINE 15 MG: 15 TABLET, FILM COATED ORAL at 21:57

## 2025-05-22 RX ADMIN — POTASSIUM CHLORIDE 20 MEQ: 1500 TABLET, EXTENDED RELEASE ORAL at 09:17

## 2025-05-22 RX ADMIN — Medication 1 APPLICATION: at 15:19

## 2025-05-22 RX ADMIN — BISACODYL 10 MG: 10 SUPPOSITORY RECTAL at 15:20

## 2025-05-22 ASSESSMENT — COGNITIVE AND FUNCTIONAL STATUS - GENERAL
TOILETING: A LOT
HELP NEEDED FOR BATHING: A LOT
DRESSING REGULAR LOWER BODY CLOTHING: A LOT
HELP NEEDED FOR BATHING: A LOT
MOVING TO AND FROM BED TO CHAIR: A LOT
DRESSING REGULAR UPPER BODY CLOTHING: A LOT
CLIMB 3 TO 5 STEPS WITH RAILING: A LOT
DAILY ACTIVITIY SCORE: 12
DRESSING REGULAR UPPER BODY CLOTHING: A LOT
WALKING IN HOSPITAL ROOM: A LOT
TURNING FROM BACK TO SIDE WHILE IN FLAT BAD: A LOT
PERSONAL GROOMING: A LOT
STANDING UP FROM CHAIR USING ARMS: A LOT
MOBILITY SCORE: 12
PERSONAL GROOMING: A LOT
EATING MEALS: A LOT
MOVING FROM LYING ON BACK TO SITTING ON SIDE OF FLAT BED WITH BEDRAILS: A LOT
TOILETING: A LOT
MOVING FROM LYING ON BACK TO SITTING ON SIDE OF FLAT BED WITH BEDRAILS: A LOT
MOBILITY SCORE: 12
MOVING TO AND FROM BED TO CHAIR: A LOT
STANDING UP FROM CHAIR USING ARMS: A LOT
EATING MEALS: A LOT
CLIMB 3 TO 5 STEPS WITH RAILING: A LOT
TURNING FROM BACK TO SIDE WHILE IN FLAT BAD: A LOT
WALKING IN HOSPITAL ROOM: A LOT
DRESSING REGULAR LOWER BODY CLOTHING: A LOT
DAILY ACTIVITIY SCORE: 12

## 2025-05-22 ASSESSMENT — PAIN SCALES - GENERAL
PAINLEVEL_OUTOF10: 0 - NO PAIN
PAINLEVEL_OUTOF10: 0 - NO PAIN

## 2025-05-22 NOTE — CONSULTS
Wound Care Consult     Visit Date: 5/22/2025      Patient Name: Inge Clark         MRN: [id number]             Reason for Consult:  Skin changes to skin folds and buttocks /sacrum     Wound History:   Unknown, present on admission    Assessment:  Consult received, EMR and images reviewed which reveal shallow, dry areas of MASD along skin folds of flank and to buttocks /sacral area where skin is pink, very dry and scaly and in need of protection. Recommend zinc oxide barrier, orders obtained.        Wound Plan:   Zinc oxide      Michael Davis RN, WCC, DWC  5/22/2025  1:25 PM

## 2025-05-22 NOTE — ED PROCEDURE NOTE
Procedure  Critical Care    Performed by: Marek Chance MD  Authorized by: Marek Chance MD    Critical care provider statement:     Critical care time (minutes):  35    Critical care time was exclusive of:  Separately billable procedures and treating other patients and teaching time    Critical care was necessary to treat or prevent imminent or life-threatening deterioration of the following conditions:  Circulatory failure    Critical care was time spent personally by me on the following activities:  Development of treatment plan with patient or surrogate, discussions with consultants, evaluation of patient's response to treatment, obtaining history from patient or surrogate, pulse oximetry, ordering and review of radiographic studies, ordering and review of laboratory studies and ordering and performing treatments and interventions    Care discussed with: admitting provider                 Marek Chance MD  05/21/25 1171

## 2025-05-22 NOTE — CARE PLAN
The patient's goals for the shift include  remain comfortable    The clinical goals for the shift include remain alert and oriented        Problem: Fall/Injury  Goal: Not fall by end of shift  Outcome: Progressing     Problem: Fall/Injury  Goal: Verbalize understanding of personal risk factors for fall in the hospital  Outcome: Progressing

## 2025-05-22 NOTE — PROGRESS NOTES
Patient: Inge Clark   Age: 80 y.o.   Gender: female   Room/Bed: 220/220-A     Attending: Becki Castro MD  Code Status:  DNR and No Intubation  Admitted Date: 5/21/2025 12:51 PM    Chief Complaint:  Patient: Inge Clark is a 80 y.o. female with PMHX  vascular dementia, dysphagia, HTN/HLD, T2DM (4/2025 8.4%), and hypomagnesemia who presented to the hospital for abdominal pain and AMS     OVERNIGHT: No significant events reported.      SUBJECTIVE:  Patient was undergoing US at the time of initial evaluation. Patient's  states that she appears better compared to yesterday when she presented. He expressed that he and his family are still in the process of getting placement at a memory care unit. He was expressing frustration that she requires frequent care but he is having difficulty doing so.     On re evaluation, patient states she is doing well and has no acute complaint or concerns. She states she is able to eat and denies abdominal pain, chest pain, SOB, and nausea/vomiting.            ALLERGIES PAST MEDICAL HISTORY PAST SURGICAL SURGERY FAMILY HISTORY SOCIAL HISTORY   Allergies[1] Medical History[2] Surgical History[3] Family History[4] Social History[5]           MEDS:      HOME MEDS SCHEDULED MEDS PRN IV MEDS   Current Outpatient Medications   Medication Instructions    atenolol (TENORMIN) 50 mg, oral, Daily    atorvastatin (LIPITOR) 40 mg, oral, Daily    blood sugar diagnostic (OneTouch Ultra Test) TEST ONCE DAILY    blood-glucose meter,continuous misc Use as instructed    docusate sodium (COLACE) 100 mg, oral, Daily    donepezil (ARICEPT) 10 mg, oral, Nightly    FreeStyle Elsie 14 Day Sensor kit Use as instructed    glipiZIDE 2.5 mg, oral, Daily    melatonin 3 mg, Nightly    mirtazapine (REMERON) 15 mg, oral, Nightly    potassium chloride CR (Klor-Con M20) 20 mEq ER tablet 20 mEq, oral, Daily, Do not crush or chew.    warfarin (COUMADIN) 5 mg, oral, Every evening, Take as directed per  After Visit Summary.    Scheduled Medications[6] PRN Medications[7] Continuous Medications[8]               OBJECTIVE:  Physical Exam  Constitutional:       Appearance: Normal appearance.   Cardiovascular:      Rate and Rhythm: Normal rate and regular rhythm.      Pulses: Normal pulses.      Heart sounds: Normal heart sounds.   Pulmonary:      Effort: Pulmonary effort is normal.      Breath sounds: Normal breath sounds.   Abdominal:      Palpations: Abdomen is soft.      Tenderness: There is no abdominal tenderness. There is no guarding or rebound.   Musculoskeletal:      Right lower leg: No edema.      Left lower leg: No edema.   Skin:     General: Skin is warm and dry.   Neurological:      Mental Status: She is alert. Mental status is at baseline.   Psychiatric:         Mood and Affect: Mood normal.         Behavior: Behavior normal.                  VITALS:  Vitals:    05/21/25 1935 05/21/25 2100 05/22/25 0400 05/22/25 0852   BP: 118/73  118/68    BP Location: Right arm  Right arm    Patient Position: Sitting  Sitting    Pulse: 80  78    Resp: 18  15    Temp: 36.6 °C (97.9 °F)  36 °C (96.8 °F)    TempSrc:       SpO2: 97% 96% 97% 98%   Weight:       Height:               Intake/Output Summary (Last 24 hours) at 5/22/2025 1307  Last data filed at 5/22/2025 1114  Gross per 24 hour   Intake 90 ml   Output --   Net 90 ml         LABS:     Lab Results   Component Value Date    WBC 8.1 05/22/2025    HGB 11.7 (L) 05/22/2025    HCT 36.9 05/22/2025    MCV 95 05/22/2025    PLT 86 (L) 05/22/2025     Lab Results   Component Value Date    GLUCOSE 177 (H) 05/22/2025    CALCIUM 8.9 05/22/2025     05/22/2025    K 3.8 05/22/2025    CO2 22 05/22/2025     05/22/2025    BUN 18 05/22/2025    CREATININE 0.71 05/22/2025     HEMOGLOBIN A1c   Date Value Ref Range Status   01/28/2025 7.7 (H) <5.7 % of total Hgb Final     Comment:     For someone without known diabetes, a hemoglobin A1c  value of 6.5% or greater indicates that  they may have   diabetes and this should be confirmed with a follow-up   test.     For someone with known diabetes, a value <7% indicates   that their diabetes is well controlled and a value   greater than or equal to 7% indicates suboptimal   control. A1c targets should be individualized based on   duration of diabetes, age, comorbid conditions, and   other considerations.     Currently, no consensus exists regarding use of  hemoglobin A1c for diagnosis of diabetes for children.           Hemoglobin A1C   Date Value Ref Range Status   04/26/2025 8.4 (H) 4.3 - 5.6 % Final     Comment:     American Diabetes Association guidelines indicate that patients with HgbA1c in the range 5.7-6.4% are at increased risk for development of diabetes, and intervention by lifestyle modification may be beneficial. HgbA1c greater or equal to 6.5% is considered diagnostic of diabetes.   04/25/2025 8.3 (H) 4.3 - 5.6 % Final     Comment:     American Diabetes Association guidelines indicate that patients with HgbA1c in the range 5.7-6.4% are at increased risk for development of diabetes, and intervention by lifestyle modification may be beneficial. HgbA1c greater or equal to 6.5% is considered diagnostic of diabetes.     TSH W/REFLEX TO FT4   Date Value Ref Range Status   01/28/2025 1.24 0.40 - 4.50 mIU/L Final     Thyroid Stimulating Hormone   Date Value Ref Range Status   03/29/2024 1.12 0.44 - 3.98 mIU/L Final     Vitamin D, 25-Hydroxy, Total   Date Value Ref Range Status   03/29/2024 36 30 - 100 ng/mL Final       MICRO/ID:   No results found for the last 90 days.      Lab Results   Component Value Date    URINECULTURE  04/13/2025     Growth indicates contamination with periurethral howard. Repeat culture if clinically indicated.    BLOODCULT Loaded on Instrument - Culture in progress 05/21/2025    BLOODCULT Loaded on Instrument - Culture in progress 05/21/2025         NUTRITION STATUS:           IMAGING:     Imaging  Vascular US lower  extremity venous duplex bilateral  Result Date: 5/21/2025  No deep venous thrombosis of the  bilateral lower extremity.   MACRO: None   Signed by: Nick Wilkinson 5/21/2025 5:11 PM Dictation workstation:   [id number]    CT head wo IV contrast  Result Date: 5/21/2025  1. Within the aforementioned limitation no intracranial hemorrhage. 2. Mild age related changes again noted without acute intracranial process.   Signed by: Sandoval Feldman 5/21/2025 4:36 PM Dictation workstation:   [id number]    CT abdomen pelvis w IV contrast  Addendum Date: 5/21/2025  Interpreted By:  Sal Nicholson, ADDENDUM: Please note that the thrombus within the IVC is present on a prior CT of the chest, abdomen, and pelvis from 05/06/2025. My comparison was with a more remote study.   Signed by: Sal Nicholson 5/21/2025 3:53 PM   -------- ORIGINAL REPORT -------- Dictation workstation:   [id number]    Result Date: 5/21/2025  1. There is a large filling defect within the inferior vena cava extending from the level of the renal veins to the IVC bifurcation at the common iliac confluence, highly suspicious for a thrombus. This would certainly be at risk for migration to the pulmonary arterial tree. 2. Large amount of stool within the rectum with thickening of the presacral soft tissues as above of uncertain significance. Findings could relate to inflammatory changes of the presacral space in association with rectal fecal impaction. 3. No change in the cystic lesion within the pancreatic tail measuring 6 mm dating back to at least 2022. The stability suggests a benign etiology.     MACRO: Sal Nicholson communicated these findings by Ep[employer] secure chat with LINDA RUSH on 5/21/2025 at 3:35 pm.  (**-RCF-**) Findings:  See findings.     Signed by: Sal Nicholson 5/21/2025 3:36 PM Dictation workstation:   [id number]    XR chest 1 view  Result Date: 5/21/2025  No evidence of acute intrathoracic abnormality.   Signed by: Gonzalez Reid 5/21/2025 1:57 PM  Dictation workstation:   [id number]      Cardiology, Vascular, and Other Imaging  Vascular US IVC iliac duplex complete  Result Date: 5/22/2025          [place] [id number]  Tel [phone] and Fax [phone]  Vascular Lab Report VAS US IVC ILIAC DUPLEX COMPLETE  Patient Name:      JENNIFER FLANAGAN   Giana Physician: [id number] Anthony Bradford DO Study Date:        5/22/2025          Ordering           [id number] BRINA MELÉNDEZ                                       Physician:         AUTUMN MRN/PID:           [id number]           Technologist:      Kylie Isaac RVT Accession#:        [id number]       Technologist 2: Date of Birth/Age: 1945 / 80      Encounter#:        [id number]                    years Gender:            F Admission Status:  Inpatient          Location           [facility]                                       Performed:  Diagnosis/ICD: Acute embolism and thrombosis of inferior vena cava-I82.220 CPT Codes:     24516 Duplex Aorta/IVC/Iliac/Bypass Graft  **CRITICAL RESULT** Critical Result: Thrombus in IVC Notification called to Dr. Brina Castro on 5/22/2025 at 8:51:09 AM. Acknowledged critical results notification communicated via Epic Chat by Kylie Isaac RVT.  CONCLUSIONS: Aorta/Common Iliac Arteries/IVC: There is acute non-occlusive deep vein thrombus noted in the proximal, mid, and distal Inferior Vena Cava. The right common iliac appears negative for DVT. Continuous flow noted at the proximal and mid segments. The left common iliac vein appears negative for DVT. Continuous flow noted in the proximal and mid segments. The bilateral external iliac vein appears negative for DVT with spontaneous and phasic flow. The bilateral common femoral vein appear to negative for DVT with spontaneous and phasic flow.  Imaging & Doppler Findings:  Right        Flow CFV   Spontaneous/Phasic  Left        Flow CFV  Spontaneous/Phasic  [id number] Anthony  Sanchez CARDONA Electronically signed by [id number] Anthony Bradford DO on 5/22/2025 at 9:56:33 AM  ** Final **              ASSESSMENT & PLAN  Inge Clark is a 80 y.o. female with PMHX vascular dementia, dysphagia, HTN/HLD, T2DM (4/2025 8.4%), and hypomagnesemia who presented to the hospital for abdominal pain and AMS     UPDATES FOR 05/22/25  :  - Palliative going to set up family meeting in afternoon  -     ACUTE MEDICAL ISSUES  #IVC/infrarenal thrombus   #Supratherapeutic INR  ::Similar to previous CT (5/2025)  - likely from sedentary lifestyle   - Endovascular [facility] Dr. Wang evaluated during previous admission, recommended outpatient follow up and repeat US  - Recent warfarin initiation on previous admission.  - INR 4.1  Plan:  - HOLD home warfarin  - Vascular US IVC iliac duplex showed IVC/infrarenal thrombus  - Will not need Endovascular reconsult given low suspicion for intervention need.  - PT/INR daily     #Constipation  #C/f stool impaction  ::Hx of poor oral intake ISO vascular dementia and failure to thrive.  - Home Colace  Plan:  - Lyudmila-colace 2 tablets nightly, Miralax BID, prune juice, and bisacodyl suppository x1  - May require more aggressive BM regimen or manual disimpaction     #Febrile - resolved  #Leukocytosis - improved  #Pyuria   ::Ddx stool impaction vs UTI vs thrombus  - UA showed turbid, -nitrites, +75 leuk est  - No reported complaints of urinary symptoms  - Given Tylenol 650 mg x1 in ED -> fever broke  - WBC 15.0  Plan:  - Pending urine and blood cx  - Ceftriaxone (5/21-5/22), low suspicion for infectious etiology.  - Tylenol PRN for fever  - Consider blood cx if fever reoccurs        #Thrombocytopenia - worsening  ::Ddx: supratherapeutic INR vs reactive from acute illness  - No overt signs of bleeding.   - SFVLAB09 (5/7): negative  - Initial: 100 (improved from previous)  Plan:  - Continue to monitor.      #Hypomagnesemia - improved  - s/p IV mag in ED  - Likely chronic ISO poor oral  intake history  Plan:  - Monitor  - Replete PRN    RESOLVED MEDICAL ISSUES:      CHRONIC MEDICAL ISSUES:  #Vascular dementia: Home mirtazapine 15 mg and Aricept 10 mg nightly  #HTN/HLD: continue atenolol 50mg and atorvastatin 40mg qhs  #T2DM: on glipizide 2.5mg at home. Currently on SSI #3  #Dysphagia: on modified diet. Aspiration precautions. SLP previously evaluated - no dysphagia noted.         Fluids: PRN  Electrolytes: Replete PRN  Nutrition:Soft diet  GI Prophylaxis: None  DVT Prophylaxis: None, Reason: supratherapeutic  BM:      Access: PIV  Antibiotics: Ceftriaxone (5/21-5/22)  Oxygenation: RA    Emergency Contact: Extended Emergency Contact Information  Primary Emergency Contact: Deacon Clark  Home Phone: [phone]  Relation: Spouse  Secondary Emergency Contact: Adriel Clark  Mobile Phone: [phone]  Relation: Son  Preferred language: English   needed? No    Dispo: Pending palliative and pending medical clearance.         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    Listeria infection    Other conditions influencing health status 01/29/2018    Normal colonoscopy    Pyelonephritis of left kidney 04/11/2023    Type 2 diabetes mellitus with other specified complication 12/29/2022    DM type 2 with diabetic mixed hyperlipidemia    Unilateral primary osteoarthritis, right hip 12/09/2019    Primary osteoarthritis of right hip    Vitamin D deficiency, unspecified     Vitamin D deficiency   [3]   Past Surgical History:  Procedure Laterality Date    OTHER SURGICAL HISTORY  09/30/2019    Tonsillectomy with adenoidectomy    [4]   Family History  Problem Relation Name Age of Onset    Stroke Mother      Heart attack Mother      Hypertension Father     [5]   Social History  Tobacco Use    Smoking status: Never    Smokeless tobacco: Never   Vaping Use    Vaping status: Never Used   Substance Use Topics    Alcohol use: Yes     Alcohol/week: 1.0 standard drink of alcohol     Types: 1 Standard drinks or equivalent per week     Comment: Socially    Drug use: Never   [6] atenolol, 50 mg, oral, Daily  atorvastatin, 40 mg, oral, Daily  donepezil, 10 mg, oral, Nightly  insulin lispro, 0-15 Units, subcutaneous, Before meals & nightly  [Held by provider] melatonin, 3 mg, oral, Nightly  mirtazapine, 15 mg, oral, Nightly  nystatin, 1 Application, Topical, BID  polyethylene glycol, 17 g, oral, BID  potassium chloride CR, 20 mEq, oral, Daily  sennosides-docusate sodium, 2 tablet, oral, Nightly  [Held by provider] warfarin, 5 mg, oral, Daily  [7] PRN medications: acetaminophen **OR** [DISCONTINUED] acetaminophen **OR** [DISCONTINUED] acetaminophen, dextrose, dextrose, glucagon, glucagon, melatonin, ondansetron **OR** ondansetron  [8]

## 2025-05-22 NOTE — HOSPITAL COURSE
"Brief HPI:  80 y.o. female with PMHX  vascular dementia, dysphagia, HTN/HLD, T2DM (4/2025 8.4%), and hypomagnesemia who presented to the hospital for abdominal pain and AMS     Per chart review, patient was brought to the ED by her family with concerns of patient having \"shaking\" today. It was reported that patient endorsed abdominal pain when going to the bathroom. Patient reported to be more confused than her baseline.     ED Course:  Patient had a temp of 100.6 F and a RR 22. CT a/p showed IVC/infrarenal thrombus that was seen on previous imaging and presence of significant amount of stool. CXR, CT head, and US DVT B/L were all negative. Labs significant for low magnesium, WBC 15.0, Plt 100, and INR 4.1. UA showed turbid urine with mild +leuk est and negative nitrites. While in the ED, was given Tylenol 650 mg x1 for fever, started on ceftriaxone for presumed UTI, and mag repletion.        Floors:  US iliac IVC showed presence of thrombus, appears to have unchanged. Endovascular not needed for acute intervention. Home warfarin continued to be held due to supratherapeutic INR. No reoccurrence of fever and leukocytosis resolved, d/c abx for low suspicion of infectious etiology. Aggressive BM regimen in place and patient was able to have a BM. Throughout her stay, she clinically improved and returned to her mental baseline per . Palliative was consulted and family expressed not wanting to pursue hospice at this time. Patient medically stable for discharge to LTC with nutrition referral and reduced warfarin to 3 mg.     "

## 2025-05-22 NOTE — NURSING NOTE
Patient is A&Ox1 to self. Call light in reach. Patient denies pain and is normal sinus on monitor @ 80bpm. Patient is on room air.

## 2025-05-22 NOTE — SIGNIFICANT EVENT
Palliative Care Social Work Note     Attempted to meet with spouse this AM, he had left the hospital.  Spoke with spouse, he stated he would be back by 1400.  Received message that spouse called and would not be back at the hospital before 1500, am unable to meet at that time.  Left message spouse requesting meeting 5/23, AM if possible.   Team updated.

## 2025-05-23 VITALS
HEIGHT: 63 IN | SYSTOLIC BLOOD PRESSURE: 119 MMHG | HEART RATE: 71 BPM | OXYGEN SATURATION: 95 % | DIASTOLIC BLOOD PRESSURE: 66 MMHG | TEMPERATURE: 97.7 F | WEIGHT: 125 LBS | RESPIRATION RATE: 16 BRPM | BODY MASS INDEX: 22.15 KG/M2

## 2025-05-23 DIAGNOSIS — I82.220: ICD-10-CM

## 2025-05-23 PROBLEM — E83.42 HYPOMAGNESEMIA: Status: RESOLVED | Noted: 2024-08-15 | Resolved: 2025-05-23

## 2025-05-23 PROBLEM — K59.00 CONSTIPATION: Status: RESOLVED | Noted: 2025-05-23 | Resolved: 2025-05-23

## 2025-05-23 PROBLEM — R10.9 ABDOMINAL PAIN: Status: RESOLVED | Noted: 2025-05-21 | Resolved: 2025-05-23

## 2025-05-23 PROBLEM — L89.159 PRESSURE INJURY OF SKIN OF SACRAL REGION: Status: RESOLVED | Noted: 2025-05-23 | Resolved: 2025-05-23

## 2025-05-23 PROBLEM — L89.159 PRESSURE INJURY OF SKIN OF SACRAL REGION: Status: ACTIVE | Noted: 2025-05-23

## 2025-05-23 PROBLEM — K59.00 CONSTIPATION: Status: ACTIVE | Noted: 2025-05-23

## 2025-05-23 LAB
ALBUMIN SERPL BCP-MCNC: 3.2 G/DL (ref 3.4–5)
ANION GAP SERPL CALC-SCNC: 14 MMOL/L (ref 10–20)
BACTERIA UR CULT: NO GROWTH
BUN SERPL-MCNC: 20 MG/DL (ref 6–23)
CALCIUM SERPL-MCNC: 8.8 MG/DL (ref 8.6–10.3)
CHLORIDE SERPL-SCNC: 104 MMOL/L (ref 98–107)
CO2 SERPL-SCNC: 24 MMOL/L (ref 21–32)
CREAT SERPL-MCNC: 0.65 MG/DL (ref 0.5–1.05)
EGFRCR SERPLBLD CKD-EPI 2021: 90 ML/MIN/1.73M*2
ERYTHROCYTE [DISTWIDTH] IN BLOOD BY AUTOMATED COUNT: 14.1 % (ref 11.5–14.5)
GLUCOSE BLD MANUAL STRIP-MCNC: 148 MG/DL (ref 74–99)
GLUCOSE BLD MANUAL STRIP-MCNC: 164 MG/DL (ref 74–99)
GLUCOSE BLD MANUAL STRIP-MCNC: 171 MG/DL (ref 74–99)
GLUCOSE BLD MANUAL STRIP-MCNC: 189 MG/DL (ref 74–99)
GLUCOSE SERPL-MCNC: 188 MG/DL (ref 74–99)
HCT VFR BLD AUTO: 32.4 % (ref 36–46)
HGB BLD-MCNC: 10.8 G/DL (ref 12–16)
HOLD SPECIMEN: NORMAL
HOLD SPECIMEN: NORMAL
INR PPP: 2.6 (ref 0.9–1.1)
MAGNESIUM SERPL-MCNC: 1.64 MG/DL (ref 1.6–2.4)
MCH RBC QN AUTO: 29.6 PG (ref 26–34)
MCHC RBC AUTO-ENTMCNC: 33.3 G/DL (ref 32–36)
MCV RBC AUTO: 89 FL (ref 80–100)
NRBC BLD-RTO: 0 /100 WBCS (ref 0–0)
PHOSPHATE SERPL-MCNC: 3.5 MG/DL (ref 2.5–4.9)
PLATELET # BLD AUTO: 91 X10*3/UL (ref 150–450)
POTASSIUM SERPL-SCNC: 3.7 MMOL/L (ref 3.5–5.3)
PROTHROMBIN TIME: 28.4 SECONDS (ref 9.8–12.4)
RBC # BLD AUTO: 3.65 X10*6/UL (ref 4–5.2)
SODIUM SERPL-SCNC: 138 MMOL/L (ref 136–145)
WBC # BLD AUTO: 7.8 X10*3/UL (ref 4.4–11.3)

## 2025-05-23 PROCEDURE — 36415 COLL VENOUS BLD VENIPUNCTURE: CPT | Performed by: BEHAVIOR TECHNICIAN

## 2025-05-23 PROCEDURE — 83735 ASSAY OF MAGNESIUM: CPT | Performed by: BEHAVIOR TECHNICIAN

## 2025-05-23 PROCEDURE — 80069 RENAL FUNCTION PANEL: CPT | Performed by: BEHAVIOR TECHNICIAN

## 2025-05-23 PROCEDURE — 99238 HOSP IP/OBS DSCHRG MGMT 30/<: CPT | Performed by: BEHAVIOR TECHNICIAN

## 2025-05-23 PROCEDURE — 85610 PROTHROMBIN TIME: CPT | Performed by: BEHAVIOR TECHNICIAN

## 2025-05-23 PROCEDURE — 2500000002 HC RX 250 W HCPCS SELF ADMINISTERED DRUGS (ALT 637 FOR MEDICARE OP, ALT 636 FOR OP/ED)

## 2025-05-23 PROCEDURE — 2500000004 HC RX 250 GENERAL PHARMACY W/ HCPCS (ALT 636 FOR OP/ED): Performed by: BEHAVIOR TECHNICIAN

## 2025-05-23 PROCEDURE — 82947 ASSAY GLUCOSE BLOOD QUANT: CPT | Mod: 59

## 2025-05-23 PROCEDURE — 2500000001 HC RX 250 WO HCPCS SELF ADMINISTERED DRUGS (ALT 637 FOR MEDICARE OP)

## 2025-05-23 PROCEDURE — G0378 HOSPITAL OBSERVATION PER HR: HCPCS

## 2025-05-23 PROCEDURE — 85027 COMPLETE CBC AUTOMATED: CPT | Performed by: BEHAVIOR TECHNICIAN

## 2025-05-23 RX ORDER — CALCIUM CARBONATE 300MG(750)
400 TABLET,CHEWABLE ORAL DAILY
Start: 2025-05-23

## 2025-05-23 RX ORDER — AMOXICILLIN 250 MG
2 CAPSULE ORAL NIGHTLY
Start: 2025-05-23

## 2025-05-23 RX ORDER — WARFARIN 3 MG/1
3 TABLET ORAL EVERY EVENING
Start: 2025-05-23

## 2025-05-23 RX ORDER — POLYETHYLENE GLYCOL 3350 17 G/17G
17 POWDER, FOR SOLUTION ORAL DAILY
Start: 2025-05-23

## 2025-05-23 RX ORDER — EAR PLUGS
1 EACH OTIC (EAR) 2 TIMES DAILY
Start: 2025-05-23

## 2025-05-23 RX ADMIN — ATENOLOL 50 MG: 50 TABLET ORAL at 08:41

## 2025-05-23 RX ADMIN — INSULIN LISPRO 4 UNITS: 100 INJECTION, SOLUTION INTRAVENOUS; SUBCUTANEOUS at 08:41

## 2025-05-23 RX ADMIN — INSULIN LISPRO 4 UNITS: 100 INJECTION, SOLUTION INTRAVENOUS; SUBCUTANEOUS at 11:41

## 2025-05-23 RX ADMIN — Medication 1 APPLICATION: at 08:51

## 2025-05-23 RX ADMIN — POLYETHYLENE GLYCOL 3350 17 G: 17 POWDER, FOR SOLUTION ORAL at 08:41

## 2025-05-23 RX ADMIN — POTASSIUM CHLORIDE 20 MEQ: 1500 TABLET, EXTENDED RELEASE ORAL at 08:41

## 2025-05-23 ASSESSMENT — COGNITIVE AND FUNCTIONAL STATUS - GENERAL
MOVING FROM LYING ON BACK TO SITTING ON SIDE OF FLAT BED WITH BEDRAILS: A LOT
TOILETING: A LOT
STANDING UP FROM CHAIR USING ARMS: A LOT
CLIMB 3 TO 5 STEPS WITH RAILING: A LOT
WALKING IN HOSPITAL ROOM: A LOT
DAILY ACTIVITIY SCORE: 12
EATING MEALS: A LOT
MOBILITY SCORE: 12
DRESSING REGULAR UPPER BODY CLOTHING: A LOT
DRESSING REGULAR LOWER BODY CLOTHING: A LOT
MOVING TO AND FROM BED TO CHAIR: A LOT
TURNING FROM BACK TO SIDE WHILE IN FLAT BAD: A LOT
PERSONAL GROOMING: A LOT
HELP NEEDED FOR BATHING: A LOT

## 2025-05-23 ASSESSMENT — ACTIVITIES OF DAILY LIVING (ADL): LACK_OF_TRANSPORTATION: PATIENT UNABLE TO ANSWER

## 2025-05-23 ASSESSMENT — PAIN SCALES - GENERAL: PAINLEVEL_OUTOF10: 0 - NO PAIN

## 2025-05-23 NOTE — CARE PLAN
The patient's goals for the shift include  remain comfortable    The clinical goals for the shift include pt will have well controlled pain throughout this shift        Problem: Fall/Injury  Goal: Not fall by end of shift  Outcome: Progressing     Problem: Skin  Goal: Decreased wound size/increased tissue granulation at next dressing change  Outcome: Progressing  Flowsheets (Taken 5/23/2025 1013)  Decreased wound size/increased tissue granulation at next dressing change: Protective dressings over bony prominences

## 2025-05-23 NOTE — DISCHARGE INSTRUCTIONS
Please, take your home medications as instructed after being discharged from the hospital.     NEW MEDICATIONS:  Warfarin 3 mg daily  Magnesium oxide 400 mg daily  Miralax 17 g once daily. Please hold if having diarrhea or frequent bowel movements.  Lyudmila-Colace 2 tablets nightly. Please hold if having diarrhea or frequent bowel movements.  Zinc oxide 40% ointment 2x a day to skin fold along right lower back and buttocks.    OTHER INSTRUCTIONS:  Please get labs PT/INR checked weekly  Referral to nutrition placed for follow up at facility.      UPCOMING APPOINTMENTS:       Please, follow-up with your primary care provider within 7 to 14 days after leaving the hospital. /appointment services has been requested to make an appointment for you, however if you do not hear back from them within 1 to 2 days, please call your primary physician's office to schedule an appointment. Bring your photo ID and insurance card to your appointment.   [facility]  services can be reached at [phone].     If you experience any worsening symptoms or have any concerns, please contact your primary care provider to schedule an appointment. If you cannot get in touch with your primary care physician, please return to the nearest emergency room or urgent care clinic for an evaluation and treatment.     Thank you for choosing [facility] and allowing us to partake in your medical care!     - Your [facility] inpatient primary care team.     

## 2025-05-23 NOTE — DISCHARGE SUMMARY
Discharge Diagnosis  Febrile illness, acute  Abdominal pain 2/2 constipation  Hypomagnesemia  IVC/infrarenal thrombus           Issues Requiring Follow-Up  Febrile illness, acute - resolved  Abdominal pain 2/2 constipation - resolved. Rx miralax and waldo-colace  Hypomagnesemia - resolved. Rx magnesium supplements  IVC/infrarenal thrombus - Decrease warfarin to 3 mg. Weekly PT/INR check.     Discharge Meds     Medication List      START taking these medications     magnesium oxide 400 mg tablet; Commonly known as: Mag-Ox; Take 1 tablet   (400 mg) by mouth once daily.   polyethylene glycol 17 gram packet; Commonly known as: Glycolax,   Miralax; Take 17 g by mouth once daily. Please hold if having diarrhea or   frequent bowel movements.   sennosides-docusate sodium 8.6-50 mg tablet; Commonly known as:   Waldo-Colace; Take 2 tablets by mouth once daily at bedtime. Please hold if   having diarrhea or frequent bowel movements.   zinc oxide 40 % ointment ointment; Apply 1 Application topically 2 times   a day. Apply to skin fold along right flank and buttocks.     CHANGE how you take these medications     warfarin 3 mg tablet; Commonly known as: Coumadin; Take 1 tablet (3 mg)   by mouth once daily in the evening. Take as directed per After Visit   Summary.; What changed: medication strength, how much to take     CONTINUE taking these medications     atenolol 50 mg tablet; Commonly known as: Tenormin; Take 1 tablet (50   mg) by mouth once daily.   atorvastatin 40 mg tablet; Commonly known as: Lipitor; Take 1 tablet (40   mg) by mouth once daily.   blood-glucose,,cont misc; Use as instructed   donepezil 10 mg tablet; Commonly known as: Aricept; Take 1 tablet (10   mg) by mouth once daily at bedtime.   FreeStyle Elsie 14 Day Sensor kit; Generic drug: flash glucose sensor   kit; Use as instructed   glipiZIDE 2.5 mg tablet; Take 2.5 mg by mouth once daily.   melatonin 3 mg tablet   mirtazapine 15 mg tablet; Commonly  "known as: Remeron; Take 1 tablet (15   mg) by mouth once daily at bedtime.   OneTouch Ultra Test; Generic drug: blood sugar diagnostic; TEST ONCE   DAILY   potassium chloride CR 20 mEq ER tablet; Commonly known as: Klor-Con M20;   Take 1 tablet (20 mEq) by mouth once daily. Do not crush or chew.     STOP taking these medications     docusate sodium 100 mg capsule; Commonly known as: Colace       Test Results Pending At Discharge  Pending Labs       Order Current Status    Blood Culture Preliminary result    Blood Culture Preliminary result            Hospital Course  Brief HPI:  80 y.o. female with PMHX  vascular dementia, dysphagia, HTN/HLD, T2DM (4/2025 8.4%), and hypomagnesemia who presented to the hospital for abdominal pain and AMS     Per chart review, patient was brought to the ED by her family with concerns of patient having \"shaking\" today. It was reported that patient endorsed abdominal pain when going to the bathroom. Patient reported to be more confused than her baseline.     ED Course:  Patient had a temp of 100.6 F and a RR 22. CT a/p showed IVC/infrarenal thrombus that was seen on previous imaging and presence of significant amount of stool. CXR, CT head, and US DVT B/L were all negative. Labs significant for low magnesium, WBC 15.0, Plt 100, and INR 4.1. UA showed turbid urine with mild +leuk est and negative nitrites. While in the ED, was given Tylenol 650 mg x1 for fever, started on ceftriaxone for presumed UTI, and mag repletion.        Floors:  US iliac IVC showed presence of thrombus, appears to have unchanged. Endovascular not needed for acute intervention. Home warfarin continued to be held due to supratherapeutic INR. No reoccurrence of fever and leukocytosis resolved, d/c abx for low suspicion of infectious etiology. Aggressive BM regimen in place and patient was able to have a BM. Throughout her stay, she clinically improved and returned to her mental baseline per . Palliative was " consulted and family expressed not wanting to pursue hospice at this time. Patient medically stable for discharge to LTC with nutrition referral and reduced warfarin to 3 mg.       Pertinent Physical Exam At Time of Discharge  Physical Exam  Constitutional:       Appearance: Normal appearance.   Cardiovascular:      Rate and Rhythm: Normal rate and regular rhythm.      Pulses: Normal pulses.      Heart sounds: Normal heart sounds.   Pulmonary:      Effort: Pulmonary effort is normal.      Breath sounds: Normal breath sounds.   Abdominal:      Palpations: Abdomen is soft.      Tenderness: There is no abdominal tenderness. There is no guarding or rebound.   Musculoskeletal:      Right lower leg: No edema.      Left lower leg: No edema.   Skin:     General: Skin is warm and dry.   Neurological:      Mental Status: She is alert. Mental status is at baseline.   Psychiatric:         Mood and Affect: Mood normal.         Behavior: Behavior normal.     Outpatient Follow-Up  Future Appointments   Date Time Provider Department Center   5/27/2025 10:00 AM CHARLENE CALHOUN 1 GEAVSC [facility]   6/2/2025 11:30 AM Bhavana Reyna PA-C [id number] [facility]   6/2/2025  2:15 PM Amna Knight PA-C [id number] [facility]   7/30/2025  9:00 AM TRICIA Haskins [id number] [facility]   10/13/2025  1:20 PM TRICIA Haskins [id number] [facility]         Juanita Tenorio DO

## 2025-05-23 NOTE — CARE PLAN
The clinical goals for the shift include pt will have well controlled pain throughout this shift    Problem: Skin  Goal: Promote skin healing  Outcome: Progressing  Flowsheets (Taken 5/22/2025 2258)  Promote skin healing:   Turn/reposition every 2 hours/use positioning/transfer devices   Protective dressings over bony prominences     Problem: Pain - Adult  Goal: Verbalizes/displays adequate comfort level or baseline comfort level  Outcome: Progressing     Problem: Nutrition  Goal: Nutrient intake appropriate for maintaining nutritional needs  Outcome: Progressing     Problem: Fall/Injury  Goal: Be free from injury by end of the shift  Outcome: Progressing

## 2025-05-23 NOTE — PROGRESS NOTES
05/23/25 0715   Discharge Planning   Living Arrangements Spouse/significant other   Support Systems Spouse/significant other;Children   Assistance Needed A&OX0-1; dependent on assist for ADLs from spouse but spouse struggling with care; doesn't drive; room air baseline and currently room air;PCP Maryann Almazan CNP; on Coumadin prior to hospitalization.   Type of Residence Private residence   Number of Stairs to Enter Residence 3   Number of Stairs Within Residence 0   Do you have animals or pets at home? No   Home or Post Acute Services In home services   Expected Discharge Disposition Inter  (Palliative mtg today. Plan is dc to [facility] with hospice services. Family to private pay for ICF.)   Does the patient need discharge transport arranged? Yes   RoundTrip coordination needed? Yes   Has discharge transport been arranged? No   Financial Resource Strain   How hard is it for you to pay for the very basics like food, housing, medical care, and heating? Pt Unable   Housing Stability   In the last 12 months, was there a time when you were not able to pay the mortgage or rent on time? Pt Unable   In the past 12 months, how many times have you moved where you were living? 0   At any time in the past 12 months, were you homeless or living in a shelter (including now)? Pt Unable   Transportation Needs   In the past 12 months, has lack of transportation kept you from medical appointments or from getting medications? Pt Unable   In the past 12 months, has lack of transportation kept you from meetings, work, or from getting things needed for daily living? Pt Unable        05/23/25 1200   Discharge Planning   Expected Discharge Disposition Inter  (Pt cleared for dc to [facility]. Family has secured payment for private pay. AVS and GOLDFORM sent to facility. Transport 2pm with [facility] confirmed. Floor and family aware of dc)

## 2025-05-23 NOTE — CONSULTS
Consults    PALLIATIVE CARE SOCIAL WORK CONSULT:  Juanita CONLEY  CURRENT ATTENDING PROVIDER: Becki Castro MD     Reason for Consult    Recurrent hospitalization, vascular dementia    Introduction to Palliative Care  Met with pt's spouse outside of pt's room, daniel Marinelli on phone.   Pt unable to participate in visit.  Staff present:  Juanita Thomas  Palliative care was introduced as a service for patients with serious illness to help with symptoms, assist with goals of care conversations, navigate complex decision making, improve quality of life for patients, and provide support to patients and families.  Support and empathy was provided throughout the encounter.    History of Present Illness  Inge Clark is a 80 y.o. female with past medical history of vascular dementia, dysphagia, HTN/HLD, T2DM (4/2025 8.4%), and hypomagnesemia who presented to the hospital for abdominal pain and AMS.      Caregiving/Caregiver Support  Does the patient require assistance in some or all components of her care, including coordination of medical care?  yes  If Yes, which person serves that role? Placement needed    Medical History  Per EMR.     Personal History  Pt has been  58 years.  Pt has two children, both somewhat local and involved.     Depression   not assessed     Anxiety  not assessed       Advance Directives  Surrogate Health Care Decision Maker:  spouse, children  HCPOA  no  Living Will  no    Code Status                    DNR DNI.  Team asked to sign [place] DNRCCA to dc with pt.     Serious Illness Conversation        Life Limiting Disease:  dementia  Disease Specific Information Provided:  continual decline, role of hospice  What is your understanding now of where you are with your illness:   family feels pt is 'too well' for hospice at this time and not interested in another meeting.   What are your fears, worries, or concerns about the future:  pt's increased care needs  What are you hoping for:  pt  to walk again  What brings you hitesh:   family  How much does your family know about your priorities and wishes:  unknown     Other distressing Symptoms        Frequent hospitalizations, decline.     Plan and Social Considerations  Family is working on pt dc to [place] [facility] private pay for two weeks.  Family has begun looking for permanent placement and plan is for pt transfer from [facility] [facility] to a memory care facility.      Plan of Care discussed with: team    Thank you for asking Palliative Care to assist with care of this patient.  We will sign off at this time.   Please contact us for additional questions or concerns.    YAEL Worthington  Palliative Care   Contact Via Epic Secure chat

## 2025-05-25 LAB
BACTERIA BLD CULT: NORMAL
BACTERIA BLD CULT: NORMAL

## 2025-05-27 ENCOUNTER — APPOINTMENT (OUTPATIENT)
Dept: VASCULAR MEDICINE | Facility: HOSPITAL | Age: 80
End: 2025-05-27
Payer: MEDICARE

## 2025-05-28 ENCOUNTER — ANTICOAGULATION - WARFARIN VISIT (OUTPATIENT)
Dept: PHARMACY | Facility: HOSPITAL | Age: 80
End: 2025-05-28
Payer: MEDICARE

## 2025-05-28 DIAGNOSIS — I82.220: ICD-10-CM

## 2025-05-28 DIAGNOSIS — I82.90 THROMBUS: Primary | ICD-10-CM

## 2025-05-28 NOTE — PROGRESS NOTES
Patient referred by inpatient medical team at [facility]. Unfortunately, PCP is not on the approved provider list and patient is not able to be seen by the [facility] pharmacy clinic. PCP notified via staff message. Will cancel pharmacy referral at this time.     Candie Gasca, PharmD  Clinical Pharmacist  [phone]

## 2025-05-30 ENCOUNTER — TELEPHONE (OUTPATIENT)
Dept: PRIMARY CARE | Facility: CLINIC | Age: 80
End: 2025-05-30
Payer: MEDICARE

## 2025-05-30 ENCOUNTER — APPOINTMENT (OUTPATIENT)
Dept: VASCULAR MEDICINE | Facility: HOSPITAL | Age: 80
End: 2025-05-30
Payer: MEDICARE

## 2025-05-30 DIAGNOSIS — E78.2 DM TYPE 2 WITH DIABETIC MIXED HYPERLIPIDEMIA (MULTI): ICD-10-CM

## 2025-05-30 DIAGNOSIS — I82.220: ICD-10-CM

## 2025-05-30 DIAGNOSIS — E11.69 DM TYPE 2 WITH DIABETIC MIXED HYPERLIPIDEMIA (MULTI): ICD-10-CM

## 2025-05-30 RX ORDER — GLIPIZIDE 2.5 MG/1
2.5 TABLET ORAL DAILY
Qty: 90 TABLET | Refills: 3 | Status: SHIPPED | OUTPATIENT
Start: 2025-05-30 | End: 2026-05-30

## 2025-05-30 NOTE — TELEPHONE ENCOUNTER
Med Refill   glipiZIDE 2.5 mg tablet [[id number]]     [id number] - [place], [place] - [address]  [address], [facility] [address]  Phone: [phone]  Fax: [phone]  KARMEN #: [id number]

## 2025-06-01 LAB
ATRIAL RATE: 93 BPM
P AXIS: 62 DEGREES
P OFFSET: 179 MS
P ONSET: 143 MS
PR INTERVAL: 150 MS
Q ONSET: 218 MS
QRS COUNT: 16 BEATS
QRS DURATION: 78 MS
QT INTERVAL: 344 MS
QTC CALCULATION(BAZETT): 427 MS
QTC FREDERICIA: 398 MS
R AXIS: 104 DEGREES
T AXIS: 59 DEGREES
T OFFSET: 390 MS
VENTRICULAR RATE: 93 BPM

## 2025-06-02 ENCOUNTER — APPOINTMENT (OUTPATIENT)
Facility: CLINIC | Age: 80
End: 2025-06-02
Payer: MEDICARE

## 2025-06-02 ENCOUNTER — APPOINTMENT (OUTPATIENT)
Dept: CARDIOLOGY | Facility: HOSPITAL | Age: 80
End: 2025-06-02
Payer: MEDICARE

## 2025-06-06 DIAGNOSIS — I82.220: ICD-10-CM

## 2025-06-13 DIAGNOSIS — I82.220: ICD-10-CM

## 2025-06-20 DIAGNOSIS — I82.220: ICD-10-CM

## 2025-06-27 DIAGNOSIS — I82.220: ICD-10-CM

## 2025-07-02 ENCOUNTER — APPOINTMENT (OUTPATIENT)
Dept: CARDIOLOGY | Facility: HOSPITAL | Age: 80
End: 2025-07-02
Payer: MEDICARE

## 2025-07-04 DIAGNOSIS — I82.220: ICD-10-CM

## 2025-07-11 DIAGNOSIS — I82.220: ICD-10-CM

## 2025-07-14 ENCOUNTER — LAB REQUISITION (OUTPATIENT)
Dept: LAB | Facility: HOSPITAL | Age: 80
End: 2025-07-14
Payer: MEDICARE

## 2025-07-14 DIAGNOSIS — Z79.01 LONG TERM (CURRENT) USE OF ANTICOAGULANTS: ICD-10-CM

## 2025-07-14 LAB
INR PPP: 2.1 (ref 0.9–1.1)
PROTHROMBIN TIME: 23.8 SECONDS (ref 9.8–12.4)

## 2025-07-14 PROCEDURE — 36415 COLL VENOUS BLD VENIPUNCTURE: CPT | Mod: OUT | Performed by: NURSE PRACTITIONER

## 2025-07-14 PROCEDURE — 85610 PROTHROMBIN TIME: CPT | Mod: OUT | Performed by: NURSE PRACTITIONER

## 2025-07-18 DIAGNOSIS — I82.220: ICD-10-CM

## 2025-07-21 ENCOUNTER — LAB REQUISITION (OUTPATIENT)
Dept: LAB | Facility: HOSPITAL | Age: 80
End: 2025-07-21
Payer: MEDICARE

## 2025-07-21 DIAGNOSIS — E11.65 TYPE 2 DIABETES MELLITUS WITH HYPERGLYCEMIA (MULTI): ICD-10-CM

## 2025-07-21 LAB
ALBUMIN SERPL BCP-MCNC: 3 G/DL (ref 3.4–5)
ALP SERPL-CCNC: 94 U/L (ref 33–136)
ALT SERPL W P-5'-P-CCNC: 47 U/L (ref 7–45)
ANION GAP SERPL CALC-SCNC: 18 MMOL/L (ref 10–20)
AST SERPL W P-5'-P-CCNC: 45 U/L (ref 9–39)
BASOPHILS # BLD AUTO: 0.02 X10*3/UL (ref 0–0.1)
BASOPHILS NFR BLD AUTO: 0.3 %
BILIRUB SERPL-MCNC: 0.7 MG/DL (ref 0–1.2)
BUN SERPL-MCNC: 11 MG/DL (ref 6–23)
CALCIUM SERPL-MCNC: 8 MG/DL (ref 8.6–10.3)
CHLORIDE SERPL-SCNC: 100 MMOL/L (ref 98–107)
CO2 SERPL-SCNC: 19 MMOL/L (ref 21–32)
CREAT SERPL-MCNC: 0.66 MG/DL (ref 0.5–1.05)
EGFRCR SERPLBLD CKD-EPI 2021: 90 ML/MIN/1.73M*2
EOSINOPHIL # BLD AUTO: 0.11 X10*3/UL (ref 0–0.4)
EOSINOPHIL NFR BLD AUTO: 1.5 %
ERYTHROCYTE [DISTWIDTH] IN BLOOD BY AUTOMATED COUNT: 17.8 % (ref 11.5–14.5)
GLUCOSE SERPL-MCNC: 214 MG/DL (ref 74–99)
HCT VFR BLD AUTO: 34.1 % (ref 36–46)
HGB BLD-MCNC: 9.8 G/DL (ref 12–16)
IMM GRANULOCYTES # BLD AUTO: 0.03 X10*3/UL (ref 0–0.5)
IMM GRANULOCYTES NFR BLD AUTO: 0.4 % (ref 0–0.9)
LYMPHOCYTES # BLD AUTO: 1.32 X10*3/UL (ref 0.8–3)
LYMPHOCYTES NFR BLD AUTO: 18.6 %
MCH RBC QN AUTO: 26.8 PG (ref 26–34)
MCHC RBC AUTO-ENTMCNC: 28.7 G/DL (ref 32–36)
MCV RBC AUTO: 93 FL (ref 80–100)
MONOCYTES # BLD AUTO: 0.37 X10*3/UL (ref 0.05–0.8)
MONOCYTES NFR BLD AUTO: 5.2 %
NEUTROPHILS # BLD AUTO: 5.26 X10*3/UL (ref 1.6–5.5)
NEUTROPHILS NFR BLD AUTO: 74 %
NRBC BLD-RTO: 0 /100 WBCS (ref 0–0)
PLATELET # BLD AUTO: 56 X10*3/UL (ref 150–450)
POTASSIUM SERPL-SCNC: 4.7 MMOL/L (ref 3.5–5.3)
PROT SERPL-MCNC: 6.1 G/DL (ref 6.4–8.2)
RBC # BLD AUTO: 3.66 X10*6/UL (ref 4–5.2)
RBC MORPH BLD: NORMAL
SODIUM SERPL-SCNC: 132 MMOL/L (ref 136–145)
WBC # BLD AUTO: 7.1 X10*3/UL (ref 4.4–11.3)

## 2025-07-21 PROCEDURE — 36415 COLL VENOUS BLD VENIPUNCTURE: CPT | Mod: OUT | Performed by: NURSE PRACTITIONER

## 2025-07-21 PROCEDURE — 84075 ASSAY ALKALINE PHOSPHATASE: CPT | Mod: OUT | Performed by: NURSE PRACTITIONER

## 2025-07-21 PROCEDURE — 85025 COMPLETE CBC W/AUTO DIFF WBC: CPT | Mod: OUT | Performed by: NURSE PRACTITIONER

## 2025-07-22 ENCOUNTER — LAB REQUISITION (OUTPATIENT)
Dept: LAB | Facility: HOSPITAL | Age: 80
End: 2025-07-22
Payer: MEDICARE

## 2025-07-22 DIAGNOSIS — Z79.01 LONG TERM (CURRENT) USE OF ANTICOAGULANTS: ICD-10-CM

## 2025-07-22 LAB
INR PPP: 1.5 (ref 0.9–1.1)
PROTHROMBIN TIME: 16.8 SECONDS (ref 9.8–12.4)

## 2025-07-22 PROCEDURE — 85610 PROTHROMBIN TIME: CPT | Mod: OUT | Performed by: NURSE PRACTITIONER

## 2025-07-22 PROCEDURE — 36415 COLL VENOUS BLD VENIPUNCTURE: CPT | Mod: OUT | Performed by: NURSE PRACTITIONER

## 2025-07-25 DIAGNOSIS — I82.220: ICD-10-CM

## 2025-07-28 ENCOUNTER — LAB REQUISITION (OUTPATIENT)
Dept: LAB | Facility: HOSPITAL | Age: 80
End: 2025-07-28
Payer: MEDICARE

## 2025-07-28 DIAGNOSIS — I48.0 PAROXYSMAL ATRIAL FIBRILLATION (MULTI): ICD-10-CM

## 2025-07-28 DIAGNOSIS — Z79.01 LONG TERM (CURRENT) USE OF ANTICOAGULANTS: ICD-10-CM

## 2025-07-28 LAB
INR PPP: 1.5 (ref 0.9–1.1)
PROTHROMBIN TIME: 16.7 SECONDS (ref 9.8–12.4)

## 2025-07-28 PROCEDURE — 85610 PROTHROMBIN TIME: CPT | Mod: OUT | Performed by: NURSE PRACTITIONER

## 2025-07-28 PROCEDURE — 36415 COLL VENOUS BLD VENIPUNCTURE: CPT | Mod: OUT | Performed by: NURSE PRACTITIONER

## 2025-07-29 ENCOUNTER — TELEPHONE (OUTPATIENT)
Dept: PRIMARY CARE | Facility: CLINIC | Age: 80
End: 2025-07-29
Payer: MEDICARE

## 2025-07-29 NOTE — TELEPHONE ENCOUNTER
Called patient to remind her of her appt  and her  answered the phone and said he had to put her in a home. He was no longer able to take care of her with all of her falls. I did cancel all of her appts with us.

## 2025-07-30 ENCOUNTER — APPOINTMENT (OUTPATIENT)
Dept: PRIMARY CARE | Facility: CLINIC | Age: 80
End: 2025-07-30
Payer: MEDICARE

## 2025-08-01 DIAGNOSIS — I82.220: ICD-10-CM

## 2025-08-04 ENCOUNTER — LAB REQUISITION (OUTPATIENT)
Dept: LAB | Facility: HOSPITAL | Age: 80
End: 2025-08-04
Payer: MEDICARE

## 2025-08-04 DIAGNOSIS — Z79.01 LONG TERM (CURRENT) USE OF ANTICOAGULANTS: ICD-10-CM

## 2025-08-04 DIAGNOSIS — I48.0 PAROXYSMAL ATRIAL FIBRILLATION (MULTI): ICD-10-CM

## 2025-08-04 LAB
INR PPP: 1.3 (ref 0.9–1.1)
PROTHROMBIN TIME: 14.4 SECONDS (ref 9.8–12.4)

## 2025-08-04 PROCEDURE — 36415 COLL VENOUS BLD VENIPUNCTURE: CPT | Mod: OUT | Performed by: NURSE PRACTITIONER

## 2025-08-04 PROCEDURE — 85610 PROTHROMBIN TIME: CPT | Mod: OUT | Performed by: NURSE PRACTITIONER

## 2025-08-08 DIAGNOSIS — I82.220: ICD-10-CM

## 2025-08-11 ENCOUNTER — LAB REQUISITION (OUTPATIENT)
Dept: LAB | Facility: HOSPITAL | Age: 80
End: 2025-08-11
Payer: MEDICARE

## 2025-08-11 DIAGNOSIS — Z79.01 LONG TERM (CURRENT) USE OF ANTICOAGULANTS: ICD-10-CM

## 2025-08-11 DIAGNOSIS — I48.0 PAROXYSMAL ATRIAL FIBRILLATION (MULTI): ICD-10-CM

## 2025-08-11 LAB
ALBUMIN SERPL BCP-MCNC: 3.5 G/DL (ref 3.4–5)
ALP SERPL-CCNC: 111 U/L (ref 33–136)
ALT SERPL W P-5'-P-CCNC: 54 U/L (ref 7–45)
ANION GAP SERPL CALC-SCNC: 16 MMOL/L (ref 10–20)
AST SERPL W P-5'-P-CCNC: 45 U/L (ref 9–39)
BASOPHILS # BLD AUTO: 0.02 X10*3/UL (ref 0–0.1)
BASOPHILS NFR BLD AUTO: 0.3 %
BILIRUB SERPL-MCNC: 0.5 MG/DL (ref 0–1.2)
BUN SERPL-MCNC: 16 MG/DL (ref 6–23)
CALCIUM SERPL-MCNC: 8.7 MG/DL (ref 8.6–10.3)
CHLORIDE SERPL-SCNC: 101 MMOL/L (ref 98–107)
CO2 SERPL-SCNC: 26 MMOL/L (ref 21–32)
CREAT SERPL-MCNC: 0.62 MG/DL (ref 0.5–1.05)
EGFRCR SERPLBLD CKD-EPI 2021: >90 ML/MIN/1.73M*2
EOSINOPHIL # BLD AUTO: 0.09 X10*3/UL (ref 0–0.4)
EOSINOPHIL NFR BLD AUTO: 1.1 %
ERYTHROCYTE [DISTWIDTH] IN BLOOD BY AUTOMATED COUNT: 18 % (ref 11.5–14.5)
GLUCOSE SERPL-MCNC: 191 MG/DL (ref 74–99)
HCT VFR BLD AUTO: 34.3 % (ref 36–46)
HGB BLD-MCNC: 10.7 G/DL (ref 12–16)
IMM GRANULOCYTES # BLD AUTO: 0.03 X10*3/UL (ref 0–0.5)
IMM GRANULOCYTES NFR BLD AUTO: 0.4 % (ref 0–0.9)
INR PPP: 1.7 (ref 0.9–1.1)
LYMPHOCYTES # BLD AUTO: 1.26 X10*3/UL (ref 0.8–3)
LYMPHOCYTES NFR BLD AUTO: 15.9 %
MAGNESIUM SERPL-MCNC: 1.94 MG/DL (ref 1.6–2.4)
MCH RBC QN AUTO: 27 PG (ref 26–34)
MCHC RBC AUTO-ENTMCNC: 31.2 G/DL (ref 32–36)
MCV RBC AUTO: 86 FL (ref 80–100)
MONOCYTES # BLD AUTO: 0.32 X10*3/UL (ref 0.05–0.8)
MONOCYTES NFR BLD AUTO: 4 %
NEUTROPHILS # BLD AUTO: 6.22 X10*3/UL (ref 1.6–5.5)
NEUTROPHILS NFR BLD AUTO: 78.3 %
NRBC BLD-RTO: 0 /100 WBCS (ref 0–0)
PLATELET # BLD AUTO: 79 X10*3/UL (ref 150–450)
POTASSIUM SERPL-SCNC: 4.8 MMOL/L (ref 3.5–5.3)
PROT SERPL-MCNC: 7 G/DL (ref 6.4–8.2)
PROTHROMBIN TIME: 18.5 SECONDS (ref 9.8–12.4)
RBC # BLD AUTO: 3.97 X10*6/UL (ref 4–5.2)
SODIUM SERPL-SCNC: 138 MMOL/L (ref 136–145)
TSH SERPL-ACNC: 0.79 MIU/L (ref 0.44–3.98)
WBC # BLD AUTO: 7.9 X10*3/UL (ref 4.4–11.3)

## 2025-08-11 PROCEDURE — 83735 ASSAY OF MAGNESIUM: CPT | Mod: OUT | Performed by: NURSE PRACTITIONER

## 2025-08-11 PROCEDURE — 36415 COLL VENOUS BLD VENIPUNCTURE: CPT | Mod: OUT | Performed by: NURSE PRACTITIONER

## 2025-08-11 PROCEDURE — 85025 COMPLETE CBC W/AUTO DIFF WBC: CPT | Mod: OUT | Performed by: NURSE PRACTITIONER

## 2025-08-11 PROCEDURE — 84075 ASSAY ALKALINE PHOSPHATASE: CPT | Mod: OUT | Performed by: NURSE PRACTITIONER

## 2025-08-11 PROCEDURE — 84443 ASSAY THYROID STIM HORMONE: CPT | Mod: OUT | Performed by: NURSE PRACTITIONER

## 2025-08-11 PROCEDURE — 85610 PROTHROMBIN TIME: CPT | Mod: OUT | Performed by: NURSE PRACTITIONER

## 2025-08-15 DIAGNOSIS — I82.220: ICD-10-CM

## 2025-08-18 ENCOUNTER — LAB REQUISITION (OUTPATIENT)
Dept: LAB | Facility: HOSPITAL | Age: 80
End: 2025-08-18
Payer: MEDICARE

## 2025-08-18 DIAGNOSIS — Z79.01 LONG TERM (CURRENT) USE OF ANTICOAGULANTS: ICD-10-CM

## 2025-08-18 DIAGNOSIS — I48.0 PAROXYSMAL ATRIAL FIBRILLATION (MULTI): ICD-10-CM

## 2025-08-18 LAB
INR PPP: 1.8 (ref 0.9–1.1)
PROTHROMBIN TIME: 20 SECONDS (ref 9.8–12.4)

## 2025-08-18 PROCEDURE — 85610 PROTHROMBIN TIME: CPT | Mod: OUT | Performed by: NURSE PRACTITIONER

## 2025-08-18 PROCEDURE — 36415 COLL VENOUS BLD VENIPUNCTURE: CPT | Mod: OUT | Performed by: NURSE PRACTITIONER

## 2025-08-22 DIAGNOSIS — I82.220: ICD-10-CM

## 2025-08-25 ENCOUNTER — LAB REQUISITION (OUTPATIENT)
Dept: LAB | Facility: HOSPITAL | Age: 80
End: 2025-08-25
Payer: MEDICARE

## 2025-08-25 DIAGNOSIS — I48.91 UNSPECIFIED ATRIAL FIBRILLATION (MULTI): ICD-10-CM

## 2025-08-25 DIAGNOSIS — Z79.01 LONG TERM (CURRENT) USE OF ANTICOAGULANTS: ICD-10-CM

## 2025-08-25 LAB
INR PPP: 1.7 (ref 0.9–1.1)
PROTHROMBIN TIME: 18.5 SECONDS (ref 9.8–12.4)

## 2025-08-25 PROCEDURE — 85610 PROTHROMBIN TIME: CPT | Mod: OUT | Performed by: NURSE PRACTITIONER

## 2025-08-25 PROCEDURE — 36415 COLL VENOUS BLD VENIPUNCTURE: CPT | Mod: OUT | Performed by: NURSE PRACTITIONER

## 2025-08-29 DIAGNOSIS — I82.220: ICD-10-CM

## 2025-09-04 ENCOUNTER — LAB REQUISITION (OUTPATIENT)
Dept: LAB | Facility: HOSPITAL | Age: 80
End: 2025-09-04
Payer: MEDICARE

## 2025-09-04 DIAGNOSIS — I48.91 UNSPECIFIED ATRIAL FIBRILLATION (MULTI): ICD-10-CM

## 2025-09-04 LAB
INR PPP: 1.9 (ref 0.9–1.1)
PROTHROMBIN TIME: 21 SECONDS (ref 9.8–12.4)

## 2025-09-04 PROCEDURE — 36415 COLL VENOUS BLD VENIPUNCTURE: CPT | Mod: OUT | Performed by: NURSE PRACTITIONER

## 2025-09-04 PROCEDURE — 85610 PROTHROMBIN TIME: CPT | Mod: OUT | Performed by: NURSE PRACTITIONER

## 2025-10-13 ENCOUNTER — APPOINTMENT (OUTPATIENT)
Dept: PRIMARY CARE | Facility: CLINIC | Age: 80
End: 2025-10-13
Payer: MEDICARE